# Patient Record
Sex: MALE | Race: WHITE | Employment: PART TIME | URBAN - NONMETROPOLITAN AREA
[De-identification: names, ages, dates, MRNs, and addresses within clinical notes are randomized per-mention and may not be internally consistent; named-entity substitution may affect disease eponyms.]

---

## 2017-05-26 ENCOUNTER — HOSPITAL ENCOUNTER (EMERGENCY)
Age: 52
Discharge: HOME | End: 2017-05-26
Attending: EMERGENCY MEDICINE
Payer: MEDICARE

## 2017-05-26 VITALS
RESPIRATION RATE: 16 BRPM | WEIGHT: 190 LBS | DIASTOLIC BLOOD PRESSURE: 87 MMHG | SYSTOLIC BLOOD PRESSURE: 137 MMHG | BODY MASS INDEX: 25.73 KG/M2 | HEART RATE: 78 BPM | OXYGEN SATURATION: 94 % | HEIGHT: 72 IN | TEMPERATURE: 98.7 F

## 2017-05-26 DIAGNOSIS — K04.7 DENTAL ABSCESS: Primary | ICD-10-CM

## 2017-05-26 DIAGNOSIS — Z79.4 TYPE 2 DIABETES MELLITUS WITH HYPERGLYCEMIA, WITH LONG-TERM CURRENT USE OF INSULIN (HCC): ICD-10-CM

## 2017-05-26 DIAGNOSIS — E11.65 TYPE 2 DIABETES MELLITUS WITH HYPERGLYCEMIA, WITH LONG-TERM CURRENT USE OF INSULIN (HCC): ICD-10-CM

## 2017-05-26 DIAGNOSIS — R73.9 HYPERGLYCEMIA: ICD-10-CM

## 2017-05-26 LAB
GLUCOSE BLD-MCNC: 354 MG/DL
GLUCOSE BLD-MCNC: 354 MG/DL (ref 74–100)

## 2017-05-26 PROCEDURE — 96372 THER/PROPH/DIAG INJ SC/IM: CPT

## 2017-05-26 PROCEDURE — 82947 ASSAY GLUCOSE BLOOD QUANT: CPT

## 2017-05-26 PROCEDURE — 99283 EMERGENCY DEPT VISIT LOW MDM: CPT

## 2017-05-26 PROCEDURE — 6370000000 HC RX 637 (ALT 250 FOR IP): Performed by: EMERGENCY MEDICINE

## 2017-05-26 RX ORDER — HYDROCODONE BITARTRATE AND ACETAMINOPHEN 5; 325 MG/1; MG/1
1 TABLET ORAL ONCE
Status: COMPLETED | OUTPATIENT
Start: 2017-05-26 | End: 2017-05-26

## 2017-05-26 RX ORDER — PENICILLIN V POTASSIUM 250 MG/1
500 TABLET ORAL ONCE
Status: COMPLETED | OUTPATIENT
Start: 2017-05-26 | End: 2017-05-26

## 2017-05-26 RX ORDER — PENICILLIN V POTASSIUM 500 MG/1
500 TABLET ORAL 4 TIMES DAILY
Qty: 40 TABLET | Refills: 0 | Status: SHIPPED | OUTPATIENT
Start: 2017-05-26 | End: 2017-06-02

## 2017-05-26 RX ADMIN — PENICILLIN V POTASIUM 500 MG: 250 TABLET ORAL at 20:35

## 2017-05-26 RX ADMIN — INSULIN LISPRO 10 UNITS: 100 INJECTION, SOLUTION INTRAVENOUS; SUBCUTANEOUS at 21:25

## 2017-05-26 RX ADMIN — HYDROCODONE BITARTRATE AND ACETAMINOPHEN 1 TABLET: 5; 325 TABLET ORAL at 20:35

## 2017-05-26 ASSESSMENT — PAIN SCALES - GENERAL
PAINLEVEL_OUTOF10: 10
PAINLEVEL_OUTOF10: 10

## 2017-05-26 ASSESSMENT — PAIN DESCRIPTION - DESCRIPTORS: DESCRIPTORS: ACHING

## 2017-05-26 ASSESSMENT — PAIN DESCRIPTION - PAIN TYPE: TYPE: ACUTE PAIN

## 2017-05-26 ASSESSMENT — PAIN DESCRIPTION - ORIENTATION: ORIENTATION: LEFT;LOWER

## 2017-05-26 ASSESSMENT — PAIN DESCRIPTION - FREQUENCY: FREQUENCY: CONTINUOUS

## 2017-05-26 ASSESSMENT — PAIN DESCRIPTION - LOCATION: LOCATION: TEETH

## 2017-06-02 ENCOUNTER — HOSPITAL ENCOUNTER (OUTPATIENT)
Age: 52
Discharge: HOME | End: 2017-06-02
Payer: MEDICARE

## 2017-06-02 DIAGNOSIS — Z79.4 TYPE 2 DIABETES MELLITUS WITHOUT COMPLICATION, WITH LONG-TERM CURRENT USE OF INSULIN (HCC): ICD-10-CM

## 2017-06-02 DIAGNOSIS — E11.9 TYPE 2 DIABETES MELLITUS WITHOUT COMPLICATION, WITH LONG-TERM CURRENT USE OF INSULIN (HCC): ICD-10-CM

## 2017-06-02 LAB
ESTIMATED AVERAGE GLUCOSE: 183 MG/DL
HBA1C MFR BLD: 8 % (ref 4.8–5.9)

## 2017-06-02 PROCEDURE — 36415 COLL VENOUS BLD VENIPUNCTURE: CPT

## 2017-06-02 PROCEDURE — 83036 HEMOGLOBIN GLYCOSYLATED A1C: CPT

## 2017-09-25 ENCOUNTER — HOSPITAL ENCOUNTER (OUTPATIENT)
Age: 52
Discharge: HOME | End: 2017-09-25
Payer: MEDICARE

## 2017-09-25 DIAGNOSIS — E11.9 TYPE 2 DIABETES MELLITUS WITHOUT COMPLICATION, WITH LONG-TERM CURRENT USE OF INSULIN (HCC): ICD-10-CM

## 2017-09-25 DIAGNOSIS — Z13.9 SCREENING FOR CONDITION: ICD-10-CM

## 2017-09-25 DIAGNOSIS — Z79.4 TYPE 2 DIABETES MELLITUS WITHOUT COMPLICATION, WITH LONG-TERM CURRENT USE OF INSULIN (HCC): ICD-10-CM

## 2017-09-25 LAB
ESTIMATED AVERAGE GLUCOSE: 174 MG/DL
HBA1C MFR BLD: 7.7 % (ref 4.8–5.9)

## 2017-09-25 PROCEDURE — 86803 HEPATITIS C AB TEST: CPT

## 2017-09-25 PROCEDURE — 83036 HEMOGLOBIN GLYCOSYLATED A1C: CPT

## 2017-09-25 PROCEDURE — 36415 COLL VENOUS BLD VENIPUNCTURE: CPT

## 2017-09-25 PROCEDURE — 87389 HIV-1 AG W/HIV-1&-2 AB AG IA: CPT

## 2017-09-26 ENCOUNTER — CARE COORDINATION (OUTPATIENT)
Dept: CARE COORDINATION | Age: 52
End: 2017-09-26

## 2017-09-26 LAB
HEPATITIS C ANTIBODY: NONREACTIVE
HIV AG/AB: NONREACTIVE

## 2017-10-05 ENCOUNTER — CARE COORDINATION (OUTPATIENT)
Dept: CARE COORDINATION | Age: 52
End: 2017-10-05

## 2017-10-05 NOTE — CARE COORDINATION
Attempted to contact patient today. Left voice message requesting patient call this CC Nurse at [phone].
I agree with care coordinator's plan of care.
per day. Discussed patient's smoking habits and encouraged patient to make a new routine. Advised patien of 800-quit now as resource. Patient verbalizes understanding. Patient reports good support from his parents. Discussed fall prevention/safety. Patient states he fell down the hill when he was climbing over so that he could go down to the bank of the river and fish. Patient reports his abrasions are healing without signs of infection. CC plan: Will follow up in 1-2 weeks for smoking cessation, referral to diabetic ed and blood sugars. Diabetes Assessment    Meal Planning:  None   How often do you test your blood sugar?:  Daily   Do you have barriers with adherence to non-pharmacologic self-management interventions? (Nutrition/Exercise/Self-Monitoring):  No   Have you ever had to go to the ED for symptoms of low blood sugar?:  No      No patient-reported symptoms   Do you have hyperglycemia symptoms?:  No   Do you have hypoglycemia symptoms?:  No   Last Blood Sugar Value:  138   Blood Sugar Monitoring Regimen:  Once a Day   Blood Sugar Trends:  No Change               Care Coordination Interventions    Program Enrollment:  Rising Risk   Referral from Primary Care Provider:  No   Suggested Interventions and Community Resources             Goals Addressed        Patient Stated     Smoking cessation (pt-stated)                  \"I want to quit smoking\"    Barriers: impairment:  cognitive and lack of support  Plan for overcoming my barriers: Patient will continue to wear patch, patient will participate in care coordination  Confidence: 5/10  Anticipated Goal Completion Date: 01/31/2018         Other     Self Monitoring                  Self-Monitored Blood Glucose - I will check my blood sugar Fasting blood sugar    Patient Reported Blood Glucose No flowsheet data found.     Barriers: impairment:  cognitive and lack of education  Plan for overcoming my barriers: Patient will attend

## 2017-10-23 ENCOUNTER — CARE COORDINATION (OUTPATIENT)
Dept: CARE COORDINATION | Age: 52
End: 2017-10-23

## 2017-10-23 NOTE — CARE COORDINATION
Ambulatory Care Coordination Note  11/3/2017  CM Risk Score: 5  Aaliyah Mortality Risk Score:      ACC: Dev Yang RN    Summary Note: Phone call to patient. Patient reports his blood sugars have been higher over the past 2 weeks, usually in the 200's. This nurse discussed reasons why the blood sugars might be elevated and patient states \"I guess I have been eating potato chips and stuff. \"  This nurse asked patient who helps to prepare his meals and he states \"Tyra\" and some other people(paid staff). This nurse asked patient if Duc Khan could accompany him to the diabetic educator so that he could learn more about diabetes and how to take care of himself. Patient states this nurse should call Duc Khan to see when she is available: [phone]. Discussed smoking cessation. Patient states he knows that he should quit smoking, but states \"I just can't do it right now. \"  I will try, but right now I really need them when I get stressed. This nurse provided encouragement and support to patient to quit and described health benefits of quitting. Patient verbalizes understanding. Patient states he went out for a walk today, stating he tries to walk each day because he does not have a car. Provided positve feedback to patient for exercising and discussed the benefits of daily exercise. Left message for Duc Khan at [phone], requesting she contact this nurse. Diabetes Assessment    Meal Planning:  None   How often do you test your blood sugar?:  Daily   Do you have barriers with adherence to non-pharmacologic self-management interventions?  (Nutrition/Exercise/Self-Monitoring):  No   Have you ever had to go to the ED for symptoms of low blood sugar?:  No       Increase or Decrease trend in Blood Sugars   Do you have hyperglycemia symptoms?:  No   Do you have hypoglycemia symptoms?:  No   Last Blood Sugar Value:  145   Blood Sugar Monitoring Regimen:  Once a Day   Blood Sugar Trends:  Steady Increase June Ramirez MD   LEVEMIR FLEXTOUCH 100 UNIT/ML injection pen INJECT 10 UNITS ONCE DAILY 5/31/17   June Ramirez MD   BD PEN NEEDLE ERVIN U/F 32G X 4 MM MISC USE 1 DAILY 4/4/17   June Ramirez MD   NICOTINE STEP 1 21 MG/24HR PLACE 1 PATCH ONTO THE SKIN EVERY 24 HOURS 3/22/17   June Ramirez MD   ONGLYZA 5 MG TABS tablet TAKE 1 TABLET BY MOUTH EVERY DAY WITH OR WITHOUT FOOD 3/13/17   June Ramirez MD   glucose blood VI test strips (ACCU-CHEK SMARTVIEW) strip TEST EVERY DAY dx:E11.9 1/16/17   June Ramirez MD   [address] Extension 5-2.5-18.5 injection  8/17/15   Historical Provider, MD   Multiple Vitamins-Minerals (ICAPS PO) Take 1 tablet by mouth daily. Historical Provider, MD   Lancets MISC 1 each by Does not apply route daily.     Historical Provider, MD       Future Appointments  Date Time Provider Herbert Geiger   2/2/2018 11:00 AM MD Kennedy Rose

## 2017-11-08 ENCOUNTER — CARE COORDINATION (OUTPATIENT)
Dept: CARE COORDINATION | Age: 52
End: 2017-11-08

## 2017-11-08 NOTE — CARE COORDINATION
Ambulatory Care Coordination Note  11/8/2017  CM Risk Score: 5  Aaliyah Mortality Risk Score:      ACC: Mayur Alves RN    Summary Note: Phone call to patient to inform that this nurse has not been able to reach [facility]. Informed patient that this nurse will make the referral for diabetic education and he can schedule it on a day that [facility] can take him. Patient verbalizes understanding. Patient reports his blood sugars are fluctuating. Patient reports he is taking his medications as ordered:  Glimepiride 2 mg bid(breakfast and supper), Levemir 15 u qd, Metforming 850 mg bid and Onglyza 5 mg po qd. Patient states \"It is probably what I am eating. I only have 5 teeth until I get my new false teeth 11/30/2017\"  Patient reports he is eating french fries mostly, because they are easy to chew and swallow. This nurse discussed the high carbohydrate content of french fries, candy, cookies, cake and regular pop and encouraged patient to make different choices such as soup, applesauce, sugar free jello or pudding. Patient verbalizes understanding and states \"I will try that. I know I'm not eating good right now. \"  Patient states his highest blood sugar was in the 200's, but mostly 140-160. Patient states he cannot recall the name of the dentist that he is seeing, but he is located behind Dr. Mariangel Frausto' office. Appointment 11/30/2017. Encouraged patient to decrease amount of cigarettes he is smoking. Patient states he is not ready to quit, but he will try to cut back. CC plan: Will follow up with patient in 1-2 weeks to confirm scheduling diabetic education. .  Diabetes Assessment    Meal Planning:  None   How often do you test your blood sugar?:  Daily   Do you have barriers with adherence to non-pharmacologic self-management interventions?  (Nutrition/Exercise/Self-Monitoring):  No   Have you ever had to go to the ED for symptoms of low blood sugar?:  No       No patient-reported symptoms   Do you Adrian Ortega MD   metFORMIN (GLUCOPHAGE) 850 MG tablet TAKE 1 TABLET BY MOUTH 2 TIMES DAILY 6/5/17   Zahira Helms MD   FLUoxetine (PROZAC) 10 MG capsule Take 1 capsule by mouth daily 6/5/17   Zahira Helms MD   LEVEMIR FLEXTOUCH 100 UNIT/ML injection pen INJECT 10 UNITS ONCE DAILY 5/31/17   Zahira Helms MD   BD PEN NEEDLE ERVIN U/F 32G X 4 MM MISC USE 1 DAILY 4/4/17   Zahira Helms MD   NICOTINE STEP 1 21 MG/24HR PLACE 1 PATCH ONTO THE SKIN EVERY 24 HOURS 3/22/17   Zahira Helms MD   ONGLYZA 5 MG TABS tablet TAKE 1 TABLET BY MOUTH EVERY DAY WITH OR WITHOUT FOOD 3/13/17   Zahira Helms MD   glucose blood VI test strips (ACCU-CHEK SMARTVIEW) strip TEST EVERY DAY dx:E11.9 1/16/17   Zahira Helms MD   [address] Extension 5-2.5-18.5 injection  8/17/15   Historical Provider, MD   Multiple Vitamins-Minerals (ICAPS PO) Take 1 tablet by mouth daily. Historical Provider, MD   Lancets MISC 1 each by Does not apply route daily.     Historical Provider, MD       Future Appointments  Date Time Provider Herbert Geiger   2/2/2018 11:00 AM MD Kennedy Evans

## 2017-11-22 ENCOUNTER — HOSPITAL ENCOUNTER (OUTPATIENT)
Dept: DIABETES SERVICES | Age: 52
Setting detail: THERAPIES SERIES
Discharge: HOME | End: 2017-11-22
Payer: MEDICARE

## 2017-11-22 PROCEDURE — G0108 DIAB MANAGE TRN  PER INDIV: HCPCS | Performed by: COUNSELOR

## 2017-11-22 ASSESSMENT — PATIENT HEALTH QUESTIONNAIRE - PHQ9
5. POOR APPETITE OR OVEREATING: 0
1. LITTLE INTEREST OR PLEASURE IN DOING THINGS: 1
3. TROUBLE FALLING OR STAYING ASLEEP: 0
9. THOUGHTS THAT YOU WOULD BE BETTER OFF DEAD, OR OF HURTING YOURSELF: 0
6. FEELING BAD ABOUT YOURSELF - OR THAT YOU ARE A FAILURE OR HAVE LET YOURSELF OR YOUR FAMILY DOWN: 1
SUM OF ALL RESPONSES TO PHQ9 QUESTIONS 1 & 2: 2
7. TROUBLE CONCENTRATING ON THINGS, SUCH AS READING THE NEWSPAPER OR WATCHING TELEVISION: 0
SUM OF ALL RESPONSES TO PHQ QUESTIONS 1-9: 4
10. IF YOU CHECKED OFF ANY PROBLEMS, HOW DIFFICULT HAVE THESE PROBLEMS MADE IT FOR YOU TO DO YOUR WORK, TAKE CARE OF THINGS AT HOME, OR GET ALONG WITH OTHER PEOPLE: 1
8. MOVING OR SPEAKING SO SLOWLY THAT OTHER PEOPLE COULD HAVE NOTICED. OR THE OPPOSITE, BEING SO FIGETY OR RESTLESS THAT YOU HAVE BEEN MOVING AROUND A LOT MORE THAN USUAL: 0
2. FEELING DOWN, DEPRESSED OR HOPELESS: 1
4. FEELING TIRED OR HAVING LITTLE ENERGY: 1

## 2017-11-22 NOTE — PROGRESS NOTES
Diabetes Self-Management Education Record    Progress Note:    Participant Name: Elias Craig  Referring Provider:  Tamiko Craig MD    Keys to learning:  Considerations: [x]Language []Emotional []Health Literacy  []Cognitive []Memory changes []Financial []Cultural   []Pentecostal []Vision []Hearing  []Speech []Lack of desire  []Literacy  []Psycho-social  []None  If considerations are noted, accommodations made:     Identified barriers to learning/self management:     The following information was discussed:    [x] Diabetes disease process and treatment options   [x] Healthy nutrition, carbohydrate counting, meal planning  [x] Monitoring blood glucose and other parameters; interpreting and using results  [] Acute complications--prevention, detection and treatment  [x] Medication management and safety Instructed by: [] Pharmacist [] nurse  [] Incorporating physical activity into lifestyle Instructed by:[] Exercise physiologist [] nurse  [] Exercise for Health, Reducing Risks for Heart Disease, Diabetes and Heart Health  [] Preventing, through risk reduction behaviors, detecting, and treating chronic complications  [] Sick Day management  [] Developing personalized strategies to address psychosocial issues and concerns  [] Developing personalized strategies to promote health and behavior change through goalsetting, behavior change strategies aimed at risk reduction  [] Special situations--disaster planning, travel, social activities    Session Assessment & Evaluation Ratings:  1=Needs Instruction  2=Needs Review  3=Comprehends Key Points  4=Demonstrates Understanding/Competency  NC=Not Covered   N/A=Not Applicable Initial  Assess    Date:   2nd  Visit     Date:   3rd  Visit    Date: 4th  Visit    Date: Comments  S.O.C=Stage of Change/Readiness to change:  · Pre=Pre-contemplation stage--not thinking about changing  · C=Contemplation stageambivalent about changing  · P=Preparation stage--prepared to made a specific

## 2017-11-29 ENCOUNTER — CARE COORDINATION (OUTPATIENT)
Dept: CARE COORDINATION | Age: 52
End: 2017-11-29

## 2017-12-14 ENCOUNTER — CARE COORDINATION (OUTPATIENT)
Dept: CARE COORDINATION | Age: 52
End: 2017-12-14

## 2017-12-14 NOTE — CARE COORDINATION
Ambulatory Care Coordination Note  12/14/2017  CM Risk Score: 1  Aaliyah Mortality Risk Score:      ACC: Cristopher Enriquez RN    Summary Note: Phone call to patient. Patient states he did not learn anything new at diabetic education, but he was able to review his goals. Discussed low carbohydrate versus high carbohydrate foods with patient. Patient verbalizes understanding and states \"I try to eat smaller portions. \"  Reminded patient to avoid sweets(candy, cookies, fruit juice and cakes). Patient verbalizes understanding. Reminded patient of appointment with Dietician 12/22/2017 1:00 pm.  Patient states he is caregiver from the [employer] will transport him and stay for the dietician appointment. Patient states his caregiver takes him shopping, helps him pay bills and prepare meals. No homecare new home care needs identified at this time. Patient states he is able to afford all of his medications and he states he is taking them as ordered. Patient continues to smoke cigarettes and reports he is not ready to quit right now. CC plan: Will follow up with patient next week to remind him of appt with dietician. Diabetes Assessment    Meal Planning:  None   How often do you test your blood sugar?:  Daily   Do you have barriers with adherence to non-pharmacologic self-management interventions? (Nutrition/Exercise/Self-Monitoring):  No   Have you ever had to go to the ED for symptoms of low blood sugar?:  No       No patient-reported symptoms   Do you have hyperglycemia symptoms?:  No   Do you have hypoglycemia symptoms?:  No   Last Blood Sugar Value:  145   Blood Sugar Monitoring Regimen:  Morning Fasting   Blood Sugar Trends:  No Change          Care Coordination Interventions    Program Enrollment:  Rising Risk  Referral from Primary Care Provider:  No  Suggested Interventions and Community Resources  Diabetes Education:   In Process  Fall Risk Prevention:  Completed  Life Skills Coaching: Completed (Comment: Has caregiver through [employer] to help shop and pay bills 2 times a week)  Registered Dietician:  Completed  Smoking Cessation:  Completed  Zone Management Tools:  Completed         Goals Addressed             Most Recent       Patient Stated     Smoking cessation (pt-stated)   No change (12/14/2017)             \"I want to quit smoking\"    Barriers: impairment:  cognitive and lack of support  Plan for overcoming my barriers: Patient will continue to wear patch, patient will participate in care coordination  Confidence: 5/10  Anticipated Goal Completion Date: 01/31/2018         Other     Self Monitoring   On track (12/14/2017)             Self-Monitored Blood Glucose - I will check my blood sugar Fasting blood sugar    Patient Reported Blood Glucose No flowsheet data found. Barriers: impairment:  cognitive and lack of education  Plan for overcoming my barriers: Patient will attend regular appt with PCP, patient will participate in care coordination. Confidence: 7/10  Anticipated Goal Completion Date: 01/31/2018              Prior to Admission medications    Medication Sig Start Date End Date Taking?  Authorizing Provider   FLUoxetine (PROZAC) 10 MG capsule TAKE 1 CAPSULE BY MOUTH DAILY 11/27/17   Zahira Helms MD   metFORMIN (GLUCOPHAGE) 850 MG tablet TAKE 1 TABLET TWICE A DAY 11/10/17   Zahira Helms MD   glimepiride (AMARYL) 2 MG tablet TAKE 2 TABLETS BY MOUTH TWICE DAILY BEFORE MEALS 7/26/17   Zahira Helms MD   omeprazole (PRILOSEC) 20 MG delayed release capsule TAKE 1 CAPSULE BY MOUTH DAILY 6/5/17   Zahira Helms MD   atorvastatin (LIPITOR) 80 MG tablet TAKE 1 TABLET BY MOUTH EVERY DAY 6/5/17   Zahira Helms MD   LEVEMIR FLEXTOUCH 100 UNIT/ML injection pen INJECT 10 UNITS ONCE DAILY 5/31/17   Zahira Helms MD   BD PEN NEEDLE ERVIN U/F 32G X 4 MM MISC USE 1 DAILY 4/4/17   Zahira Helms MD   NICOTINE STEP 1 21 MG/24HR PLACE 1 PATCH ONTO THE SKIN EVERY 24 HOURS 3/22/17   Declan Linares MD   ONGLYZA 5 MG TABS tablet TAKE 1 TABLET BY MOUTH EVERY DAY WITH OR WITHOUT FOOD 3/13/17   Declan Linares MD   glucose blood VI test strips (ACCU-CHEK SMARTVIEW) strip TEST EVERY DAY dx:E11.9 1/16/17   Declan Linares MD   [address] 5-2.5-18.5 injection  8/17/15   Historical Provider, MD   Multiple Vitamins-Minerals (ICAPS PO) Take 1 tablet by mouth daily. Historical Provider, MD   Lancets MISC 1 each by Does not apply route daily.     Historical Provider, MD       Future Appointments  Date Time Provider Herbert Geiger   12/22/2017 1:00 PM Luiz Richmond, RD, LD [facility] AT THE [facility] DIET None   1/12/2018 1:00 PM [facility] DIABETES EDUCATION ROOM [facility] Diab Ed [place]   2/2/2018 11:00 AM Declan Linares MD [facility] Hermann Rubio

## 2017-12-21 ENCOUNTER — CARE COORDINATION (OUTPATIENT)
Dept: CARE COORDINATION | Age: 52
End: 2017-12-21

## 2017-12-21 NOTE — CARE COORDINATION
Ambulatory Care Coordination Note  12/21/2017  CM Risk Score: 1  Aaliyah Mortality Risk Score:      ACC: Martinez Sanchez RN    Summary Note: Phone call to patient to remind of appt with Dietician 12/22/2017 1:00 pm.  Patient confirmed that he is going and states his caregiver is going to go along and listen. Patient reports his blood sugars are usually 120-140 in the morning, but yesterday it was 200. This nurse asked patient about his meals the day prior and he noted he had french fries and a fish sandwich for a late supper. This nurse discussed the carb content for that meal and how that effects blood sugars. Encouraged patient to eat low carb foods and avoid sweets. Patient states he is still smoking and states \"I'm just not ready to quit. \"    Patient is in good spirits and states he will spend the holidays with his family in [place], [place]. CC plan: Will follow up with patient after the holidays to review dietician plan. Diabetes Assessment    Meal Planning:  None   How often do you test your blood sugar?:  Daily   Do you have barriers with adherence to non-pharmacologic self-management interventions?  (Nutrition/Exercise/Self-Monitoring):  No   Have you ever had to go to the ED for symptoms of low blood sugar?:  No       No patient-reported symptoms   Do you have hyperglycemia symptoms?:  No   Do you have hypoglycemia symptoms?:  No   Last Blood Sugar Value:  124   Blood Sugar Monitoring Regimen:  Once a Day   Blood Sugar Trends:  No Change                Care Coordination Interventions    Program Enrollment:  Rising Risk  Referral from Primary Care Provider:  No  Suggested Interventions and Community Resources  Diabetes Education:  Completed  Fall Risk Prevention:  Completed  Life Skills Coaching:  Completed (Comment: Has caregiver through [employer] to help shop and pay bills 2 times a week)  Registered Dietician:  Completed  Smoking Cessation:  Completed  Zone Management Tools: Completed         Goals Addressed             Most Recent       Patient Stated     Smoking cessation (pt-stated)   No change (12/21/2017)             \"I want to quit smoking\"    Barriers: impairment:  cognitive and lack of support  Plan for overcoming my barriers: Patient will continue to wear patch, patient will participate in care coordination  Confidence: 5/10  Anticipated Goal Completion Date: 01/31/2018         Other     Self Monitoring   On track (12/21/2017)             Self-Monitored Blood Glucose - I will check my blood sugar Fasting blood sugar    Patient Reported Blood Glucose No flowsheet data found. Barriers: impairment:  cognitive and lack of education  Plan for overcoming my barriers: Patient will attend regular appt with PCP, patient will participate in care coordination. Confidence: 7/10  Anticipated Goal Completion Date: 01/31/2018              Prior to Admission medications    Medication Sig Start Date End Date Taking?  Authorizing Provider   FLUoxetine (PROZAC) 10 MG capsule TAKE 1 CAPSULE BY MOUTH DAILY 11/27/17   Marisela Wellington MD   metFORMIN (GLUCOPHAGE) 850 MG tablet TAKE 1 TABLET TWICE A DAY 11/10/17   Marisela Wellington MD   glimepiride (AMARYL) 2 MG tablet TAKE 2 TABLETS BY MOUTH TWICE DAILY BEFORE MEALS 7/26/17   Marisela Wellington MD   omeprazole (PRILOSEC) 20 MG delayed release capsule TAKE 1 CAPSULE BY MOUTH DAILY 6/5/17   Marisela Wellington MD   atorvastatin (LIPITOR) 80 MG tablet TAKE 1 TABLET BY MOUTH EVERY DAY 6/5/17   Marisela Wellington MD   LEVEMIR FLEXTOUCH 100 UNIT/ML injection pen INJECT 10 UNITS ONCE DAILY 5/31/17   Marisela Wellington MD   BD PEN NEEDLE ERVIN U/F 32G X 4 MM MISC USE 1 DAILY 4/4/17   Marisela Wellington MD   NICOTINE STEP 1 21 MG/24HR PLACE 1 PATCH ONTO THE SKIN EVERY 24 HOURS 3/22/17   Marisela Wellington MD   ONGLYZA 5 MG TABS tablet TAKE 1 TABLET BY MOUTH EVERY DAY WITH OR WITHOUT FOOD 3/13/17   Marisela Wellington MD   glucose blood VI test strips (ACCU-CHEK SMARTVIEW) strip TEST EVERY DAY dx:E11.9 1/16/17   Tyree Sousa MD   [address] 5-2.5-18.5 injection  8/17/15   Historical Provider, MD   Multiple Vitamins-Minerals (ICAPS PO) Take 1 tablet by mouth daily. Historical Provider, MD   Lancets MISC 1 each by Does not apply route daily.     Historical Provider, MD       Future Appointments  Date Time Provider Herbert Fely   12/22/2017 1:00 PM Brian Thomas RD, LD [facility] DIET None   1/12/2018 1:00 PM [facility] DIABETES EDUCATION ROOM [facility] Diab Ed [place]   2/2/2018 11:00 AM Tyree Sousa MD [address] [place] [address] Elsy

## 2017-12-22 ENCOUNTER — HOSPITAL ENCOUNTER (OUTPATIENT)
Dept: NUTRITION | Age: 52
Discharge: HOME | End: 2017-12-22
Payer: MEDICARE

## 2017-12-22 VITALS — HEIGHT: 72 IN | WEIGHT: 202.8 LBS | BODY MASS INDEX: 27.47 KG/M2

## 2017-12-22 PROCEDURE — 97802 MEDICAL NUTRITION INDIV IN: CPT

## 2018-01-12 ENCOUNTER — HOSPITAL ENCOUNTER (OUTPATIENT)
Dept: DIABETES SERVICES | Age: 53
Setting detail: THERAPIES SERIES
Discharge: HOME | End: 2018-01-12
Payer: MEDICARE

## 2018-01-12 PROCEDURE — G0108 DIAB MANAGE TRN  PER INDIV: HCPCS | Performed by: COUNSELOR

## 2018-01-12 NOTE — PROGRESS NOTES
stage--prepared to made a specific change  · A=Action stage--committed to modify behaviors  · M=Maintenance and relapse prevention--incorporating new behavior    Participant Stated  Goal    To eat three meals per day using plate method                                   Participant Stated Goal   To walk everyday for 30 minutes.       S. O.C  [] PRE  [] C  [x] P      [] A  [] M                             S. O.C  [] PRE  [] C  [x] P      [] A  [] M       S. O.C  [] PRE  [] C  [] P      [x] A  [] M                              S. O.C  [] PRE  [] C  [] P      [x] A  [] M        S. O.C  [] PRE  [] C  [] P      [] A  [] M                             S. O.C  [] PRE  [] C  [] P      [] A  [] M       S. O.C  [] PRE  [] C  [] P      [] A  [] M                             S. O.C  [] PRE  [] C  [] P      [] A  [] M    Current main goal attainment frequency:   [] Never  [] Some  [] Half  [] Most  [] All     Participant confidence to master goal this visit:   [] Excellent  [] Good  [] Fair  [] Poor                 Current main goal attainment frequency:   [] Never  [] Some  [] Half  [] Most  [] All     Participant confidence to master goal this visit:   [] Excellent  [] Good [] Fair  [] Poor                      Session  Topics & Learning Objectives:          Comments:    Diabetes disease process & Treatment process: Define diabetes & identify own type of diabetes; Identify options for treating diabetes                       Rating  [] 1  [] 2  [x] 3  [] 4      [] NC  [] N/A Rating  [] 1  [] 2  [x] 3  [] 4  [] NC  [] N/A    Rating  [] 1  [] 2  [] 3  [] 4  [] NC  [] N/A    Rating  [] 1  [] 2  [] 3  [] 4  [] NC  [] N/A         Incorporating nutritional management into lifestyle: Describe effect of type, amount & timing of food on blood glucose; Describe basic meal planning techniques & current nutrition guidelines; Correctly read food labels & demonstrate CHO counting & portion control with personalized meal plan.   Rating  [] 1  [] 2  [x] 3  [] 4  [] NC  [] N/A    Rating  [] 1  [] 2  [x] 3  [] 4  [] NC  [] N/A    Rating  [] 1  [] 2  [] 3  [] 4  [] NC  [] N/A    Rating  [] 1  [] 2  [] 3  [] 4  [] NC  [] N/A                  Incorporating physical activity into lifestyle:   State effect of exercise on blood glucose levels. Identifies personal exercise plan. Discussed safety tips while exercising.              Rating  [] 1  [] 2  [] 3  [x] 4  [] NC  [] N/A    Rating  [] 1  [] 2  [] 3  [x] 4  [] NC  [] N/A       Rating  [] 1  [] 2  [] 3  [] 4  [] NC  [] N/A    Rating  [] 1  [] 2  [] 3  [] 4  [] NC  [] N/A            Using medications safely: State effect of diabetes medicines on diabetes;   Name diabetes medication taking, action, timing & side effects    Rating  [] 1  [] 2  [x] 3  [] 4  [] NC  [] N/A       Rating  [] 1  [] 2  [x] 3  [] 4  [] NC  [] N/A       Rating  [] 1  [] 2  [] 3  [] 4  [] NC  [] N/A       Rating  [] 1  [] 2  [] 3  [] 4  [] NC  [] N/A            Monitoring blood glucose, interpreting and using results: Identify recommended blood glucose targets, personal targets, appropriate techniques and problem solving. Rating  [] 1  [] 2  [] 3  [] 4  [x] NC  [] N/A    Rating  [] 1  [] 2  [] 3  [x] 4  [] NC  [] N/A       Rating  [] 1  [] 2  [] 3  [] 4  [] NC  [] N/A    Rating  [] 1  [] 2  [] 3  [] 4  [] NC  [] N/A         Prevention, detection & treatment of acute complications: List symptoms of hyper- and hypoglycemia;    Describe how to treat low blood sugar & actions for lowering high blood glucose levels  Rating  [] 1  [] 2  [] 3  [] 4  [x] NC  [] N/A    Rating  [] 1  [] 2  [] 3  [x] 4  [] NC  [] N/A    Rating  [] 1  [] 2  [] 3  [] 4  [] NC  [] N/A    Rating  [] 1  [] 2  [] 3  [] 4  [] NC  [] N/A         Prevention, detection & treatment of chronic complications: Define the natural course of diabetes & describe the relationship of blood glucose levels to long term complications of diabetes                   Rating  [] 1  [] 2  [] 3  [] 4  [x] NC  [] N/A    Rating  [] 1  [] 2  [] 3  [] 4  [] NC  [] N/A    Rating  [] 1  [] 2  [] 3  [] 4  [] NC  [] N/A    Rating  [] 1  [] 2  [] 3  [] 4  [] NC  [] N/A      Developing strategies to address psychosocial issues: Describe feelings about living with diabetes; Identify support needed & support network. Rating  [] 1  [] 2  [] 3  [] 4  [x] NC  [] N/A       Rating  [] 1  [] 2  [] 3  [x] 4  [] NC  [] N/A    Rating  [] 1  [] 2  [] 3  [] 4  [] NC  [] N/A    Rating  [] 1  [] 2  [] 3  [] 4  [] NC  [] N/A          Developing strategies to promote health/change behavior:  Define the ABCs of diabetes;  Identify appropriate screenings, schedule & personal plan for screenings.       Rating  [] 1  [] 2  [] 3  [] 4  [x] N  [] N/A    Rating  [] 1  [] 2  [] 3  [x] 4  [] NC  [] N/A    Rating  [] 1  [] 2  [] 3  [] 4  [] NC  [] N/A    Rating  [] 1  [] 2  [] 3  [] 4  [] NC  [] N/A               Handouts/Booklets given:      [x] Living Well with Diabetes    [x] Daily Diabetic Meal Planning Guide   [] Nutrition in the [place] [address]    [] Resources for People With Diabetes   [] Other                                       DIABETIC EDUCATION OP CONSULT on 11/22/2017            Routing History            Detailed Report        Progress Notes Info     Author Note Status Last Update User   Dom Eid RN Signed Dom Eid RN   Last Update Date/Time: 11/22/2017  1:59 PM   Chart Review Routing History     Recipient Method Report Sent By   Anders Triana MD   Fax: [phone]   Phone: [phone]    Fax Notes Report Dom Eid RN [[id number]]   Sent: 11/22/2017  2:09 PM   Filed: 11/22/2017     Diabetes Self-Management Education Record     Progress Note:     Participant Name: Augusta Mancera  Referring Provider:  Anders Triana MD     Keys to learning:  Considerations: [x]Language []Emotional []Health Literacy  []Cognitive []Memory changes []Financial []Cultural   []Druze []Vision []Hearing  []Speech []Lack of

## 2018-01-29 ENCOUNTER — HOSPITAL ENCOUNTER (OUTPATIENT)
Age: 53
Discharge: HOME | End: 2018-01-29
Payer: MEDICARE

## 2018-01-29 DIAGNOSIS — Z79.4 TYPE 2 DIABETES MELLITUS WITHOUT COMPLICATION, WITH LONG-TERM CURRENT USE OF INSULIN (HCC): ICD-10-CM

## 2018-01-29 DIAGNOSIS — E11.9 TYPE 2 DIABETES MELLITUS WITHOUT COMPLICATION, WITH LONG-TERM CURRENT USE OF INSULIN (HCC): ICD-10-CM

## 2018-01-29 LAB
CHOLESTEROL, FASTING: 98 MG/DL
CHOLESTEROL/HDL RATIO: 3.3
CREATININE URINE: 121.6 MG/DL (ref 39–259)
ESTIMATED AVERAGE GLUCOSE: 189 MG/DL
HBA1C MFR BLD: 8.2 % (ref 4.8–5.9)
HDLC SERPL-MCNC: 30 MG/DL
LDL CHOLESTEROL: 51 MG/DL (ref 0–130)
MICROALBUMIN/CREAT 24H UR: 12 MG/L
MICROALBUMIN/CREAT UR-RTO: 10 MCG/MG CREAT
TRIGLYCERIDE, FASTING: 84 MG/DL
VLDLC SERPL CALC-MCNC: ABNORMAL MG/DL (ref 1–30)

## 2018-01-29 PROCEDURE — 36415 COLL VENOUS BLD VENIPUNCTURE: CPT

## 2018-01-29 PROCEDURE — 82043 UR ALBUMIN QUANTITATIVE: CPT

## 2018-01-29 PROCEDURE — 82570 ASSAY OF URINE CREATININE: CPT

## 2018-01-29 PROCEDURE — 83036 HEMOGLOBIN GLYCOSYLATED A1C: CPT

## 2018-01-29 PROCEDURE — 80061 LIPID PANEL: CPT

## 2018-01-30 ENCOUNTER — CARE COORDINATION (OUTPATIENT)
Dept: CARE COORDINATION | Age: 53
End: 2018-01-30

## 2018-01-31 NOTE — CARE COORDINATION
Isi Melendrez MD   LEVEMIR FLEXTOUCH 100 UNIT/ML injection pen INJECT 10 UNITS ONCE DAILY 5/31/17  Yes Mitali Houser MD   NICOTINE STEP 1 21 MG/24HR PLACE 1 PATCH ONTO THE SKIN EVERY 24 HOURS 3/22/17  Yes Mitali Houser MD   ONGLYZA 5 MG TABS tablet TAKE 1 TABLET BY MOUTH EVERY DAY WITH OR WITHOUT FOOD 3/13/17  Yes Mitali Houser MD   Multiple Vitamins-Minerals (ICAPS PO) Take 1 tablet by mouth daily. Yes Historical Provider, MD   BD PEN NEEDLE ERVIN U/F 32G X 4 MM MISC USE 1 DAILY 4/4/17   Mitali Houser MD   glucose blood VI test strips (ACCU-CHEK SMARTVIEW) strip TEST EVERY DAY dx:E11.9 1/16/17   Mitali Houser MD   [address] 5-2.5-18.5 injection  8/17/15   Historical Provider, MD   Lancets MISC 1 each by Does not apply route daily.     Historical Provider, MD       Future Appointments  Date Time Provider Herbert Geiger   2/5/2018 1:10 PM Mitali Houser MD [address] [place] [address]

## 2018-03-14 ENCOUNTER — CARE COORDINATION (OUTPATIENT)
Dept: CARE COORDINATION | Age: 53
End: 2018-03-14

## 2018-03-23 ENCOUNTER — CARE COORDINATION (OUTPATIENT)
Dept: CARE COORDINATION | Age: 53
End: 2018-03-23

## 2018-04-04 ENCOUNTER — CARE COORDINATION (OUTPATIENT)
Dept: CARE COORDINATION | Age: 53
End: 2018-04-04

## 2018-05-07 ENCOUNTER — CARE COORDINATION (OUTPATIENT)
Dept: CARE COORDINATION | Age: 53
End: 2018-05-07

## 2018-05-31 ENCOUNTER — HOSPITAL ENCOUNTER (OUTPATIENT)
Age: 53
Discharge: HOME | End: 2018-05-31
Payer: MEDICARE

## 2018-05-31 DIAGNOSIS — Z79.4 TYPE 2 DIABETES MELLITUS WITHOUT COMPLICATION, WITH LONG-TERM CURRENT USE OF INSULIN (HCC): ICD-10-CM

## 2018-05-31 DIAGNOSIS — E11.9 TYPE 2 DIABETES MELLITUS WITHOUT COMPLICATION, WITH LONG-TERM CURRENT USE OF INSULIN (HCC): ICD-10-CM

## 2018-05-31 LAB
ESTIMATED AVERAGE GLUCOSE: 212 MG/DL
HBA1C MFR BLD: 9 % (ref 4.8–5.9)

## 2018-05-31 PROCEDURE — 83036 HEMOGLOBIN GLYCOSYLATED A1C: CPT

## 2018-05-31 PROCEDURE — 36415 COLL VENOUS BLD VENIPUNCTURE: CPT

## 2018-06-13 ENCOUNTER — CARE COORDINATION (OUTPATIENT)
Dept: CARE COORDINATION | Age: 53
End: 2018-06-13

## 2018-07-27 ENCOUNTER — CARE COORDINATION (OUTPATIENT)
Dept: CARE COORDINATION | Age: 53
End: 2018-07-27

## 2018-09-17 ENCOUNTER — CARE COORDINATION (OUTPATIENT)
Dept: CARE COORDINATION | Age: 53
End: 2018-09-17

## 2018-09-17 NOTE — CARE COORDINATION
Attempt to contact patient today regarding care coordination, unable to reach. Recent encounters and notes reviewed. Future Appointments  Date Time Provider Herbert Geiger   11/6/2018 11:00 AM MD Shannan Newsome     How is diarrhea?     Wil López RN Care Coordinator  [phone]

## 2018-10-08 ENCOUNTER — HOSPITAL ENCOUNTER (OUTPATIENT)
Age: 53
Discharge: HOME | End: 2018-10-08
Payer: MEDICARE

## 2018-10-08 DIAGNOSIS — Z12.5 SCREENING FOR PROSTATE CANCER: ICD-10-CM

## 2018-10-08 LAB — PROSTATE SPECIFIC ANTIGEN: 1.5 UG/L

## 2018-10-08 PROCEDURE — G0103 PSA SCREENING: HCPCS

## 2018-10-08 PROCEDURE — 36415 COLL VENOUS BLD VENIPUNCTURE: CPT

## 2018-10-10 ENCOUNTER — HOSPITAL ENCOUNTER (OUTPATIENT)
Dept: CT IMAGING | Age: 53
Discharge: HOME | End: 2018-10-12
Payer: MEDICARE

## 2018-10-10 DIAGNOSIS — N20.0 LEFT NEPHROLITHIASIS: ICD-10-CM

## 2018-10-10 PROCEDURE — 74176 CT ABD & PELVIS W/O CONTRAST: CPT

## 2018-10-29 ENCOUNTER — CARE COORDINATION (OUTPATIENT)
Dept: CARE COORDINATION | Age: 53
End: 2018-10-29

## 2018-10-29 NOTE — CARE COORDINATION
Attempt to contact patient today regarding care coordination, unable to reach. Pt in office recently, CT completed, no renal calculi, enlarged prostate. Also attempted to reach out to [employer], no answer. Noted reviewed from [facility], he did go to diabetic education, appears he has been previously engaged with CC. Will continue to reach out. Lab Results   Component Value Date    LABA1C 9.0 (H) 05/31/2018    LABA1C 8.2 (H) 01/29/2018    LABA1C 7.7 (H) 09/25/2017     Lab Results   Component Value Date    LABMICR 10 01/29/2018    CREATININE 0.62 (L) 01/03/2017         Recent encounters and notes reviewed.      Future Appointments  Date Time Provider Herbert Geiger   11/6/2018 11:00 AM Simón Landa MD [facility] [address] RN Care Coordinator  [phone]

## 2018-11-01 ENCOUNTER — HOSPITAL ENCOUNTER (OUTPATIENT)
Age: 53
Discharge: HOME | End: 2018-11-01
Payer: MEDICARE

## 2018-11-01 DIAGNOSIS — Z79.4 TYPE 2 DIABETES MELLITUS WITHOUT COMPLICATION, WITH LONG-TERM CURRENT USE OF INSULIN (HCC): ICD-10-CM

## 2018-11-01 DIAGNOSIS — E11.9 TYPE 2 DIABETES MELLITUS WITHOUT COMPLICATION, WITH LONG-TERM CURRENT USE OF INSULIN (HCC): ICD-10-CM

## 2018-11-01 LAB
ESTIMATED AVERAGE GLUCOSE: 212 MG/DL
HBA1C MFR BLD: 9 % (ref 4.8–5.9)

## 2018-11-01 PROCEDURE — 36415 COLL VENOUS BLD VENIPUNCTURE: CPT

## 2018-11-01 PROCEDURE — 83036 HEMOGLOBIN GLYCOSYLATED A1C: CPT

## 2018-12-05 ENCOUNTER — CARE COORDINATION (OUTPATIENT)
Dept: CARE COORDINATION | Age: 53
End: 2018-12-05

## 2018-12-05 NOTE — CARE COORDINATION
Attempt to contact patient today regarding care coordination, unable to reach. LVM    Pt in office recently, ADDED Bydureon to medication regimen. Blood sugars were called in and reported. Pt is a risking risk 1, will discharge at this time due to unable to reach. Pt has been to DM education and in contact with her. Also attempted to reach out to [employer] Care[employer]iver, no answer. Noted reviewed from [facility], he did go to diabetic education, appears he has been previously engaged with CC. Will continue to reach out. Lab Results   Component Value Date    LABA1C 9.0 (H) 11/01/2018    LABA1C 9.0 (H) 05/31/2018    LABA1C 8.2 (H) 01/29/2018     Lab Results   Component Value Date    LABMICR 10 01/29/2018    CREATININE 0.62 (L) 01/03/2017         Recent encounters and notes reviewed.      Future Appointments  Date Time Provider Herbert Geiger   3/4/2019 10:30 AM MD Shannan Santos [address] RN Care Coordinator  [phone]

## 2019-05-30 ENCOUNTER — HOSPITAL ENCOUNTER (EMERGENCY)
Age: 54
Discharge: HOME | End: 2019-05-30
Attending: EMERGENCY MEDICINE
Payer: MEDICARE

## 2019-05-30 ENCOUNTER — APPOINTMENT (OUTPATIENT)
Dept: GENERAL RADIOLOGY | Age: 54
End: 2019-05-30
Payer: MEDICARE

## 2019-05-30 VITALS
BODY MASS INDEX: 24.38 KG/M2 | SYSTOLIC BLOOD PRESSURE: 145 MMHG | HEART RATE: 70 BPM | DIASTOLIC BLOOD PRESSURE: 94 MMHG | TEMPERATURE: 97.4 F | RESPIRATION RATE: 16 BRPM | WEIGHT: 190 LBS | OXYGEN SATURATION: 99 % | HEIGHT: 74 IN

## 2019-05-30 DIAGNOSIS — R73.9 HYPERGLYCEMIA: ICD-10-CM

## 2019-05-30 DIAGNOSIS — R41.0 CONFUSION: Primary | ICD-10-CM

## 2019-05-30 LAB
-: NORMAL
ABSOLUTE EOS #: 0.1 K/UL (ref 0–0.44)
ABSOLUTE IMMATURE GRANULOCYTE: 0.05 K/UL (ref 0–0.3)
ABSOLUTE LYMPH #: 1.38 K/UL (ref 1.1–3.7)
ABSOLUTE MONO #: 0.65 K/UL (ref 0.1–1.2)
ALBUMIN SERPL-MCNC: 4 G/DL (ref 3.5–5.2)
ALBUMIN/GLOBULIN RATIO: 1.3 (ref 1–2.5)
ALP BLD-CCNC: 82 U/L (ref 40–129)
ALT SERPL-CCNC: 16 U/L (ref 5–41)
AMORPHOUS: NORMAL
ANION GAP SERPL CALCULATED.3IONS-SCNC: 10 MMOL/L (ref 9–17)
AST SERPL-CCNC: 13 U/L
BACTERIA: NORMAL
BASOPHILS # BLD: 1 % (ref 0–2)
BASOPHILS ABSOLUTE: 0.08 K/UL (ref 0–0.2)
BETA-HYDROXYBUTYRATE: 0.08 MMOL/L (ref 0.02–0.27)
BILIRUB SERPL-MCNC: 0.71 MG/DL (ref 0.3–1.2)
BILIRUBIN URINE: NEGATIVE
BUN BLDV-MCNC: 17 MG/DL (ref 6–20)
BUN/CREAT BLD: 23 (ref 9–20)
CALCIUM SERPL-MCNC: 9.3 MG/DL (ref 8.6–10.4)
CASTS UA: NORMAL /LPF
CHLORIDE BLD-SCNC: 100 MMOL/L (ref 98–107)
CO2: 26 MMOL/L (ref 20–31)
COLOR: YELLOW
COMMENT UA: ABNORMAL
CREAT SERPL-MCNC: 0.74 MG/DL (ref 0.7–1.2)
CRYSTALS, UA: NORMAL /HPF
DIFFERENTIAL TYPE: ABNORMAL
EOSINOPHILS RELATIVE PERCENT: 1 % (ref 1–4)
EPITHELIAL CELLS UA: NORMAL /HPF (ref 0–5)
GFR AFRICAN AMERICAN: >60 ML/MIN
GFR NON-AFRICAN AMERICAN: >60 ML/MIN
GFR SERPL CREATININE-BSD FRML MDRD: ABNORMAL ML/MIN/{1.73_M2}
GFR SERPL CREATININE-BSD FRML MDRD: ABNORMAL ML/MIN/{1.73_M2}
GLUCOSE BLD-MCNC: 201 MG/DL (ref 70–99)
GLUCOSE URINE: NEGATIVE
HCT VFR BLD CALC: 47.2 % (ref 40.7–50.3)
HEMOGLOBIN: 15.8 G/DL (ref 13–17)
IMMATURE GRANULOCYTES: 1 %
KETONES, URINE: NEGATIVE
LACTIC ACID, WHOLE BLOOD: NORMAL MMOL/L (ref 0.7–2.1)
LACTIC ACID: 1.4 MMOL/L (ref 0.5–2.2)
LEUKOCYTE ESTERASE, URINE: NEGATIVE
LYMPHOCYTES # BLD: 14 % (ref 24–43)
MCH RBC QN AUTO: 33 PG (ref 25.2–33.5)
MCHC RBC AUTO-ENTMCNC: 33.5 G/DL (ref 28.4–34.8)
MCV RBC AUTO: 98.5 FL (ref 82.6–102.9)
MONOCYTES # BLD: 7 % (ref 3–12)
MUCUS: NORMAL
NITRITE, URINE: NEGATIVE
NRBC AUTOMATED: 0 PER 100 WBC
OTHER OBSERVATIONS UA: NORMAL
PDW BLD-RTO: 13.2 % (ref 11.8–14.4)
PH UA: 6.5 (ref 5–9)
PLATELET # BLD: 179 K/UL (ref 138–453)
PLATELET ESTIMATE: ABNORMAL
PMV BLD AUTO: 11.3 FL (ref 8.1–13.5)
POTASSIUM SERPL-SCNC: 4.3 MMOL/L (ref 3.7–5.3)
PROTEIN UA: NEGATIVE
RBC # BLD: 4.79 M/UL (ref 4.21–5.77)
RBC # BLD: ABNORMAL 10*6/UL
RBC UA: NORMAL /HPF (ref 0–2)
RENAL EPITHELIAL, UA: NORMAL /HPF
SEG NEUTROPHILS: 76 % (ref 36–65)
SEGMENTED NEUTROPHILS ABSOLUTE COUNT: 7.8 K/UL (ref 1.5–8.1)
SODIUM BLD-SCNC: 136 MMOL/L (ref 135–144)
SPECIFIC GRAVITY UA: <1.005 (ref 1.01–1.02)
TOTAL PROTEIN: 7 G/DL (ref 6.4–8.3)
TRICHOMONAS: NORMAL
TROPONIN INTERP: NORMAL
TROPONIN T: <0.03 NG/ML
TROPONIN, HIGH SENSITIVITY: NORMAL NG/L (ref 0–22)
TURBIDITY: CLEAR
URINE HGB: NEGATIVE
UROBILINOGEN, URINE: NORMAL
WBC # BLD: 10.1 K/UL (ref 3.5–11.3)
WBC # BLD: ABNORMAL 10*3/UL
WBC UA: NORMAL /HPF (ref 0–5)
YEAST: NORMAL

## 2019-05-30 PROCEDURE — 83605 ASSAY OF LACTIC ACID: CPT

## 2019-05-30 PROCEDURE — 81001 URINALYSIS AUTO W/SCOPE: CPT

## 2019-05-30 PROCEDURE — 84484 ASSAY OF TROPONIN QUANT: CPT

## 2019-05-30 PROCEDURE — 99285 EMERGENCY DEPT VISIT HI MDM: CPT

## 2019-05-30 PROCEDURE — 2580000003 HC RX 258: Performed by: EMERGENCY MEDICINE

## 2019-05-30 PROCEDURE — 82010 KETONE BODYS QUAN: CPT

## 2019-05-30 PROCEDURE — 71046 X-RAY EXAM CHEST 2 VIEWS: CPT

## 2019-05-30 PROCEDURE — 80053 COMPREHEN METABOLIC PANEL: CPT

## 2019-05-30 PROCEDURE — 85025 COMPLETE CBC W/AUTO DIFF WBC: CPT

## 2019-05-30 PROCEDURE — 36415 COLL VENOUS BLD VENIPUNCTURE: CPT

## 2019-05-30 PROCEDURE — 93005 ELECTROCARDIOGRAM TRACING: CPT | Performed by: EMERGENCY MEDICINE

## 2019-05-30 RX ORDER — 0.9 % SODIUM CHLORIDE 0.9 %
1000 INTRAVENOUS SOLUTION INTRAVENOUS ONCE
Status: COMPLETED | OUTPATIENT
Start: 2019-05-30 | End: 2019-05-30

## 2019-05-30 RX ADMIN — SODIUM CHLORIDE 1000 ML: 9 INJECTION, SOLUTION INTRAVENOUS at 14:45

## 2019-05-30 ASSESSMENT — ENCOUNTER SYMPTOMS
SORE THROAT: 0
NAUSEA: 0
BACK PAIN: 0
ABDOMINAL PAIN: 1
DIARRHEA: 0
COUGH: 0
SHORTNESS OF BREATH: 0
VOMITING: 0

## 2019-05-30 NOTE — ED NOTES
Per mother who accompanied pt, his has a history of mild MRDD. Mom states pt lives alone and has home health come in to help treat his diabetes. Pt denies pain and is alert, cooperative. Per mom, he has not been acting right and his blood sugars have been fluctuating with recent adjustments in his insulin.      Arely Masters RN  05/30/19 2299

## 2019-05-30 NOTE — ED PROVIDER NOTES
[facility] ED  eMERGENCY dEPARTMENT eNCOUnter      Pt Name: Meg Montgomery  MRN: [id number]  [place] 1965  Date of evaluation: 5/30/2019  Provider: Katie Foreman [facility]       Chief Complaint   Patient presents with    Altered Mental Status     Onset 2 weeks ago per mother. Per mother, pt has had adjustments in his insulin recently         HISTORY OF PRESENT ILLNESS   (Location/Symptom, Timing/Onset, Context/Setting, Quality, Duration, Modifying Factors, Severity) Note limiting factors. HPI    Meg Montgomery is a 53 y.o. male who presents to the emergency department with complaint of altered mental status. Patient has MRDD and is a diabetic. Mother reports over the past 2 weeks she's noticed that he said fluctuations in his mental status and that his glucose has been running higher than normal.  The patient states that he's had pain with urination. Mother states that with the persistent nature symptoms she had concern for underlying infectious process and therefore brings him in for evaluation      Nursing Notes were reviewed. REVIEW OF SYSTEMS    (2+ for level 4; 10+ for level 5)   Review of Systems   Constitutional: Negative for chills, fatigue and fever. HENT: Negative for congestion and sore throat. Respiratory: Negative for cough and shortness of breath. Cardiovascular: Negative for chest pain. Gastrointestinal: Positive for abdominal pain. Negative for diarrhea, nausea and vomiting. Genitourinary: Positive for dysuria. Musculoskeletal: Negative for back pain and myalgias. Skin: Negative for rash. Neurological: Negative for dizziness and headaches. All other systems reviewed and are negative.       PAST MEDICAL HISTORY     Past Medical History:   Diagnosis Date    Hyperlipidemia     Nephrolithiasis 2007    Tobacco abuse     Type 2 diabetes mellitus ([facility] [address]        SURGICAL HISTORY       Past Surgical History:   Procedure Laterality Date    APPENDECTOMY 1978    COLONOSCOPY  3/3/15    -polyps    TONSILLECTOMY  1974       CURRENT MEDICATIONS       Previous Medications    ATORVASTATIN (LIPITOR) 80 MG TABLET    TAKE 1 TABLET BY MOUTH EVERY DAY    BLOOD GLUCOSE TEST STRIPS (ACCU-CHEK SMARTVIEW) STRIP    TEST ONCE DAILY. E11.9    BOOSTRIX 5-2.5-18.5 INJECTION        FLUOXETINE (PROZAC) 10 MG CAPSULE    Take 1 capsule by mouth daily    GLIMEPIRIDE (AMARYL) 2 MG TABLET    Take 2 tablets by mouth 2 times daily    INSULIN DETEMIR (LEVEMIR FLEXTOUCH) 100 UNIT/ML INJECTION PEN    Inject 30 Units into the skin daily    INSULIN PEN NEEDLE (BD PEN NEEDLE ERVIN U/F) 32G X 4 MM MISC    1 each by Does not apply route daily    LANCETS MISC    1 each by Does not apply route 2 times daily    LINAGLIPTIN (TRADJENTA) 5 MG TABLET    Take 1 tablet by mouth daily    METFORMIN (GLUCOPHAGE) 850 MG TABLET    Take 1 tablet by mouth 2 times daily (with meals) TAKE 1 TABLET BY MOUTH 2 TIMES DAILY    MULTIPLE VITAMINS-MINERALS (ICAPS PO)    Take 1 tablet by mouth daily. OMEPRAZOLE (PRILOSEC) 20 MG DELAYED RELEASE CAPSULE    TAKE 1 CAPSULE BY MOUTH DAILY    TAMSULOSIN (FLOMAX) 0.4 MG CAPSULE    Take 1 capsule by mouth daily for 5 days    TERBINAFINE (LAMISIL) 250 MG TABLET    Take 1 tablet by mouth daily       ALLERGIES     Patient has no known allergies.     FAMILY HISTORY       Family History   Problem Relation Age of Onset    High Blood Pressure Father         SOCIAL HISTORY       Social History     Socioeconomic History    Marital status:      Spouse name: None    Number of children: None    Years of education: None    Highest education level: None   Occupational History    None   Social Needs    Financial resource strain: None    Food insecurity:     Worry: None     Inability: None    Transportation needs:     Medical: None     Non-medical: None   Tobacco Use    Smoking status: Current Every Day Smoker     Packs/day: 0.50     Years: 16.00     Pack years: 8.00 Types: Cigarettes    Smokeless tobacco: Never Used   Substance and Sexual Activity    Alcohol use: Yes     Alcohol/week: 0.0 oz    Drug use: No    Sexual activity: None   Lifestyle    Physical activity:     Days per week: None     Minutes per session: None    Stress: None   Relationships    Social connections:     Talks on phone: None     Gets together: None     Attends Mandaeism service: None     Active member of club or organization: None     Attends meetings of clubs or organizations: None     Relationship status: None    Intimate partner violence:     Fear of current or ex partner: None     Emotionally abused: None     Physically abused: None     Forced sexual activity: None   Other Topics Concern    None   Social History Narrative    None       SCREENINGS           PHYSICAL EXAM    (up to 7 for level 4, 8 or more for level 5)   @EDTRIAGEVSS    Physical Exam   Constitutional: He is oriented to person, place, and time. He appears well-developed and well-nourished. No distress. HENT:   Head: Normocephalic and atraumatic. Right Ear: External ear normal.   Left Ear: External ear normal.   Nose: Nose normal.   Mouth/Throat: Oropharynx is clear and moist. No oropharyngeal exudate. Eyes: Pupils are equal, round, and reactive to light. Conjunctivae and EOM are normal. Right eye exhibits no discharge. Left eye exhibits no discharge. No scleral icterus. Neck: Normal range of motion. Neck supple. Cardiovascular: Normal rate, regular rhythm, normal heart sounds and intact distal pulses. Exam reveals no gallop and no friction rub. No murmur heard. Radial pulses are +2 out of 4 bilaterally there is equal and symmetric   Pulmonary/Chest: Effort normal and breath sounds normal. No stridor. No respiratory distress. He has no wheezes. He exhibits no tenderness. Abdominal: Soft. Bowel sounds are normal. He exhibits no distension. There is tenderness. There is no guarding.    Abdomen is soft and nondistended Notable for the following components:    Glucose 201 (*)     Bun/Cre Ratio 23 (*)     All other components within normal limits   URINE RT REFLEX TO CULTURE - Abnormal; Notable for the following components:    Specific Gravity, UA <1.005 (*)     All other components within normal limits   TROPONIN   BETA-HYDROXYBUTYRATE   LACTIC ACID, PLASMA   MICROSCOPIC URINALYSIS        All other labs were within normal range or not returned as of this dictation. EMERGENCY DEPARTMENT COURSE and DIFFERENTIAL DIAGNOSIS/MDM:   Vitals:    Vitals:    05/30/19 1403   BP: (!) 157/89   Pulse: 76   Resp: 16   Temp: 97.4 °F (36.3 °C)   TempSrc: Tympanic   SpO2: 98%   Weight: 190 lb (86.2 kg)   Height: 6' 2\" (1.88 m)       Medications   0.9 % sodium chloride IV bolus 1,000 mL (has no administration in time range)       MDM. Patient arrived afebrile with stable vitals. He was awake alert and oriented with normal neurologic exam.  With report of fluctuating blood sugar there was concern for possible infection as the cause. Workup showed no elevation to his white count no lactic acidosis normal chest x-ray and clear urine. He also was not in DKA. On reevaluation he was awake and alert and at his baseline mental status. Therefore this time with overall negative workup and persistently normal neuro exam he'll be discharged home    REVAL:         [address] time was minutes, excluding separately reportable procedures. There was a high probability of clinically significant/life threatening deterioration in the patient's condition which required my urgent intervention. CONSULTS:  None    PROCEDURES:  Unless otherwise noted below, none     Procedures    FINAL IMPRESSION      1. Confusion    2.  Hyperglycemia          DISPOSITION/PLAN   DISPOSITION        PATIENT REFERRED TO:  Aram Crook MD  [place] [address], [address]  [address]  [phone]    Schedule an appointment as soon as possible for a visit in 5 days  For repeat evaluation      DISCHARGE MEDICATIONS:  New Prescriptions    No medications on file          (Please note:  Portions of this note were completed with a voice recognition program.  Efforts were made to edit the dictations but occasionally words and phrases are mis-transcribed.)  Form v2016. [id number]    Dwayne Triana DO (electronically signed)  Emergency Medicine Provider        DO Loren  05/30/19 1524

## 2019-05-31 LAB
EKG ATRIAL RATE: 73 BPM
EKG P AXIS: 58 DEGREES
EKG P-R INTERVAL: 186 MS
EKG Q-T INTERVAL: 374 MS
EKG QRS DURATION: 92 MS
EKG QTC CALCULATION (BAZETT): 412 MS
EKG R AXIS: 47 DEGREES
EKG T AXIS: 60 DEGREES
EKG VENTRICULAR RATE: 73 BPM

## 2019-05-31 PROCEDURE — 93010 ELECTROCARDIOGRAM REPORT: CPT | Performed by: INTERNAL MEDICINE

## 2019-07-08 ENCOUNTER — HOSPITAL ENCOUNTER (OUTPATIENT)
Dept: DIABETES SERVICES | Age: 54
Setting detail: THERAPIES SERIES
Discharge: HOME | End: 2019-07-08
Payer: MEDICARE

## 2019-07-08 PROCEDURE — G0108 DIAB MANAGE TRN  PER INDIV: HCPCS

## 2019-07-09 SDOH — ECONOMIC STABILITY: FOOD INSECURITY: ADDITIONAL INFORMATION: NO

## 2019-07-12 ENCOUNTER — HOSPITAL ENCOUNTER (OUTPATIENT)
Dept: CT IMAGING | Age: 54
Discharge: HOME | End: 2019-07-14
Payer: MEDICARE

## 2019-07-12 ENCOUNTER — TELEPHONE (OUTPATIENT)
Dept: UROLOGY | Age: 54
End: 2019-07-12

## 2019-07-12 ENCOUNTER — OFFICE VISIT (OUTPATIENT)
Dept: UROLOGY | Age: 54
End: 2019-07-12
Payer: MEDICARE

## 2019-07-12 ENCOUNTER — HOSPITAL ENCOUNTER (OUTPATIENT)
Age: 54
Setting detail: SPECIMEN
Discharge: HOME | End: 2019-07-12
Payer: MEDICARE

## 2019-07-12 VITALS
SYSTOLIC BLOOD PRESSURE: 118 MMHG | DIASTOLIC BLOOD PRESSURE: 82 MMHG | TEMPERATURE: 98.2 F | BODY MASS INDEX: 25.84 KG/M2 | WEIGHT: 195 LBS | HEIGHT: 73 IN

## 2019-07-12 DIAGNOSIS — R35.0 FREQUENCY OF URINATION: ICD-10-CM

## 2019-07-12 DIAGNOSIS — N40.1 BPH WITH OBSTRUCTION/LOWER URINARY TRACT SYMPTOMS: ICD-10-CM

## 2019-07-12 DIAGNOSIS — K59.00 CONSTIPATION, UNSPECIFIED CONSTIPATION TYPE: ICD-10-CM

## 2019-07-12 DIAGNOSIS — Z80.42 FAMILY HISTORY OF PROSTATE CANCER: ICD-10-CM

## 2019-07-12 DIAGNOSIS — N23 RENAL COLIC: ICD-10-CM

## 2019-07-12 DIAGNOSIS — R35.0 FREQUENCY OF URINATION: Primary | ICD-10-CM

## 2019-07-12 DIAGNOSIS — Z12.5 SCREENING PSA (PROSTATE SPECIFIC ANTIGEN): ICD-10-CM

## 2019-07-12 DIAGNOSIS — N13.8 BPH WITH OBSTRUCTION/LOWER URINARY TRACT SYMPTOMS: ICD-10-CM

## 2019-07-12 LAB
-: ABNORMAL
AMORPHOUS: ABNORMAL
BACTERIA: ABNORMAL
BILIRUBIN URINE: NEGATIVE
CASTS UA: ABNORMAL /LPF
COLOR: YELLOW
COMMENT UA: ABNORMAL
CRYSTALS, UA: ABNORMAL /HPF
EPITHELIAL CELLS UA: ABNORMAL /HPF (ref 0–5)
GLUCOSE URINE: NEGATIVE
KETONES, URINE: NEGATIVE
LEUKOCYTE ESTERASE, URINE: NEGATIVE
MUCUS: ABNORMAL
NITRITE, URINE: NEGATIVE
OTHER OBSERVATIONS UA: ABNORMAL
PH UA: 5.5 (ref 5–9)
PROTEIN UA: NEGATIVE
RBC UA: ABNORMAL /HPF (ref 0–2)
RENAL EPITHELIAL, UA: ABNORMAL /HPF
SPECIFIC GRAVITY UA: >1.03 (ref 1.01–1.02)
TRICHOMONAS: ABNORMAL
TURBIDITY: ABNORMAL
URINE HGB: NEGATIVE
UROBILINOGEN, URINE: NORMAL
WBC UA: ABNORMAL /HPF (ref 0–5)
YEAST: ABNORMAL

## 2019-07-12 PROCEDURE — 4004F PT TOBACCO SCREEN RCVD TLK: CPT | Performed by: NURSE PRACTITIONER

## 2019-07-12 PROCEDURE — 81001 URINALYSIS AUTO W/SCOPE: CPT

## 2019-07-12 PROCEDURE — 3017F COLORECTAL CA SCREEN DOC REV: CPT | Performed by: NURSE PRACTITIONER

## 2019-07-12 PROCEDURE — 74176 CT ABD & PELVIS W/O CONTRAST: CPT

## 2019-07-12 PROCEDURE — 87086 URINE CULTURE/COLONY COUNT: CPT

## 2019-07-12 PROCEDURE — G8417 CALC BMI ABV UP PARAM F/U: HCPCS | Performed by: NURSE PRACTITIONER

## 2019-07-12 PROCEDURE — G8427 DOCREV CUR MEDS BY ELIG CLIN: HCPCS | Performed by: NURSE PRACTITIONER

## 2019-07-12 PROCEDURE — 99205 OFFICE O/P NEW HI 60 MIN: CPT | Performed by: NURSE PRACTITIONER

## 2019-07-12 RX ORDER — TAMSULOSIN HYDROCHLORIDE 0.4 MG/1
0.4 CAPSULE ORAL EVERY EVENING
Qty: 30 CAPSULE | Refills: 11 | Status: SHIPPED | OUTPATIENT
Start: 2019-07-12 | End: 2019-08-21 | Stop reason: SDUPTHER

## 2019-07-12 RX ORDER — POLYETHYLENE GLYCOL 3350 17 G/17G
17 POWDER, FOR SOLUTION ORAL DAILY
Qty: 1530 G | Refills: 3 | Status: SHIPPED | OUTPATIENT
Start: 2019-07-12 | End: 2019-08-11

## 2019-07-12 NOTE — PATIENT INSTRUCTIONS
There are several changes you can make to your diet to help improve your urinary symptoms. The following have been shown to irritate the bladder and should be AVOIDED:  · Coffee  · Tea  · Dark colored sodas, avoid Mt. Dew also  · Alcohol  · Spicy foods  · Acidic foods      It is very important to have regular, daily, bowel movements because this will improve urinary symptoms. Take Miralax (or generic equivalent) 17 g once daily (one capful). Do not skip days. If 1 dose daily causes loose stools/diarrhea, decrease to 1/2 dose or 1/4 dose but be sure to continue taking it daily (it is not the type of medication that you can just use \"as needed,\" must be taken daily to be effective).      Be sure to increase fluids, aim for 80 oz of water per day

## 2019-07-13 LAB
CULTURE: NO GROWTH
Lab: NORMAL
SPECIMEN DESCRIPTION: NORMAL

## 2019-07-15 ENCOUNTER — TELEPHONE (OUTPATIENT)
Dept: UROLOGY | Age: 54
End: 2019-07-15

## 2019-07-17 ASSESSMENT — ENCOUNTER SYMPTOMS
SHORTNESS OF BREATH: 0
COLOR CHANGE: 0
EYE REDNESS: 0
NAUSEA: 0
CONSTIPATION: 1
VOMITING: 0
BACK PAIN: 0
EYE PAIN: 0
ABDOMINAL PAIN: 0
COUGH: 0
WHEEZING: 0

## 2019-07-22 ENCOUNTER — HOSPITAL ENCOUNTER (OUTPATIENT)
Dept: NUTRITION | Age: 54
Discharge: HOME | End: 2019-07-22
Payer: MEDICARE

## 2019-07-22 VITALS — WEIGHT: 191.38 LBS | BODY MASS INDEX: 25.36 KG/M2 | HEIGHT: 73 IN

## 2019-07-22 PROCEDURE — 97802 MEDICAL NUTRITION INDIV IN: CPT

## 2019-07-22 NOTE — PROGRESS NOTES
MNT provided for Diabetes    Altered nutrition-related lab values: A1c related to Organ/system dysfunction: diabetes as evidenced by Lab values:   Lab Results   Component Value Date    LABA1C 8.0 07/06/2019       Client data    Ht: 6'1\" Wt: 191# 6 oz BMI: 25.3 (overweight)   Weight changes: 3.5# weight loss CBW: 86.8 kg   BMR: 1744 calories Est. total calorie needs: ~6818-8335       Client overall goal for weight is for weight loss trend towards a healthier BMI. Current eating pattern is consistent in meal timing, but inconsistent in carbohydrate content. Use of whole grains, whole fruits and vegetables appear less than recommended quantities. 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 Client presents for MNT today with his parents and . Rainer with MRDD and he and his  prepare meals on Mondays and he eats the leftovers for dinner. Parents report of times when Rainer's blood sugar is 111 and he is symptomatic of hypoglycemia and the Sx are the same if he is 275. Rainer's father is newly Dx DM and family is very interested in what to eat for meals for diabetes management. Vegetables chosen are typically corn, peas, and mixed vegetables. He typically has cheerio's with toast for breakfast with black coffee. Lunch is either a bowl of cereal and a granola bar or bologna sandwich with yogurt or cottage cheese and water. [place] is typically pork chop, chicken, or hamburger with potatoes, and vegetable. He sometimes has blueberries or yogurt with the meal. Often does not have an evening snack as he c/o not feeling like eating. Client was educated on carbohydrate counting with the following goals at meals and snacks:  Breakfast: 60 grams  Lunch: 60 grams  Supper: 60 grams  Bedtime Snack: 30 grams    Client received information on limiting fat in diet to lessen heart disease risk, with goals of:   Total Fat: 62 grams  Saturated Fat: 14 grams  Trans Fat: 0 grams daily     Discussed and provided literature on:    Reading food labels, carbohydrate counting, importance of consistency of meal timing (eating meals every 4.5-5 hours), importance of carbohydrate consistency (carbohydrate recommendations as noted above), importance of physical activity with a minimum goal of 30 minutes 5 days per week, healthy snack options (fruit, vegetables, lite popcorn, wheat thins, etc.), plate method (half of 9\" plate with non starchy vegetables such as broccoli or cauliflower, with protein item such as chicken and ,starch option such as a potato sharing a quarter of the plate each) importance of consuming protein and carbohydrate foods together (for meal and snack satiety), and cooking methods (baking broiling, grilling) were discussed with Ericka Richardson, his parents, and . Rainer's mother stated she likes to bake pies and switched to diet jello in pies and is not baking as often. At work Ericka Richardson sometimes has a donut, encouraged cake donut, not iced. C/o pain that he saw urologist for, has not been drinking water as recommended, reinforced water consumption. Family and  acknowledged understanding, but noted it was a lot to absorb. Rainer received the following diet instruction materials:  Choose Your Foods, Diabetes Food Label, Carbohydrate Counting for People With Diabetes, Between [employer], Choose MyPlate, 2354 calorie meal plan for 7 days, Diabetes placemat, and a refrigerator paper. (Rainer's parent's were supplied resources as well to have at home). Client goals:    1. Eat meal or snack every 4.5-5 hours. 2. Add a 15 g carbohydrate snack at 10 am after work before he takes a nap (yogurt, 1 c milk, 1/2 sandwich, etc.)  3. Add a 15 gram carbohydrate snack at 3 pm to eat on his walk home.    4. Keep record of increased physical activity, carbohydrate intake, etc. When reporting symptomatic hypoglycemia, but his

## 2019-08-05 ENCOUNTER — HOSPITAL ENCOUNTER (OUTPATIENT)
Dept: DIABETES SERVICES | Age: 54
Setting detail: THERAPIES SERIES
Discharge: HOME | End: 2019-08-05
Payer: MEDICARE

## 2019-08-05 PROCEDURE — G0108 DIAB MANAGE TRN  PER INDIV: HCPCS

## 2019-08-05 NOTE — PROGRESS NOTES
Session  Topics & Learning Objectives:      Comments:   Diabetes disease process & Treatment process: Define diabetes & prediabetes; identify own type of diabetes; role of the pancreas; signs/symptoms; diagnostic criteria; prevention and treatment options; contributing factors. Rating  [x] 1  [] 2  [] 3  [] 4      [] NC  [] N/A   Rating  [] 1  [] 2  [x] 3  [] 4  [] NC  [] N/A     Rating  [] 1  [] 2  [] 3  [] 4  [] NC  [] N/A     Rating  [] 1  [] 2  [] 3  [] 4  [] NC  [] N/A      07/08/19-Identifies family members that have diabetes. Knows he needs insulin to control glucose. 08/05/19-Verbalizes understanding of insulin needed to control glucose level. Identifies liver as a source of glucose. Incorporating nutritional management into lifestyle: Describe effect of type, amount & timing of food on blood glucose; Describe basic meal planning techniques & current nutrition guidelines; Correctly read food labels & demonstrate CHO counting & portion control with personalized meal plan. Rating  [x] 1  [] 2  [] 3  [] 4  [] NC  [] N/A     Rating  [] 1  [] 2  [x] 3  [] 4  [] NC  [] N/A     Rating  [] 1  [] 2  [] 3  [] 4  [] NC  [] N/A     Rating  [] 1  [] 2  [] 3  [] 4  [] NC  [] N/A        07/08/19-Eats consistent meals and has a snack between breakfast and lunch. No bedtime snack and does not read label. 08/05/19-Has decreased portions and used plate method to gain better glucose control. Incorporating physical activity into lifestyle:   State effect of exercise on blood glucose levels. Identifies personal exercise plan and contraindications. Discussed safety tips while exercising. 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

## 2019-08-21 ENCOUNTER — OFFICE VISIT (OUTPATIENT)
Dept: UROLOGY | Age: 54
End: 2019-08-21
Payer: MEDICARE

## 2019-08-21 ENCOUNTER — HOSPITAL ENCOUNTER (OUTPATIENT)
Age: 54
Setting detail: SPECIMEN
Discharge: HOME | End: 2019-08-21
Payer: MEDICARE

## 2019-08-21 ENCOUNTER — HOSPITAL ENCOUNTER (OUTPATIENT)
Age: 54
Discharge: HOME | End: 2019-08-21
Payer: MEDICARE

## 2019-08-21 ENCOUNTER — TELEPHONE (OUTPATIENT)
Dept: UROLOGY | Age: 54
End: 2019-08-21

## 2019-08-21 VITALS
BODY MASS INDEX: 24.51 KG/M2 | SYSTOLIC BLOOD PRESSURE: 130 MMHG | TEMPERATURE: 97.9 F | DIASTOLIC BLOOD PRESSURE: 80 MMHG | WEIGHT: 191 LBS | HEIGHT: 74 IN

## 2019-08-21 DIAGNOSIS — N13.8 BPH WITH OBSTRUCTION/LOWER URINARY TRACT SYMPTOMS: ICD-10-CM

## 2019-08-21 DIAGNOSIS — Z80.42 FAMILY HISTORY OF PROSTATE CANCER: ICD-10-CM

## 2019-08-21 DIAGNOSIS — Z12.5 SCREENING PSA (PROSTATE SPECIFIC ANTIGEN): ICD-10-CM

## 2019-08-21 DIAGNOSIS — N40.1 BPH WITH OBSTRUCTION/LOWER URINARY TRACT SYMPTOMS: ICD-10-CM

## 2019-08-21 DIAGNOSIS — K59.00 CONSTIPATION, UNSPECIFIED CONSTIPATION TYPE: ICD-10-CM

## 2019-08-21 DIAGNOSIS — R33.9 INCOMPLETE BLADDER EMPTYING: Primary | ICD-10-CM

## 2019-08-21 DIAGNOSIS — R35.0 FREQUENCY OF URINATION: ICD-10-CM

## 2019-08-21 LAB
-: ABNORMAL
AMORPHOUS: ABNORMAL
BACTERIA: ABNORMAL
BILIRUBIN URINE: NEGATIVE
CASTS UA: ABNORMAL /LPF
COLOR: YELLOW
COMMENT UA: ABNORMAL
CRYSTALS, UA: ABNORMAL /HPF
EPITHELIAL CELLS UA: ABNORMAL /HPF (ref 0–5)
GLUCOSE URINE: ABNORMAL
KETONES, URINE: NEGATIVE
LEUKOCYTE ESTERASE, URINE: NEGATIVE
MUCUS: ABNORMAL
NITRITE, URINE: NEGATIVE
OTHER OBSERVATIONS UA: ABNORMAL
PH UA: 5.5 (ref 5–9)
PROSTATE SPECIFIC ANTIGEN: 1.44 UG/L
PROTEIN UA: NEGATIVE
RBC UA: ABNORMAL /HPF (ref 0–2)
RENAL EPITHELIAL, UA: ABNORMAL /HPF
SPECIFIC GRAVITY UA: 1.02 (ref 1.01–1.02)
TRICHOMONAS: ABNORMAL
TURBIDITY: CLEAR
URINE HGB: NEGATIVE
UROBILINOGEN, URINE: NORMAL
WBC UA: ABNORMAL /HPF (ref 0–5)
YEAST: ABNORMAL

## 2019-08-21 PROCEDURE — 51798 US URINE CAPACITY MEASURE: CPT | Performed by: NURSE PRACTITIONER

## 2019-08-21 PROCEDURE — 81001 URINALYSIS AUTO W/SCOPE: CPT

## 2019-08-21 PROCEDURE — G0103 PSA SCREENING: HCPCS

## 2019-08-21 PROCEDURE — 36415 COLL VENOUS BLD VENIPUNCTURE: CPT

## 2019-08-21 PROCEDURE — G8420 CALC BMI NORM PARAMETERS: HCPCS | Performed by: NURSE PRACTITIONER

## 2019-08-21 PROCEDURE — 87086 URINE CULTURE/COLONY COUNT: CPT

## 2019-08-21 PROCEDURE — 99214 OFFICE O/P EST MOD 30 MIN: CPT | Performed by: NURSE PRACTITIONER

## 2019-08-21 PROCEDURE — 4004F PT TOBACCO SCREEN RCVD TLK: CPT | Performed by: NURSE PRACTITIONER

## 2019-08-21 PROCEDURE — 3017F COLORECTAL CA SCREEN DOC REV: CPT | Performed by: NURSE PRACTITIONER

## 2019-08-21 PROCEDURE — G8428 CUR MEDS NOT DOCUMENT: HCPCS | Performed by: NURSE PRACTITIONER

## 2019-08-21 RX ORDER — TAMSULOSIN HYDROCHLORIDE 0.4 MG/1
0.4 CAPSULE ORAL EVERY EVENING
Qty: 90 CAPSULE | Refills: 3 | Status: SHIPPED | OUTPATIENT
Start: 2019-08-21 | End: 2020-09-30

## 2019-08-21 RX ORDER — POLYETHYLENE GLYCOL 3350 17 G/17G
17 POWDER, FOR SOLUTION ORAL DAILY
COMMUNITY

## 2019-08-21 ASSESSMENT — ENCOUNTER SYMPTOMS
SHORTNESS OF BREATH: 0
ABDOMINAL PAIN: 0
WHEEZING: 0
CONSTIPATION: 0
NAUSEA: 0
COLOR CHANGE: 0
EYE PAIN: 0
EYE REDNESS: 0
COUGH: 0
BACK PAIN: 0
VOMITING: 0

## 2019-08-21 NOTE — PROGRESS NOTES
capsule 0    BOOSTRIX 5-2.5-18.5 injection   0     No facility-administered encounter medications on file as of 8/21/2019. Current Outpatient Medications on File Prior to Visit   Medication Sig Dispense Refill    polyethylene glycol (GLYCOLAX) powder Take 17 g by mouth daily      atorvastatin (LIPITOR) 80 MG tablet Take 1 tablet by mouth daily TAKE 1 TABLET BY MOUTH EVERY DAY 30 tablet 3    FLUoxetine (PROZAC) 10 MG capsule Take 1 capsule by mouth daily 90 capsule 3    linagliptin (TRADJENTA) 5 MG tablet Take 1 tablet by mouth daily 90 tablet 3    metFORMIN (GLUCOPHAGE) 850 MG tablet Take 1 tablet by mouth 2 times daily (with meals) TAKE 1 TABLET BY MOUTH 2 TIMES DAILY 180 tablet 3    omeprazole (PRILOSEC) 20 MG delayed release capsule Take 1 capsule by mouth Daily TAKE 1 CAPSULE BY MOUTH DAILY 30 capsule 3    terbinafine (LAMISIL) 250 MG tablet Take 1 tablet by mouth daily 90 tablet 0    Lancets MISC 1 each by Does not apply route 2 times daily 100 each 3    blood glucose test strips (ACCU-CHEK SMARTVIEW) strip TEST ONCE DAILY. E11.9 100 strip 3    insulin detemir (LEVEMIR FLEXTOUCH) 100 UNIT/ML injection pen Inject 30 Units into the skin daily (Patient taking differently: Inject 25 Units into the skin daily ) 15 pen 0    Insulin Pen Needle (BD PEN NEEDLE ERVIN U/F) 32G X 4 MM MISC 1 each by Does not apply route daily 100 each 3    Multiple Vitamins-Minerals (ICAPS PO) Take 1 tablet by mouth daily.  BOOSTRIX 5-2.5-18.5 injection   0     No current facility-administered medications on file prior to visit. Patient has no known allergies.   Family History   Problem Relation Age of Onset    High Blood Pressure Father      Social History     Tobacco Use   Smoking Status Current Every Day Smoker    Packs/day: 0.50    Years: 16.00    Pack years: 8.00    Types: Cigarettes   Smokeless Tobacco Never Used       Social History     Substance and Sexual Activity   Alcohol Use Yes    Alcohol/week: Culture    IL MEASUREMENT,POST-VOID RESIDUAL VOLUME BY US,NON-IMAGING   3. BPH with obstruction/lower urinary tract symptoms  Urinalysis with Microscopic    Urine Culture    IL MEASUREMENT,POST-VOID RESIDUAL VOLUME BY US,NON-IMAGING   4. Constipation, unspecified constipation type     5. Screening PSA (prostate specific antigen)  Psa screening   6. Family history of prostate cancer  Psa screening           PLAN:  He will continue MiraLAX daily. He will continue Flomax daily. He will continue to avoid consumption of bladder irritants. We will check a UA C&S due to elevated PVR. We will check a PSA now and call him with these results. He will follow-up in 3 to 4 months to recheck a PVR or sooner if needed for new or worsening symptoms.

## 2019-08-23 ENCOUNTER — TELEPHONE (OUTPATIENT)
Dept: UROLOGY | Age: 54
End: 2019-08-23

## 2019-08-23 LAB
CULTURE: NO GROWTH
Lab: NORMAL
SPECIMEN DESCRIPTION: NORMAL

## 2019-08-26 ENCOUNTER — HOSPITAL ENCOUNTER (OUTPATIENT)
Dept: DIABETES SERVICES | Age: 54
Setting detail: THERAPIES SERIES
Discharge: HOME | End: 2019-08-26
Payer: MEDICARE

## 2019-08-26 PROCEDURE — G0108 DIAB MANAGE TRN  PER INDIV: HCPCS

## 2019-08-26 NOTE — PROGRESS NOTES
solving as key interventions to lower glucose. Time spent with patient: [id number]    Next Appointment: As needed      Instruction Method: [x]Lecture/Discussion  []Power Point Presentation  [x]Handouts  [x]Return Demonstration     Education Materials/Equipment Provided:      []Self-Management - Initial assessment - ADA  Where do I Begin, Living with Type 2 diabetes\" book;  Diet meal planning basics, handout on diabetes education classes, hyper/hypoglycemia signs/symptoms and treatment handout; Diabetes zones; Support plan resource list.      []Self-Management  Class 1 - Living Well with Diabetes\" Booklet. Healthy Interactions [facility] \"On The Road To Better Managing Your Diabetes\". [] Self-Management  Class 2 -  \"Traveling with Diabetes\", Dining Out With Diabetes, \"Coping with Diabetes\", \"Diabetes Disaster Planning\", \"Know Your Numbers/Diabetes Care Checklist\", [address] [place] [address] Blood Sugar, Low Blood Sugar. Healthy Interactions Map \"Monitoring Your Blood Glucose. \"     [] Self-Management  Class 3 - \"Risk Factors for CVD\", foot care tips sheet and \"How to pick the right shoe\", \"Medications Used To Treat Diabetes\", How To Care For Your Teeth and Gums\", \"Vaccinations For Adults with Diabetes\", \"Type 2 diabetes and the role of GLP-1\". Healthy Interactions Map \"Continuing Your Journey\". []Self-Management - 3 month follow-up      []Glucose Meter      []Insulin Kit      []Other        Handouts/Booklets given:     [] Diabetes Your Take Control Guide   [] Daily Diabetic Meal Planning Guide   [] Nutrition in the [facility] [address]    [x] Resources for People With Diabetes   [x] Other Medications for Type 2 diabetes, Handouts on Caring for Feet, Picking the Right Shoe, Sick Day and Travel with diabetes given      Diabetes Self Management Support Plan: To assist and support your continued progress in managing your diabetes following    education    (  )  Gym or exercise program of your choice.

## 2019-09-16 ENCOUNTER — HOSPITAL ENCOUNTER (OUTPATIENT)
Age: 54
Setting detail: SPECIMEN
Discharge: HOME | End: 2019-09-16
Payer: MEDICARE

## 2019-09-16 DIAGNOSIS — L03.116 CELLULITIS OF LEFT LOWER EXTREMITY: ICD-10-CM

## 2019-09-16 PROCEDURE — 87070 CULTURE OTHR SPECIMN AEROBIC: CPT

## 2019-09-16 PROCEDURE — 87077 CULTURE AEROBIC IDENTIFY: CPT

## 2019-09-16 PROCEDURE — 87205 SMEAR GRAM STAIN: CPT

## 2019-09-16 PROCEDURE — 87186 SC STD MICRODIL/AGAR DIL: CPT

## 2019-09-19 LAB
CULTURE: ABNORMAL
CULTURE: ABNORMAL
DIRECT EXAM: ABNORMAL
Lab: ABNORMAL
SPECIMEN DESCRIPTION: ABNORMAL

## 2019-09-20 PROBLEM — Z12.5 SCREENING PSA (PROSTATE SPECIFIC ANTIGEN): Status: RESOLVED | Noted: 2019-08-21 | Resolved: 2019-09-20

## 2019-10-02 ENCOUNTER — HOSPITAL ENCOUNTER (OUTPATIENT)
Dept: MRI IMAGING | Age: 54
Discharge: HOME | End: 2019-10-04
Payer: MEDICARE

## 2019-10-02 DIAGNOSIS — I63.81: ICD-10-CM

## 2019-10-02 PROCEDURE — 70551 MRI BRAIN STEM W/O DYE: CPT

## 2019-11-25 ENCOUNTER — OFFICE VISIT (OUTPATIENT)
Dept: UROLOGY | Age: 54
End: 2019-11-25
Payer: MEDICARE

## 2019-11-25 VITALS
BODY MASS INDEX: 25.31 KG/M2 | SYSTOLIC BLOOD PRESSURE: 142 MMHG | DIASTOLIC BLOOD PRESSURE: 78 MMHG | WEIGHT: 191 LBS | HEIGHT: 73 IN

## 2019-11-25 DIAGNOSIS — N13.8 BPH WITH OBSTRUCTION/LOWER URINARY TRACT SYMPTOMS: Primary | ICD-10-CM

## 2019-11-25 DIAGNOSIS — R33.9 INCOMPLETE BLADDER EMPTYING: ICD-10-CM

## 2019-11-25 DIAGNOSIS — N20.0 RENAL CALCULUS: ICD-10-CM

## 2019-11-25 DIAGNOSIS — R35.0 FREQUENCY OF URINATION: ICD-10-CM

## 2019-11-25 DIAGNOSIS — N40.1 BPH WITH OBSTRUCTION/LOWER URINARY TRACT SYMPTOMS: Primary | ICD-10-CM

## 2019-11-25 PROCEDURE — 99213 OFFICE O/P EST LOW 20 MIN: CPT | Performed by: UROLOGY

## 2019-11-25 PROCEDURE — G8598 ASA/ANTIPLAT THER USED: HCPCS | Performed by: UROLOGY

## 2019-11-25 PROCEDURE — 3017F COLORECTAL CA SCREEN DOC REV: CPT | Performed by: UROLOGY

## 2019-11-25 PROCEDURE — G8427 DOCREV CUR MEDS BY ELIG CLIN: HCPCS | Performed by: UROLOGY

## 2019-11-25 PROCEDURE — 4004F PT TOBACCO SCREEN RCVD TLK: CPT | Performed by: UROLOGY

## 2019-11-25 PROCEDURE — G8482 FLU IMMUNIZE ORDER/ADMIN: HCPCS | Performed by: UROLOGY

## 2019-11-25 PROCEDURE — 51798 US URINE CAPACITY MEASURE: CPT | Performed by: UROLOGY

## 2019-11-25 PROCEDURE — G8417 CALC BMI ABV UP PARAM F/U: HCPCS | Performed by: UROLOGY

## 2019-11-25 ASSESSMENT — ENCOUNTER SYMPTOMS
BACK PAIN: 0
ABDOMINAL PAIN: 0
VOMITING: 0
WHEEZING: 0
COLOR CHANGE: 0
NAUSEA: 0
EYE REDNESS: 0
COUGH: 0
SHORTNESS OF BREATH: 0
EYE PAIN: 0

## 2020-06-01 ENCOUNTER — HOSPITAL ENCOUNTER (OUTPATIENT)
Age: 55
Discharge: HOME | End: 2020-06-01
Payer: MEDICARE

## 2020-06-01 LAB
ABSOLUTE EOS #: 0.18 K/UL (ref 0–0.44)
ABSOLUTE IMMATURE GRANULOCYTE: 0.04 K/UL (ref 0–0.3)
ABSOLUTE LYMPH #: 1.56 K/UL (ref 1.1–3.7)
ABSOLUTE MONO #: 0.65 K/UL (ref 0.1–1.2)
ALBUMIN SERPL-MCNC: 4 G/DL (ref 3.5–5.2)
ALBUMIN/GLOBULIN RATIO: 1.5 (ref 1–2.5)
ALP BLD-CCNC: 82 U/L (ref 40–129)
ALT SERPL-CCNC: 17 U/L (ref 5–41)
ANION GAP SERPL CALCULATED.3IONS-SCNC: 12 MMOL/L (ref 9–17)
AST SERPL-CCNC: 13 U/L
BASOPHILS # BLD: 1 % (ref 0–2)
BASOPHILS ABSOLUTE: 0.08 K/UL (ref 0–0.2)
BILIRUB SERPL-MCNC: 0.58 MG/DL (ref 0.3–1.2)
BILIRUBIN DIRECT: <0.08 MG/DL
BILIRUBIN, INDIRECT: ABNORMAL MG/DL (ref 0–1)
BUN BLDV-MCNC: 18 MG/DL (ref 6–20)
CALCIUM SERPL-MCNC: 9.2 MG/DL (ref 8.6–10.4)
CHLORIDE BLD-SCNC: 103 MMOL/L (ref 98–107)
CHOLESTEROL/HDL RATIO: 2.6
CHOLESTEROL: 85 MG/DL
CO2: 23 MMOL/L (ref 20–31)
CREAT SERPL-MCNC: 0.68 MG/DL (ref 0.7–1.2)
CREATININE URINE: 168 MG/DL (ref 39–259)
CREATININE URINE: 173.7 MG/DL (ref 39–259)
DIFFERENTIAL TYPE: ABNORMAL
EOSINOPHILS RELATIVE PERCENT: 2 % (ref 1–4)
ESTIMATED AVERAGE GLUCOSE: 174 MG/DL
GFR AFRICAN AMERICAN: >60 ML/MIN
GFR NON-AFRICAN AMERICAN: >60 ML/MIN
GFR SERPL CREATININE-BSD FRML MDRD: ABNORMAL ML/MIN/{1.73_M2}
GFR SERPL CREATININE-BSD FRML MDRD: ABNORMAL ML/MIN/{1.73_M2}
GLUCOSE BLD-MCNC: 107 MG/DL (ref 70–99)
HBA1C MFR BLD: 7.7 % (ref 4.8–5.9)
HCT VFR BLD CALC: 47.1 % (ref 40.7–50.3)
HDLC SERPL-MCNC: 33 MG/DL
HEMOGLOBIN: 15.6 G/DL (ref 13–17)
IMMATURE GRANULOCYTES: 0 %
LDL CHOLESTEROL: 40 MG/DL (ref 0–130)
LYMPHOCYTES # BLD: 16 % (ref 24–43)
MCH RBC QN AUTO: 33.4 PG (ref 25.2–33.5)
MCHC RBC AUTO-ENTMCNC: 33.1 G/DL (ref 28.4–34.8)
MCV RBC AUTO: 100.9 FL (ref 82.6–102.9)
MICROALBUMIN/CREAT 24H UR: 25 MG/L
MICROALBUMIN/CREAT UR-RTO: 14 MCG/MG CREAT
MONOCYTES # BLD: 7 % (ref 3–12)
NRBC AUTOMATED: 0 PER 100 WBC
PDW BLD-RTO: 13.6 % (ref 11.8–14.4)
PLATELET # BLD: 168 K/UL (ref 138–453)
PLATELET ESTIMATE: ABNORMAL
PMV BLD AUTO: 10.7 FL (ref 8.1–13.5)
POTASSIUM SERPL-SCNC: 4.3 MMOL/L (ref 3.7–5.3)
RBC # BLD: 4.67 M/UL (ref 4.21–5.77)
RBC # BLD: ABNORMAL 10*6/UL
SEG NEUTROPHILS: 74 % (ref 36–65)
SEGMENTED NEUTROPHILS ABSOLUTE COUNT: 7.53 K/UL (ref 1.5–8.1)
SODIUM BLD-SCNC: 138 MMOL/L (ref 135–144)
TOTAL PROTEIN: 6.6 G/DL (ref 6.4–8.3)
TRIGL SERPL-MCNC: 59 MG/DL
TSH SERPL DL<=0.05 MIU/L-ACNC: 1.09 MIU/L (ref 0.3–5)
VLDLC SERPL CALC-MCNC: ABNORMAL MG/DL (ref 1–30)
WBC # BLD: 10 K/UL (ref 3.5–11.3)
WBC # BLD: ABNORMAL 10*3/UL

## 2020-06-01 PROCEDURE — 80053 COMPREHEN METABOLIC PANEL: CPT

## 2020-06-01 PROCEDURE — 36415 COLL VENOUS BLD VENIPUNCTURE: CPT

## 2020-06-01 PROCEDURE — 82248 BILIRUBIN DIRECT: CPT

## 2020-06-01 PROCEDURE — 84443 ASSAY THYROID STIM HORMONE: CPT

## 2020-06-01 PROCEDURE — 82043 UR ALBUMIN QUANTITATIVE: CPT

## 2020-06-01 PROCEDURE — 82570 ASSAY OF URINE CREATININE: CPT

## 2020-06-01 PROCEDURE — 80061 LIPID PANEL: CPT

## 2020-06-01 PROCEDURE — 83036 HEMOGLOBIN GLYCOSYLATED A1C: CPT

## 2020-06-01 PROCEDURE — 85025 COMPLETE CBC W/AUTO DIFF WBC: CPT

## 2020-06-08 ENCOUNTER — HOSPITAL ENCOUNTER (OUTPATIENT)
Dept: GENERAL RADIOLOGY | Age: 55
Discharge: HOME | End: 2020-06-10
Payer: MEDICARE

## 2020-06-08 ENCOUNTER — HOSPITAL ENCOUNTER (OUTPATIENT)
Age: 55
Discharge: HOME | End: 2020-06-10
Payer: MEDICARE

## 2020-06-08 PROCEDURE — 74018 RADEX ABDOMEN 1 VIEW: CPT

## 2020-06-15 ENCOUNTER — OFFICE VISIT (OUTPATIENT)
Dept: UROLOGY | Age: 55
End: 2020-06-15
Payer: MEDICARE

## 2020-06-15 VITALS
SYSTOLIC BLOOD PRESSURE: 118 MMHG | BODY MASS INDEX: 25.71 KG/M2 | DIASTOLIC BLOOD PRESSURE: 68 MMHG | HEIGHT: 73 IN | WEIGHT: 194 LBS

## 2020-06-15 PROCEDURE — 99213 OFFICE O/P EST LOW 20 MIN: CPT | Performed by: UROLOGY

## 2020-06-15 RX ORDER — SEMAGLUTIDE 1.34 MG/ML
INJECTION, SOLUTION SUBCUTANEOUS
COMMUNITY
Start: 2020-06-02

## 2020-06-15 ASSESSMENT — ENCOUNTER SYMPTOMS
COUGH: 0
COLOR CHANGE: 0
WHEEZING: 0
EYE REDNESS: 0
BACK PAIN: 0
ABDOMINAL PAIN: 0
NAUSEA: 0
SHORTNESS OF BREATH: 0
VOMITING: 0
EYE PAIN: 0

## 2020-06-15 NOTE — PROGRESS NOTES
HPI:    Patient is a 63 y.o. male in no acute distress. He is alert and oriented to person, place, and time. History  7/2019 self referral for left flank pain and lower urinary tract symptoms. Halley Bonner is here with his mother today. Kyle Torrez does have MRDD and lives at a group home. He does claim that his right flank has been present for several weeks. He does state his current pain is 10/10. He has not taken anything for pain. He states he passed a stone yesterday, but did not save this for analysis. He also complains of frequency, urgency, and dysuria. He denies gross hematuria. He does admit to constipation, only have a BM 1-2 times per week. He consumes a large amount of bladder irritants. He also has uncontrolled DM. Most recent Hgb A1c was 8.0. He has never seen urology in the past.               Discussed bladder irritants thoroughly. Patient instructed to avoid/minimize intake of food/ drinks such as: coffee, tea, caffeine, alcohol, carbonated beverages, soda pop, spicy/acidic foods.                  I also explained the importance of having soft, daily bowel movements to improve his urinary symptoms.  He will take MiraLAX daily. Kyle Torrez was sent home with written instructions on how to take this medication.                 I did explain the importance of improved glycemic control to improve his urinary symptoms as well.  He was encouraged to work toward an A1c of 7 or less. Currently  Patient is here today for 6-month follow-up. At the last visit we had maintained MiraLAX and Flomax. Patient did get a recent KUB. This film was independently reviewed. Patient's KUB did not show any significant  calcifications. There was a copious amount of stool in the bowels. This may obstruct the view. Patient is on MiraLAX but he rarely consumes it. He is taking Flomax. He is happy with the Flomax. He does state that he has some bilateral lower quadrant pain occasionally.   I did tell him this is most likely related to constipation. No pain identified today. IPSS Questionnaire (AUA-7): Over the past month    1)  How often have you had a sensation of not emptying your bladder completely after you finish urinating? 0 - Not at all   2)  How often have you had to urinate again less than two hours after you finished urinating? 2 - Less than half the time   3)  How often have you found you stopped and started again several times when you urinated? 4 - More than half the time   4) How difficult have you found it to postpone urination? 1 - Less than 1 time in 5   5) How often have you had a weak urinary stream?  0 - Not at all   6) How often have you had to push or strain to begin urination? 0 - Not at all   7) How many times did you most typically get up to urinate from the time you went to bed until the time you got up in the morning? 1 - 1 time   Total  7    QOL 0     Past Medical History:   Diagnosis Date    Hyperlipidemia     Nephrolithiasis 2007    Tobacco abuse     Type 2 diabetes mellitus ([facility] [address]      Past Surgical History:   Procedure Laterality Date    APPENDECTOMY  1978    COLONOSCOPY  3/3/15    -polyps    TONSILLECTOMY  1974     Outpatient Encounter Medications as of 6/15/2020   Medication Sig Dispense Refill    blood glucose test strips (ACCU-CHEK SMARTVIEW) strip 1 each by Does not apply route daily TEST ONCE DAILY.  E11.9 100 strip 3    omeprazole (PRILOSEC) 20 MG delayed release capsule Take 1 capsule by mouth Daily 90 capsule 1    metFORMIN (GLUCOPHAGE) 850 MG tablet Take 1 tablet by mouth 2 times daily (with meals) 180 tablet 1    insulin detemir (LEVEMIR FLEXTOUCH) 100 UNIT/ML injection pen Inject 40 Units into the skin daily 15 pen 4    atorvastatin (LIPITOR) 80 MG tablet Take 1 tablet by mouth daily 90 tablet 1    Insulin Pen Needle (BD PEN NEEDLE ERVIN U/F) 32G X 4 MM MISC 1 each by Does not apply route daily 100 each 3    Multiple Vitamins-Minerals (VISION FORMULA PO) Take by Relation Age of Onset    High Blood Pressure Father      Social History     Tobacco Use   Smoking Status Current Every Day Smoker    Packs/day: 0.50    Years: 16.00    Pack years: 8.00    Types: Cigarettes   Smokeless Tobacco Never Used   Tobacco Comment    Pt states trying to quit       Social History     Substance and Sexual Activity   Alcohol Use Yes    Alcohol/week: 0.0 standard drinks       Review of Systems   Constitutional: Negative for appetite change, chills and fever. Eyes: Negative for pain, redness and visual disturbance. Respiratory: Negative for cough, shortness of breath and wheezing. Cardiovascular: Negative for chest pain and leg swelling. Gastrointestinal: Negative for abdominal pain, nausea and vomiting. Genitourinary: Negative for difficulty urinating, discharge, dysuria, flank pain, frequency, hematuria, scrotal swelling and testicular pain. Musculoskeletal: Negative for back pain, joint swelling and myalgias. Skin: Negative for color change, rash and wound. Neurological: Negative for dizziness, tremors and numbness. Hematological: Negative for adenopathy. Does not bruise/bleed easily. There were no vitals taken for this visit. PHYSICAL EXAM:  Constitutional: Patient in no acute distress; Neuro: alert and oriented to person place and time. Psych: Mood and affect normal.  Skin: Normal  Lungs: Respiratory effort normal  Cardiovascular:  Normal peripheral pulses  Abdomen: Soft, non-tender, non-distended with no CVA, flank pain, hepatosplenomegaly or hernia. Kidneys normal.  Bladder non-tender and not distended.   Lymphatics: no palpable lymphadenopathy  Penis normal  Urethral meatus normal  Scrotal exam normal  Testicles normal bilaterally  Epididymis normal bilaterally  No evidence of inguinal hernia      Lab Results   Component Value Date    BUN 18 06/01/2020     Lab Results   Component Value Date    CREATININE 0.68 (L) 06/01/2020     Lab Results

## 2020-06-15 NOTE — PATIENT INSTRUCTIONS
SURVEY:    You may be receiving a survey from [employer] regarding your visit today. Please complete the survey to enable us to provide the highest quality of care to you and your family. If you cannot score us a very good on any question, please call the office to discuss how we could of made your experience a very good one. Thank you.

## 2020-09-30 RX ORDER — TAMSULOSIN HYDROCHLORIDE 0.4 MG/1
CAPSULE ORAL
Qty: 90 CAPSULE | Refills: 3 | Status: SHIPPED | OUTPATIENT
Start: 2020-09-30 | End: 2021-01-04 | Stop reason: SDUPTHER

## 2021-02-01 ENCOUNTER — HOSPITAL ENCOUNTER (OUTPATIENT)
Age: 56
Discharge: HOME | End: 2021-02-01
Payer: MEDICARE

## 2021-02-01 ENCOUNTER — HOSPITAL ENCOUNTER (OUTPATIENT)
Dept: GENERAL RADIOLOGY | Age: 56
Discharge: HOME | End: 2021-02-03
Payer: MEDICARE

## 2021-02-01 ENCOUNTER — HOSPITAL ENCOUNTER (OUTPATIENT)
Age: 56
Discharge: HOME | End: 2021-02-03
Payer: MEDICARE

## 2021-02-01 DIAGNOSIS — R07.9 CHEST PAIN, UNSPECIFIED TYPE: ICD-10-CM

## 2021-02-01 LAB
CK MB: 2.4 NG/ML
TROPONIN INTERP: NORMAL
TROPONIN T: NORMAL NG/ML
TROPONIN, HIGH SENSITIVITY: <6 NG/L (ref 0–22)

## 2021-02-01 PROCEDURE — 82553 CREATINE MB FRACTION: CPT

## 2021-02-01 PROCEDURE — 71046 X-RAY EXAM CHEST 2 VIEWS: CPT

## 2021-02-01 PROCEDURE — 36415 COLL VENOUS BLD VENIPUNCTURE: CPT

## 2021-02-01 PROCEDURE — 84484 ASSAY OF TROPONIN QUANT: CPT

## 2021-02-01 NOTE — RESULT ENCOUNTER NOTE
Call pt caregiver, aj, at [phone] and let her know labs and xray were good, no acute findings. I am going to start him on protonix daily to hopefully help with GERD, I will send it into pharmacy right now, If he has worsening symptoms within next week or two, call office.

## 2021-03-15 ENCOUNTER — OFFICE VISIT (OUTPATIENT)
Dept: UROLOGY | Age: 56
End: 2021-03-15
Payer: MEDICARE

## 2021-03-15 ENCOUNTER — HOSPITAL ENCOUNTER (OUTPATIENT)
Age: 56
Setting detail: SPECIMEN
Discharge: HOME | End: 2021-03-15
Payer: MEDICARE

## 2021-03-15 VITALS
HEART RATE: 83 BPM | BODY MASS INDEX: 25.33 KG/M2 | WEIGHT: 192 LBS | DIASTOLIC BLOOD PRESSURE: 76 MMHG | SYSTOLIC BLOOD PRESSURE: 113 MMHG

## 2021-03-15 DIAGNOSIS — N20.0 RENAL CALCULUS: ICD-10-CM

## 2021-03-15 DIAGNOSIS — N23 RENAL COLIC ON LEFT SIDE: Primary | ICD-10-CM

## 2021-03-15 DIAGNOSIS — N52.9 ERECTILE DYSFUNCTION, UNSPECIFIED ERECTILE DYSFUNCTION TYPE: ICD-10-CM

## 2021-03-15 PROCEDURE — 82365 CALCULUS SPECTROSCOPY: CPT

## 2021-03-15 PROCEDURE — 99214 OFFICE O/P EST MOD 30 MIN: CPT | Performed by: UROLOGY

## 2021-03-15 RX ORDER — SILDENAFIL CITRATE 20 MG/1
60 TABLET ORAL PRN
Qty: 30 TABLET | Refills: 3 | Status: SHIPPED | OUTPATIENT
Start: 2021-03-15 | End: 2022-09-22 | Stop reason: SDUPTHER

## 2021-03-15 ASSESSMENT — ENCOUNTER SYMPTOMS
EYE REDNESS: 0
NAUSEA: 0
WHEEZING: 0
SHORTNESS OF BREATH: 0
VOMITING: 0
COUGH: 0
COLOR CHANGE: 0
ABDOMINAL PAIN: 0
EYE PAIN: 0
BACK PAIN: 0

## 2021-03-15 NOTE — PROGRESS NOTES
HPI:          Patient is a 61 y.o. male in no acute distress. He is alert and oriented to person, place, and time. History  7/2019 self referral for left flank pain and lower urinary tract symptoms. Daniel Nicely is here with his mother today. Maria E Perez does have MRDD and lives at a group home. He does claim that his right flank has been present for several weeks. He does state his current pain is 10/10. He has not taken anything for pain. He states he passed a stone yesterday, but did not save this for analysis. He also complains of frequency, urgency, and dysuria. He denies gross hematuria. He does admit to constipation, only have a BM 1-2 times per week. He consumes a large amount of bladder irritants. He also has uncontrolled DM. Most recent Hgb A1c was 8.0. He has never seen urology in the past.               Discussed bladder irritants thoroughly. Patient instructed to avoid/minimize intake of food/ drinks such as: coffee, tea, caffeine, alcohol, carbonated beverages, soda pop, spicy/acidic foods.                  I also explained the importance of having soft, daily bowel movements to improve his urinary symptoms.  He will take MiraLAX daily. Maria E Perez was sent home with written instructions on how to take this medication.                 I did explain the importance of improved glycemic control to improve his urinary symptoms as well.  He was encouraged to work toward an A1c of 7 or less.        Currently  Patient is here approximately 3 months early for his annual follow-up. He does state that he recently passed a stone. He says that he passed 2 stones last week. Patient is interested in therapy for erectile dysfunction. It has been sometime since he has had an adequate erection. Patient did try over-the-counter supplements. These did not work. Patient does have dull pain on the left side of his abdomen today. He has no current gross hematuria dysuria.   He does state that the pain is less since he passes Current Outpatient Medications on File Prior to Visit   Medication Sig Dispense Refill    BD PEN NEEDLE ERVIN U/F 32G X 4 MM MISC USE AS DIRECTED EVERY  each 3    pantoprazole (PROTONIX) 40 MG tablet Take 1 tablet by mouth every morning (before breakfast) 30 tablet 5    gabapentin (NEURONTIN) 300 MG capsule TAKE 1 TABLET BY MOUTH EVERY DAY.  aspirin EC 81 MG EC tablet Take 1 tablet by mouth daily 90 tablet 5    tamsulosin (FLOMAX) 0.4 MG capsule Take 1 capsule by mouth daily 90 capsule 3    omeprazole (PRILOSEC) 20 MG delayed release capsule Take 1 capsule by mouth Daily 90 capsule 3    FLUoxetine (PROZAC) 10 MG capsule Take 1 capsule by mouth daily 90 capsule 3    atorvastatin (LIPITOR) 80 MG tablet Take 1 tablet by mouth daily 90 tablet 3    insulin detemir (LEVEMIR FLEXTOUCH) 100 UNIT/ML injection pen Inject 10 Units into the skin daily 5 pen 4    metFORMIN (GLUCOPHAGE) 850 MG tablet Take 1 tablet by mouth 2 times daily (with meals) 180 tablet 1    OZEMPIC, 0.25 OR 0.5 MG/DOSE, 2 MG/1.5ML SOPN INJECT 0.25 MG SUBCUTANEOUS WEEKLY FOR 4 WEEKS, THEN INCREASE TO 0.5MG      blood glucose test strips (ACCU-CHEK SMARTVIEW) strip 1 each by Does not apply route daily TEST ONCE DAILY. E11.9 100 strip 3    Multiple Vitamins-Minerals (VISION FORMULA PO) Take by mouth      polyethylene glycol (GLYCOLAX) powder Take 17 g by mouth daily      Lancets MISC 1 each by Does not apply route 2 times daily 100 each 3     No current facility-administered medications on file prior to visit. Patient has no known allergies.   Family History   Problem Relation Age of Onset    High Blood Pressure Father      Social History     Tobacco Use   Smoking Status Current Every Day Smoker    Packs/day: 0.50    Years: 16.00    Pack years: 8.00    Types: Cigarettes   Smokeless Tobacco Never Used   Tobacco Comment    Pt states trying to quit       Social History     Substance and Sexual Activity   Alcohol Use Yes    Frequency: Monthly or less    Drinks per session: 1 or 2    Binge frequency: Never       Review of Systems   Constitutional: Negative for appetite change, chills and fever. Eyes: Negative for pain, redness and visual disturbance. Respiratory: Negative for cough, shortness of breath and wheezing. Cardiovascular: Negative for chest pain and leg swelling. Gastrointestinal: Negative for abdominal pain, nausea and vomiting. Genitourinary: Negative for difficulty urinating, discharge, dysuria, flank pain, frequency, hematuria, scrotal swelling and testicular pain. Musculoskeletal: Negative for back pain, joint swelling and myalgias. Skin: Negative for color change, rash and wound. Neurological: Negative for dizziness, tremors and numbness. Hematological: Negative for adenopathy. Does not bruise/bleed easily. /76 (Site: Right Upper Arm, Position: Sitting, Cuff Size: Large Adult)   Pulse 83   Wt 192 lb (87.1 kg)   BMI 25.33 kg/m²       PHYSICAL EXAM:  Constitutional: Patient in no acute distress; Neuro: alert and oriented to person place and time. Psych: Mood and affect normal.  Skin: Normal  Lungs: Respiratory effort normal  Cardiovascular:  Normal peripheral pulses  Abdomen: Soft, non-tender, non-distended with no CVA, flank pain, hepatosplenomegaly or hernia. Kidneys normal.  Bladder non-tender and not distended. Lymphatics: no palpable lymphadenopathy  Penis normal  Urethral meatus normal  Scrotal exam normal  Testicles normal bilaterally  Epididymis normal bilaterally  No evidence of inguinal hernia      Lab Results   Component Value Date    BUN 18 06/01/2020     Lab Results   Component Value Date    CREATININE 0.68 (L) 06/01/2020     Lab Results   Component Value Date    PSA 1.44 08/21/2019    PSA 1.50 10/08/2018       ASSESSMENT:  This is a 61 y.o. male with the following diagnoses:   Diagnosis Orders   1. Renal colic on left side  CT ABDOMEN PELVIS WO CONTRAST   2.  Erectile dysfunction, unspecified erectile dysfunction type     3. Renal calculus  Stone Analysis       PLAN:  We will send a stone for analysis today. We will go over the CT scan to rule out any further calculus. We will also start him on Revatio. We did start him on 60 mg as needed for erections. He does instructed to titrate up or down. He was given instructions on a max dose of 100 mg.

## 2021-03-18 LAB
STONE COMPOSITION: NORMAL
STONE DESCRIPTION: NORMAL
STONE MASS: 223 MG
STONE NUMBER: 2
STONE SIZE: NORMAL MM

## 2021-04-02 ENCOUNTER — HOSPITAL ENCOUNTER (OUTPATIENT)
Dept: CT IMAGING | Age: 56
Discharge: HOME | End: 2021-04-04
Payer: MEDICARE

## 2021-04-02 ENCOUNTER — HOSPITAL ENCOUNTER (OUTPATIENT)
Age: 56
Discharge: HOME | End: 2021-04-04
Payer: MEDICARE

## 2021-04-02 DIAGNOSIS — N23 RENAL COLIC ON LEFT SIDE: ICD-10-CM

## 2021-04-02 PROCEDURE — 74176 CT ABD & PELVIS W/O CONTRAST: CPT

## 2021-04-12 ENCOUNTER — OFFICE VISIT (OUTPATIENT)
Dept: UROLOGY | Age: 56
End: 2021-04-12
Payer: MEDICARE

## 2021-04-12 VITALS
WEIGHT: 188 LBS | DIASTOLIC BLOOD PRESSURE: 75 MMHG | SYSTOLIC BLOOD PRESSURE: 116 MMHG | BODY MASS INDEX: 24.8 KG/M2 | HEART RATE: 81 BPM

## 2021-04-12 DIAGNOSIS — N13.8 BPH WITH OBSTRUCTION/LOWER URINARY TRACT SYMPTOMS: ICD-10-CM

## 2021-04-12 DIAGNOSIS — N52.9 ERECTILE DYSFUNCTION, UNSPECIFIED ERECTILE DYSFUNCTION TYPE: ICD-10-CM

## 2021-04-12 DIAGNOSIS — N20.0 RENAL CALCULUS: Primary | ICD-10-CM

## 2021-04-12 DIAGNOSIS — N40.1 BPH WITH OBSTRUCTION/LOWER URINARY TRACT SYMPTOMS: ICD-10-CM

## 2021-04-12 PROCEDURE — 99213 OFFICE O/P EST LOW 20 MIN: CPT | Performed by: UROLOGY

## 2021-04-12 NOTE — PROGRESS NOTES
PO) Take by mouth      polyethylene glycol (GLYCOLAX) powder Take 17 g by mouth daily      Lancets MISC 1 each by Does not apply route 2 times daily 100 each 3     No facility-administered encounter medications on file as of 4/12/2021. Current Outpatient Medications on File Prior to Visit   Medication Sig Dispense Refill    Continuous Blood Gluc  (FREESTYLE HELEN 2 READER) LEROY USE AS DIRECTED TO CHECK BLOOD SUGAR 6 TIMES DAILY      BD PEN NEEDLE ERVIN U/F 32G X 4 MM MISC USE AS DIRECTED EVERY  each 3    sildenafil (REVATIO) 20 MG tablet Take 3 tablets by mouth as needed (erectile dysfunction) 30 tablet 3    pantoprazole (PROTONIX) 40 MG tablet Take 1 tablet by mouth every morning (before breakfast) 30 tablet 5    gabapentin (NEURONTIN) 300 MG capsule TAKE 1 TABLET BY MOUTH EVERY DAY.  aspirin EC 81 MG EC tablet Take 1 tablet by mouth daily 90 tablet 5    tamsulosin (FLOMAX) 0.4 MG capsule Take 1 capsule by mouth daily 90 capsule 3    omeprazole (PRILOSEC) 20 MG delayed release capsule Take 1 capsule by mouth Daily 90 capsule 3    FLUoxetine (PROZAC) 10 MG capsule Take 1 capsule by mouth daily 90 capsule 3    atorvastatin (LIPITOR) 80 MG tablet Take 1 tablet by mouth daily 90 tablet 3    insulin detemir (LEVEMIR FLEXTOUCH) 100 UNIT/ML injection pen Inject 10 Units into the skin daily 5 pen 4    metFORMIN (GLUCOPHAGE) 850 MG tablet Take 1 tablet by mouth 2 times daily (with meals) 180 tablet 1    OZEMPIC, 0.25 OR 0.5 MG/DOSE, 2 MG/1.5ML SOPN INJECT 0.25 MG SUBCUTANEOUS WEEKLY FOR 4 WEEKS, THEN INCREASE TO 0.5MG      blood glucose test strips (ACCU-CHEK SMARTVIEW) strip 1 each by Does not apply route daily TEST ONCE DAILY.  E11.9 100 strip 3    Multiple Vitamins-Minerals (VISION FORMULA PO) Take by mouth      polyethylene glycol (GLYCOLAX) powder Take 17 g by mouth daily      Lancets MISC 1 each by Does not apply route 2 times daily 100 each 3     No current facility-administered medications on file prior to visit. Patient has no known allergies. Family History   Problem Relation Age of Onset    High Blood Pressure Father      Social History     Tobacco Use   Smoking Status Current Every Day Smoker    Packs/day: 0.50    Years: 16.00    Pack years: 8.00    Types: Cigarettes   Smokeless Tobacco Never Used   Tobacco Comment    Pt states trying to quit       Social History     Substance and Sexual Activity   Alcohol Use Yes    Frequency: Monthly or less    Drinks per session: 1 or 2    Binge frequency: Never       Review of Systems    /75 (Site: Right Upper Arm, Position: Sitting, Cuff Size: Medium Adult)   Pulse 81   Wt 188 lb (85.3 kg)   BMI 24.80 kg/m²       PHYSICAL EXAM:  Constitutional: Patient in no acute distress; Neuro: alert and oriented to person place and time. Psych: Mood and affect normal.  Skin: Normal  Lungs: Respiratory effort normal  Cardiovascular:  Normal peripheral pulses  Abdomen: Soft, non-tender, non-distended with no CVA, flank pain, hepatosplenomegaly or hernia. Kidneys normal.  Bladder non-tender and not distended. Lymphatics: no palpable lymphadenopathy  Penis normal  Urethral meatus normal  Scrotal exam normal  Testicles normal bilaterally  Epididymis normal bilaterally  No evidence of inguinal hernia      Lab Results   Component Value Date    BUN 18 06/01/2020     Lab Results   Component Value Date    CREATININE 0.68 (L) 06/01/2020     Lab Results   Component Value Date    PSA 1.44 08/21/2019    PSA 1.50 10/08/2018       ASSESSMENT:  This is a 61 y.o. male with the following diagnoses:   Diagnosis Orders   1. Renal calculus  XR ABDOMEN (KUB) (SINGLE AP VIEW)   2. Erectile dysfunction, unspecified erectile dysfunction type     3. BPH with obstruction/lower urinary tract symptoms         PLAN:  Patient will follow up with us in 1 year.   He will follow-up with us sooner if he does have any further needs from erectile dysfunction standpoint.

## 2021-06-08 ENCOUNTER — HOSPITAL ENCOUNTER (OUTPATIENT)
Age: 56
Discharge: HOME | End: 2021-06-08
Payer: MEDICARE

## 2021-06-08 LAB
ABSOLUTE EOS #: 0.19 K/UL (ref 0–0.44)
ABSOLUTE IMMATURE GRANULOCYTE: 0.03 K/UL (ref 0–0.3)
ABSOLUTE LYMPH #: 1.64 K/UL (ref 1.1–3.7)
ABSOLUTE MONO #: 0.64 K/UL (ref 0.1–1.2)
ALBUMIN SERPL-MCNC: 3.6 G/DL (ref 3.5–5.2)
ALBUMIN/GLOBULIN RATIO: 1.2 (ref 1–2.5)
ALP BLD-CCNC: 94 U/L (ref 40–129)
ALT SERPL-CCNC: 18 U/L (ref 5–41)
ANION GAP SERPL CALCULATED.3IONS-SCNC: 7 MMOL/L (ref 9–17)
AST SERPL-CCNC: 17 U/L
BASOPHILS # BLD: 1 % (ref 0–2)
BASOPHILS ABSOLUTE: 0.07 K/UL (ref 0–0.2)
BILIRUB SERPL-MCNC: 0.34 MG/DL (ref 0.3–1.2)
BILIRUBIN DIRECT: <0.08 MG/DL
BILIRUBIN, INDIRECT: ABNORMAL MG/DL (ref 0–1)
BUN BLDV-MCNC: 13 MG/DL (ref 6–20)
CALCIUM SERPL-MCNC: 9 MG/DL (ref 8.6–10.4)
CHLORIDE BLD-SCNC: 103 MMOL/L (ref 98–107)
CHOLESTEROL/HDL RATIO: 3.2
CHOLESTEROL: 87 MG/DL
CO2: 28 MMOL/L (ref 20–31)
CREAT SERPL-MCNC: 0.74 MG/DL (ref 0.7–1.2)
CREATININE URINE: 136 MG/DL (ref 39–259)
DIFFERENTIAL TYPE: ABNORMAL
EOSINOPHILS RELATIVE PERCENT: 2 % (ref 1–4)
ESTIMATED AVERAGE GLUCOSE: 192 MG/DL
GFR AFRICAN AMERICAN: >60 ML/MIN
GFR NON-AFRICAN AMERICAN: >60 ML/MIN
GFR SERPL CREATININE-BSD FRML MDRD: ABNORMAL ML/MIN/{1.73_M2}
GFR SERPL CREATININE-BSD FRML MDRD: ABNORMAL ML/MIN/{1.73_M2}
GLUCOSE BLD-MCNC: 228 MG/DL (ref 70–99)
HBA1C MFR BLD: 8.3 % (ref 4–6)
HCT VFR BLD CALC: 45.6 % (ref 40.7–50.3)
HDLC SERPL-MCNC: 27 MG/DL
HEMOGLOBIN: 15 G/DL (ref 13–17)
IMMATURE GRANULOCYTES: 0 %
LDL CHOLESTEROL: 37 MG/DL (ref 0–130)
LYMPHOCYTES # BLD: 20 % (ref 24–43)
MCH RBC QN AUTO: 32.8 PG (ref 25.2–33.5)
MCHC RBC AUTO-ENTMCNC: 32.9 G/DL (ref 28.4–34.8)
MCV RBC AUTO: 99.6 FL (ref 82.6–102.9)
MICROALBUMIN/CREAT 24H UR: 19 MG/L
MICROALBUMIN/CREAT UR-RTO: 14 MCG/MG CREAT
MONOCYTES # BLD: 8 % (ref 3–12)
NRBC AUTOMATED: 0 PER 100 WBC
PDW BLD-RTO: 13.5 % (ref 11.8–14.4)
PLATELET # BLD: 163 K/UL (ref 138–453)
PLATELET ESTIMATE: ABNORMAL
PMV BLD AUTO: 10.6 FL (ref 8.1–13.5)
POTASSIUM SERPL-SCNC: 4.2 MMOL/L (ref 3.7–5.3)
RBC # BLD: 4.58 M/UL (ref 4.21–5.77)
RBC # BLD: ABNORMAL 10*6/UL
SEG NEUTROPHILS: 69 % (ref 36–65)
SEGMENTED NEUTROPHILS ABSOLUTE COUNT: 5.8 K/UL (ref 1.5–8.1)
SODIUM BLD-SCNC: 138 MMOL/L (ref 135–144)
TOTAL PROTEIN: 6.5 G/DL (ref 6.4–8.3)
TRIGL SERPL-MCNC: 114 MG/DL
TSH SERPL DL<=0.05 MIU/L-ACNC: 1.09 MIU/L (ref 0.3–5)
VLDLC SERPL CALC-MCNC: ABNORMAL MG/DL (ref 1–30)
WBC # BLD: 8.4 K/UL (ref 3.5–11.3)
WBC # BLD: ABNORMAL 10*3/UL

## 2021-06-08 PROCEDURE — 84443 ASSAY THYROID STIM HORMONE: CPT

## 2021-06-08 PROCEDURE — 80061 LIPID PANEL: CPT

## 2021-06-08 PROCEDURE — 85025 COMPLETE CBC W/AUTO DIFF WBC: CPT

## 2021-06-08 PROCEDURE — 80053 COMPREHEN METABOLIC PANEL: CPT

## 2021-06-08 PROCEDURE — 83036 HEMOGLOBIN GLYCOSYLATED A1C: CPT

## 2021-06-08 PROCEDURE — 82043 UR ALBUMIN QUANTITATIVE: CPT

## 2021-06-08 PROCEDURE — 82570 ASSAY OF URINE CREATININE: CPT

## 2021-06-08 PROCEDURE — 82248 BILIRUBIN DIRECT: CPT

## 2021-06-08 PROCEDURE — 36415 COLL VENOUS BLD VENIPUNCTURE: CPT

## 2021-10-05 ENCOUNTER — OFFICE VISIT (OUTPATIENT)
Dept: SURGERY | Age: 56
End: 2021-10-05
Payer: MEDICARE

## 2021-10-05 VITALS
RESPIRATION RATE: 18 BRPM | SYSTOLIC BLOOD PRESSURE: 121 MMHG | HEART RATE: 80 BPM | WEIGHT: 186.6 LBS | BODY MASS INDEX: 24.73 KG/M2 | TEMPERATURE: 98.6 F | DIASTOLIC BLOOD PRESSURE: 81 MMHG | HEIGHT: 73 IN

## 2021-10-05 DIAGNOSIS — Z72.0 TOBACCO ABUSE: ICD-10-CM

## 2021-10-05 DIAGNOSIS — Z01.818 PRE-OP TESTING: ICD-10-CM

## 2021-10-05 DIAGNOSIS — E11.42 TYPE 2 DIABETES MELLITUS WITH DIABETIC POLYNEUROPATHY, WITHOUT LONG-TERM CURRENT USE OF INSULIN (HCC): ICD-10-CM

## 2021-10-05 DIAGNOSIS — K59.00 CONSTIPATION, UNSPECIFIED CONSTIPATION TYPE: ICD-10-CM

## 2021-10-05 DIAGNOSIS — Z86.010 HISTORY OF COLON POLYPS: Primary | ICD-10-CM

## 2021-10-05 PROCEDURE — 99202 OFFICE O/P NEW SF 15 MIN: CPT | Performed by: SURGERY

## 2021-10-05 PROCEDURE — 3052F HG A1C>EQUAL 8.0%<EQUAL 9.0%: CPT | Performed by: SURGERY

## 2021-10-05 RX ORDER — SODIUM, POTASSIUM,MAG SULFATES 17.5-3.13G
1 SOLUTION, RECONSTITUTED, ORAL ORAL ONCE
Qty: 1 EACH | Refills: 0 | Status: SHIPPED | OUTPATIENT
Start: 2021-10-05 | End: 2021-10-05

## 2021-10-18 ENCOUNTER — HOSPITAL ENCOUNTER (OUTPATIENT)
Age: 56
Discharge: HOME | End: 2021-10-18
Payer: MEDICARE

## 2021-10-18 LAB
EKG ATRIAL RATE: 71 BPM
EKG P AXIS: 43 DEGREES
EKG P-R INTERVAL: 198 MS
EKG Q-T INTERVAL: 382 MS
EKG QRS DURATION: 90 MS
EKG QTC CALCULATION (BAZETT): 415 MS
EKG R AXIS: 51 DEGREES
EKG T AXIS: 57 DEGREES
EKG VENTRICULAR RATE: 71 BPM

## 2021-10-18 PROCEDURE — 93010 ELECTROCARDIOGRAM REPORT: CPT | Performed by: FAMILY MEDICINE

## 2021-10-18 PROCEDURE — 93005 ELECTROCARDIOGRAM TRACING: CPT | Performed by: SURGERY

## 2021-11-05 ENCOUNTER — TELEPHONE (OUTPATIENT)
Dept: SURGERY | Age: 56
End: 2021-11-05

## 2021-11-30 ASSESSMENT — ENCOUNTER SYMPTOMS
SHORTNESS OF BREATH: 0
NAUSEA: 0
ABDOMINAL PAIN: 0
CHOKING: 0
BLOOD IN STOOL: 0
SORE THROAT: 0
TROUBLE SWALLOWING: 0
COUGH: 0
VOMITING: 0
BACK PAIN: 0

## 2021-12-01 NOTE — PROGRESS NOTES
Jacob Garcia MD  General Surgery, Endoscopy  Chief Medical Officer    [facility]  [address] [address]  Dept: [phone]  Fax: [address]  Chief Complaint   Patient presents with    New Patient     Consult for screening colonoscopy. No previous colonoscopies. Patient without complaints. HPI    Mr Silva Ortiz is a pleasant 44-year-old white male kindly referred to me by Dr. Samantha Wilkerson for screening colonoscopy. Colonoscopy 2015 with adenomatous and hyperplastic sigmoid polypectomies. No abdominal pain. Occasional constipation. No rectal bleeding. Normal appetite. No recent weight changes, 186 pounds, BMI 25. No cough, fever nor other respiratory symptoms. No history of COVID-19, vaccination is complete. No family history of colon cancer to his knowledge. Patient smokes less than 1 pack/day. Review of Systems   Constitutional: Negative for activity change, appetite change, chills, fever and unexpected weight change. HENT: Negative for nosebleeds, sneezing, sore throat and trouble swallowing. Eyes: Negative for visual disturbance. Respiratory: Negative for cough, choking and shortness of breath. Cardiovascular: Negative for chest pain, palpitations and leg swelling. Gastrointestinal: Negative for abdominal pain, blood in stool, nausea and vomiting. Genitourinary: Positive for decreased urine volume. Negative for dysuria, flank pain and hematuria. Musculoskeletal: Positive for arthralgias. Negative for back pain, gait problem and myalgias. Allergic/Immunologic: Negative for immunocompromised state. Neurological: Negative for dizziness, seizures, syncope, weakness and headaches. Hematological: Does not bruise/bleed easily. Psychiatric/Behavioral: Negative for confusion and sleep disturbance.        Past Medical History:   Diagnosis Date    Hyperlipidemia     Nephrolithiasis 2007    Tobacco abuse     Type 2 capsule 5    aspirin EC 81 MG EC tablet Take 1 tablet by mouth daily 90 tablet 5     No current facility-administered medications for this visit. Social History     Socioeconomic History    Marital status: Single     Spouse name: None    Number of children: None    Years of education: None    Highest education level: None   Occupational History    None   Tobacco Use    Smoking status: Current Every Day Smoker     Packs/day: 0.50     Years: 16.00     Pack years: 8.00     Types: Cigarettes    Smokeless tobacco: Never Used    Tobacco comment: Pt states trying to quit   Vaping Use    Vaping Use: Never used   Substance and Sexual Activity    Alcohol use: Yes    Drug use: No    Sexual activity: None   Other Topics Concern    None   Social History Narrative    None     Social Determinants of Health     Financial Resource Strain: Low Risk     Difficulty of Paying Living Expenses: Not hard at all   Food Insecurity: No Food Insecurity    Worried About Running Out of Food in the Last Year: Never true    Annika of Food in the Last Year: Never true   Transportation Needs: No Transportation Needs    Lack of Transportation (Medical): No    Lack of Transportation (Non-Medical): No   Physical Activity: Insufficiently Active    Days of Exercise per Week: 3 days    Minutes of Exercise per Session: 20 min   Stress: No Stress Concern Present    Feeling of Stress : Not at all   Social Connections: Moderately Integrated    Frequency of Communication with Friends and Family: More than three times a week    Frequency of Social Gatherings with Friends and Family: Twice a week    Attends Spiritism Services: More than 4 times per year    Active Member of [address] [place] [address] or Organizations:  Yes    Attends Club or Organization Meetings: 1 to 4 times per year    Marital Status:    Intimate Partner Violence: Not At Risk    Fear of Current or Ex-Partner: No    Emotionally Abused: No    Physically Abused: No    Encounter    Component 3/3/15 1130   Surgical Pathology Report (NOTE)   [address]   [address], [place], [place] [address]   [phone]   Fax: [phone]   SURGICAL PATHOLOGY REPORT     Patient Name: Amy Michele   MR#: [id number]   Specimen #[id number]       Final Diagnosis   SIGMOID POLYPS:             - TUBULAR ADENOMAS (2):         -HYPERPLASTIC POLYP (1).     -PORTION OF COLONIC MUCOSA (1). Radha Gillespie M.D.   **Electronically Sig     Clinical Information   SCREENING COLONOSCOPY     Source:   A: SIGMOID POLYPS     Gross Description   Specimen is labeled as polyp sigmoid.  Specimen consists of four   irregular light gray-tan portions of soft tissue.  Each one measures   0.2 cm in greatest dimension.  Submitted entirely.   PK/km     Microscopic Description   Sections from above reveal portions of colonic mucosa.  Two of them   reveal crowded irregularly arranged glands lined by columnar   epithelium with hyperchromatic nuclei.  One of them reveals mucosa and   glands lined by serrated columnar epithelium and 1 portion reveals   normal appearance.  PK/edita    Resulting Agency [facility]            ASSESSMENT     Diagnosis Orders   1. History of colon polyps     2. Constipation, unspecified constipation type     3. BMI 24.0-24.9, adult     4. Type 2 diabetes mellitus with diabetic polyneuropathy, without long-term current use of insulin ([address]     5. Tobacco abuse     6. Pre-op testing         PLAN    Discussed with Mr. Sandra Quintero his history of hyperplastic and adenomatous sigmoid polyps in 2016, occasional constipation. We will proceed with colonoscopy.   Risks, benefits, alternatives thoroughly reviewed and accepted by  including GI bleeding, perforation, missed lesions, COVID-19 exposure/infection, etc.  Discussed importance of a healthy, balanced high-fiber low-fat diet with fiber supplementation, increased water intake and physical activity, decreased calories and sugar, etc.  Strongly encouraged complete tobacco cessation.      Jaron Donald MD

## 2021-12-01 NOTE — PATIENT INSTRUCTIONS
Patient Education        Learning About Colonoscopy  What is a colonoscopy? A colonoscopy is a test (also called a procedure) that lets a doctor look inside your large intestine. The doctor uses a thin, lighted tube called a colonoscope. The doctor uses it to look for small growths called polyps, colon or rectal cancer (colorectal cancer), or other problems like bleeding. During the procedure, the doctor can take samples of tissue. The samples can then be checked for cancer or other conditions. The doctor can also take out polyps. How is a colonoscopy done? This procedure is done in a doctor's office or a clinic or hospital. You will get medicine to help you relax and not feel pain. Some people find that they don't remember having the test because of the medicine. The doctor gently moves the colonoscope, or scope, through the colon. The scope is also a small video camera. It lets the doctor see the colon and take pictures. How do you prepare for the procedure? You need to clean out your colon before the procedure so the doctor can see your colon. This depends on which \"colon prep\" your doctor recommends. To clean out your colon, you'll do a \"colon prep\" before the test. This means you stop eating solid foods and drink only clear liquids. You can have water, tea, coffee, clear juices, clear broths, flavored ice pops, and gelatin (such as Jell-O). Do not drink anything red or purple. The day or night before the procedure, you drink a large amount of a special liquid. This causes loose, frequent stools. You will go to the bathroom a lot. Your doctor may have you drink part of the liquid the evening before and the rest on the day of the test. It's very important to drink all of the liquid. If you have problems drinking it, call your doctor. Arrange to have someone take you home after the test.  What can you expect after a colonoscopy?   Your doctor will tell you when you can eat and do your usual activities. Drink a lot of fluid after the test to replace the fluids you may have lost during the colon prep. But don't drink alcohol. Your doctor will talk to you about when you'll need your next colonoscopy. The results of your test and your risk for colorectal cancer will help your doctor decide how often you need to be checked. After the test, you may be bloated or have gas pains. You may need to pass gas. If a biopsy was done or a polyp was removed, you may have streaks of blood in your stool (feces) for a few days. Check with your doctor to see when it is safe to take aspirin and nonsteroidal anti-inflammatory drugs (NSAIDs) again. Problems such as heavy rectal bleeding may not occur until several weeks after the test. This isn't common. But it can happen after polyps are removed. Follow-up care is a key part of your treatment and safety. Be sure to make and go to all appointments, and call your doctor if you are having problems. It's also a good idea to know your test results and keep a list of the medicines you take. Where can you learn more? Go to [email or web address]. org and sign in to your [employer] account. Enter E572 in the [facility] box to learn more about \"Learning About Colonoscopy. \"     If you do not have an account, please click on the \"Sign Up Now\" link. Current as of: December 17, 2020               Content Version: 13.0  © 2905-7489 Healthwise, Incorporated. Care instructions adapted under license by [facility] ([facility]). If you have questions about a medical condition or this instruction, always ask your healthcare professional. [place] [address] any warranty or liability for your use of this information.

## 2022-04-05 ENCOUNTER — HOSPITAL ENCOUNTER (OUTPATIENT)
Dept: GENERAL RADIOLOGY | Age: 57
Discharge: HOME | End: 2022-04-07
Payer: MEDICARE

## 2022-04-05 ENCOUNTER — HOSPITAL ENCOUNTER (OUTPATIENT)
Age: 57
Discharge: HOME | End: 2022-04-07
Payer: MEDICARE

## 2022-04-05 DIAGNOSIS — N20.0 RENAL CALCULUS: ICD-10-CM

## 2022-04-05 PROCEDURE — 74018 RADEX ABDOMEN 1 VIEW: CPT

## 2022-04-11 ENCOUNTER — HOSPITAL ENCOUNTER (OUTPATIENT)
Age: 57
Discharge: HOME | End: 2022-04-11
Payer: MEDICARE

## 2022-04-11 ENCOUNTER — HOSPITAL ENCOUNTER (OUTPATIENT)
Age: 57
Setting detail: SPECIMEN
Discharge: HOME | End: 2022-04-11
Payer: MEDICARE

## 2022-04-11 ENCOUNTER — HOSPITAL ENCOUNTER (OUTPATIENT)
Dept: CT IMAGING | Age: 57
Discharge: HOME | End: 2022-04-13
Payer: MEDICARE

## 2022-04-11 ENCOUNTER — OFFICE VISIT (OUTPATIENT)
Dept: UROLOGY | Age: 57
End: 2022-04-11
Payer: MEDICARE

## 2022-04-11 VITALS
BODY MASS INDEX: 25.46 KG/M2 | WEIGHT: 193 LBS | HEART RATE: 82 BPM | SYSTOLIC BLOOD PRESSURE: 114 MMHG | DIASTOLIC BLOOD PRESSURE: 74 MMHG

## 2022-04-11 DIAGNOSIS — N40.1 BPH WITH OBSTRUCTION/LOWER URINARY TRACT SYMPTOMS: ICD-10-CM

## 2022-04-11 DIAGNOSIS — N20.0 RENAL CALCULUS: ICD-10-CM

## 2022-04-11 DIAGNOSIS — N13.8 BPH WITH OBSTRUCTION/LOWER URINARY TRACT SYMPTOMS: ICD-10-CM

## 2022-04-11 DIAGNOSIS — N23 RENAL COLIC: ICD-10-CM

## 2022-04-11 DIAGNOSIS — Z12.5 SCREENING PSA (PROSTATE SPECIFIC ANTIGEN): ICD-10-CM

## 2022-04-11 DIAGNOSIS — N23 RENAL COLIC: Primary | ICD-10-CM

## 2022-04-11 DIAGNOSIS — E11.42 TYPE 2 DIABETES MELLITUS WITH DIABETIC POLYNEUROPATHY, WITHOUT LONG-TERM CURRENT USE OF INSULIN (HCC): ICD-10-CM

## 2022-04-11 LAB
-: ABNORMAL
ABSOLUTE EOS #: 0.19 K/UL (ref 0–0.44)
ABSOLUTE IMMATURE GRANULOCYTE: 0.04 K/UL (ref 0–0.3)
ABSOLUTE LYMPH #: 1.69 K/UL (ref 1.1–3.7)
ABSOLUTE MONO #: 0.51 K/UL (ref 0.1–1.2)
ANION GAP SERPL CALCULATED.3IONS-SCNC: 9 MMOL/L (ref 9–17)
BACTERIA: ABNORMAL
BASOPHILS # BLD: 1 % (ref 0–2)
BASOPHILS ABSOLUTE: 0.05 K/UL (ref 0–0.2)
BILIRUBIN URINE: NEGATIVE
BUN BLDV-MCNC: 21 MG/DL (ref 6–20)
BUN/CREAT BLD: 31 (ref 9–20)
CALCIUM SERPL-MCNC: 9.1 MG/DL (ref 8.6–10.4)
CHLORIDE BLD-SCNC: 104 MMOL/L (ref 98–107)
CO2: 26 MMOL/L (ref 20–31)
COLOR: YELLOW
CREAT SERPL-MCNC: 0.67 MG/DL (ref 0.7–1.2)
EOSINOPHILS RELATIVE PERCENT: 2 % (ref 1–4)
EPITHELIAL CELLS UA: ABNORMAL /HPF (ref 0–5)
GFR AFRICAN AMERICAN: >60 ML/MIN
GFR NON-AFRICAN AMERICAN: >60 ML/MIN
GFR SERPL CREATININE-BSD FRML MDRD: ABNORMAL ML/MIN/{1.73_M2}
GFR SERPL CREATININE-BSD FRML MDRD: ABNORMAL ML/MIN/{1.73_M2}
GLUCOSE BLD-MCNC: 93 MG/DL (ref 70–99)
GLUCOSE URINE: ABNORMAL
HCT VFR BLD CALC: 49.2 % (ref 40.7–50.3)
HEMOGLOBIN: 16.3 G/DL (ref 13–17)
IMMATURE GRANULOCYTES: 0 %
KETONES, URINE: NEGATIVE
LEUKOCYTE ESTERASE, URINE: NEGATIVE
LYMPHOCYTES # BLD: 16 % (ref 24–43)
MCH RBC QN AUTO: 33.3 PG (ref 25.2–33.5)
MCHC RBC AUTO-ENTMCNC: 33.1 G/DL (ref 28.4–34.8)
MCV RBC AUTO: 100.4 FL (ref 82.6–102.9)
MONOCYTES # BLD: 5 % (ref 3–12)
MUCUS: ABNORMAL
NITRITE, URINE: NEGATIVE
NRBC AUTOMATED: 0 PER 100 WBC
PDW BLD-RTO: 13.6 % (ref 11.8–14.4)
PH UA: 5.5 (ref 5–9)
PLATELET # BLD: 169 K/UL (ref 138–453)
PMV BLD AUTO: 10.3 FL (ref 8.1–13.5)
POTASSIUM SERPL-SCNC: 4.2 MMOL/L (ref 3.7–5.3)
PROSTATE SPECIFIC ANTIGEN: 1.89 UG/L
PROTEIN UA: NEGATIVE
RBC # BLD: 4.9 M/UL (ref 4.21–5.77)
RBC UA: ABNORMAL /HPF (ref 0–2)
SEG NEUTROPHILS: 76 % (ref 36–65)
SEGMENTED NEUTROPHILS ABSOLUTE COUNT: 7.98 K/UL (ref 1.5–8.1)
SODIUM BLD-SCNC: 139 MMOL/L (ref 135–144)
SPECIFIC GRAVITY UA: 1.02 (ref 1.01–1.02)
TURBIDITY: CLEAR
URINE HGB: NEGATIVE
UROBILINOGEN, URINE: NORMAL
WBC # BLD: 10.5 K/UL (ref 3.5–11.3)
WBC UA: ABNORMAL /HPF (ref 0–5)

## 2022-04-11 PROCEDURE — 83036 HEMOGLOBIN GLYCOSYLATED A1C: CPT

## 2022-04-11 PROCEDURE — 81001 URINALYSIS AUTO W/SCOPE: CPT

## 2022-04-11 PROCEDURE — 99214 OFFICE O/P EST MOD 30 MIN: CPT | Performed by: NURSE PRACTITIONER

## 2022-04-11 PROCEDURE — 74176 CT ABD & PELVIS W/O CONTRAST: CPT

## 2022-04-11 PROCEDURE — G0103 PSA SCREENING: HCPCS

## 2022-04-11 PROCEDURE — 80048 BASIC METABOLIC PNL TOTAL CA: CPT

## 2022-04-11 PROCEDURE — 36415 COLL VENOUS BLD VENIPUNCTURE: CPT

## 2022-04-11 PROCEDURE — 85025 COMPLETE CBC W/AUTO DIFF WBC: CPT

## 2022-04-11 PROCEDURE — 87086 URINE CULTURE/COLONY COUNT: CPT

## 2022-04-11 RX ORDER — EMPAGLIFLOZIN 10 MG/1
TABLET, FILM COATED ORAL
COMMUNITY
Start: 2022-03-14

## 2022-04-11 ASSESSMENT — ENCOUNTER SYMPTOMS
NAUSEA: 0
COUGH: 0
COLOR CHANGE: 0
SHORTNESS OF BREATH: 0
ABDOMINAL PAIN: 0
WHEEZING: 0
EYE REDNESS: 0
VOMITING: 0
CONSTIPATION: 0
BACK PAIN: 0

## 2022-04-11 NOTE — PROGRESS NOTES
HPI:          Patient is a 61 y.o. male in no acute distress. He is alert and oriented to person, place, and time. History  7/2019 self referral for left flank pain and lower urinary tract symptoms.  He does have MRDD and lives at a group home. Complains of frequency, urgency, and dysuria. He denies gross hematuria. He does admit to constipation, only have a BM 1-2 times per week. He consumes a large amount of bladder irritants. He also has uncontrolled DM. Most recent Hgb A1c was 8.0. He has never seen urology in the past.    Flomax daily                 Discussed bladder irritants thoroughly. Patient instructed to avoid/minimize intake of food/ drinks such as: coffee, tea, caffeine, alcohol, carbonated beverages, soda pop, spicy/acidic foods.                  I also explained the importance of having soft, daily bowel movements to improve his urinary symptoms.  He will take MiraLAX daily. Valdo iDaz was sent home with written instructions on how to take this medication.                 I did explain the importance of improved glycemic control to improve his urinary symptoms as well.  He was encouraged to work toward an A1c of 7 or less. CT showed no stones or hydronephrosis    3/2021 ED   Started revatio    Today  Here today with mom to follow-up for BPH, ED, and stones. He denies any dysuria or gross hematuria. He is having daily bowel movements. He is taking Flomax daily. He denies nocturia or incontinence. He is urinating every hour during the day. He is complaining of left flank pain that has become worse over the last several weeks. He denies fevers, nausea or vomiting. He denies any erections lasting more than 4 hours. He is taking Revatio as needed. He denies any chest pain or shortness of breath    We did send him for labs and a CT scan due to pain.   There is no leukocytosis and renal function is normal.  CT was independently reviewed and does show some very minimal left hydronephrosis. Angela Augusta noted in the left kidney last year is no longer present. There is no evidence of ureteral stones. It is likely that he recently passed a stone. There is significant prostatomegaly with extension into the base of the bladder. Past Medical History:   Diagnosis Date    Hyperlipidemia     Nephrolithiasis 2007    Tobacco abuse     Type 2 diabetes mellitus ([facility] [address]      Past Surgical History:   Procedure Laterality Date    APPENDECTOMY  1978    COLONOSCOPY  3/3/15    -polyps    TONSILLECTOMY  1974     Outpatient Encounter Medications as of 4/11/2022   Medication Sig Dispense Refill    JARDIANCE 10 MG tablet TAKE 1 TABLET BY MOUTH EVERY MORNING      aspirin 81 MG EC tablet Take 1 tablet by mouth daily 90 tablet 3    Insulin Pen Needle (BD PEN NEEDLE ERVIN 2ND GEN) 32G X 4 MM MISC 1 each by Does not apply route daily 100 each 3    atorvastatin (LIPITOR) 80 MG tablet Take 1 tablet by mouth daily 90 tablet 3    pantoprazole (PROTONIX) 40 MG tablet Take 1 tablet by mouth daily 90 tablet 1    tamsulosin (FLOMAX) 0.4 MG capsule Take 1 capsule by mouth daily 90 capsule 3    omeprazole (PRILOSEC) 20 MG delayed release capsule Take 1 capsule by mouth Daily 90 capsule 3    FLUoxetine (PROZAC) 10 MG capsule Take 1 capsule by mouth daily 90 capsule 3    glipiZIDE (GLUCOTROL) 5 MG tablet TAKE 1/2 TABLET BY MOUTH 30 MINUTES BEFORE BREAKFAST      insulin glargine (LANTUS) 100 UNIT/ML injection vial Inject 38 Units into the skin nightly      ACCU-CHEK SMARTVIEW strip USE TO TEST ONCE DAILY.  100 strip 3    Continuous Blood Gluc  (FREESTYLE HELEN 2 READER) LEROY USE AS DIRECTED TO CHECK BLOOD SUGAR 6 TIMES DAILY      sildenafil (REVATIO) 20 MG tablet Take 3 tablets by mouth as needed (erectile dysfunction) 30 tablet 3    metFORMIN (GLUCOPHAGE) 850 MG tablet Take 1 tablet by mouth 2 times daily (with meals) 180 tablet 1    OZEMPIC, 0.25 OR 0.5 MG/DOSE, 2 MG/1.5ML SOPN INJECT 0.25 MG SUBCUTANEOUS WEEKLY FOR 4 WEEKS, THEN INCREASE TO 0.5MG      Multiple Vitamins-Minerals (VISION FORMULA PO) Take by mouth      polyethylene glycol (GLYCOLAX) powder Take 17 g by mouth daily      Lancets MISC 1 each by Does not apply route 2 times daily 100 each 3    linagliptin (TRADJENTA) 5 MG tablet Take 1 tablet by mouth daily (Patient not taking: Reported on 4/11/2022) 90 tablet 1    gabapentin (NEURONTIN) 300 MG capsule Take 1 capsule by mouth daily for 30 days. 60 capsule 5     No facility-administered encounter medications on file as of 4/11/2022. Current Outpatient Medications on File Prior to Visit   Medication Sig Dispense Refill    JARDIANCE 10 MG tablet TAKE 1 TABLET BY MOUTH EVERY MORNING      aspirin 81 MG EC tablet Take 1 tablet by mouth daily 90 tablet 3    Insulin Pen Needle (BD PEN NEEDLE ERVIN 2ND GEN) 32G X 4 MM MISC 1 each by Does not apply route daily 100 each 3    atorvastatin (LIPITOR) 80 MG tablet Take 1 tablet by mouth daily 90 tablet 3    pantoprazole (PROTONIX) 40 MG tablet Take 1 tablet by mouth daily 90 tablet 1    tamsulosin (FLOMAX) 0.4 MG capsule Take 1 capsule by mouth daily 90 capsule 3    omeprazole (PRILOSEC) 20 MG delayed release capsule Take 1 capsule by mouth Daily 90 capsule 3    FLUoxetine (PROZAC) 10 MG capsule Take 1 capsule by mouth daily 90 capsule 3    glipiZIDE (GLUCOTROL) 5 MG tablet TAKE 1/2 TABLET BY MOUTH 30 MINUTES BEFORE BREAKFAST      insulin glargine (LANTUS) 100 UNIT/ML injection vial Inject 38 Units into the skin nightly      ACCU-CHEK SMARTVIEW strip USE TO TEST ONCE DAILY.  100 strip 3    Continuous Blood Gluc  (FREESTYLE HELEN 2 READER) LEROY USE AS DIRECTED TO CHECK BLOOD SUGAR 6 TIMES DAILY      sildenafil (REVATIO) 20 MG tablet Take 3 tablets by mouth as needed (erectile dysfunction) 30 tablet 3    metFORMIN (GLUCOPHAGE) 850 MG tablet Take 1 tablet by mouth 2 times daily (with meals) 180 tablet 1    OZEMPIC, 0.25 OR 0.5 MG/DOSE, 2 MG/1.5ML SOPN INJECT 0.25 MG SUBCUTANEOUS WEEKLY FOR 4 WEEKS, THEN INCREASE TO 0.5MG      Multiple Vitamins-Minerals (VISION FORMULA PO) Take by mouth      polyethylene glycol (GLYCOLAX) powder Take 17 g by mouth daily      Lancets MISC 1 each by Does not apply route 2 times daily 100 each 3    linagliptin (TRADJENTA) 5 MG tablet Take 1 tablet by mouth daily (Patient not taking: Reported on 4/11/2022) 90 tablet 1    gabapentin (NEURONTIN) 300 MG capsule Take 1 capsule by mouth daily for 30 days. 60 capsule 5     No current facility-administered medications on file prior to visit. Patient has no known allergies. Family History   Problem Relation Age of Onset    High Blood Pressure Father      Social History     Tobacco Use   Smoking Status Current Every Day Smoker    Packs/day: 0.50    Years: 16.00    Pack years: 8.00    Types: Cigarettes   Smokeless Tobacco Never Used   Tobacco Comment    Pt states trying to quit       Social History     Substance and Sexual Activity   Alcohol Use Yes       Review of Systems   Constitutional: Negative for appetite change, chills and fever. Eyes: Negative for redness and visual disturbance. Respiratory: Negative for cough, shortness of breath and wheezing. Cardiovascular: Negative for chest pain and leg swelling. Gastrointestinal: Negative for abdominal pain, constipation, nausea and vomiting. Genitourinary: Positive for flank pain and frequency. Negative for decreased urine volume, difficulty urinating, dysuria, enuresis, hematuria, penile discharge, penile pain, scrotal swelling, testicular pain and urgency. Musculoskeletal: Negative for back pain, joint swelling and myalgias. Skin: Negative for color change, rash and wound. Neurological: Negative for dizziness, tremors and numbness. Hematological: Negative for adenopathy. Does not bruise/bleed easily.        /74 (Site: Right Upper Arm, Position: Sitting, Cuff Size: Large Adult) Pulse 82   Wt 193 lb (87.5 kg)   BMI 25.46 kg/m²       PHYSICAL EXAM:  Constitutional: Patient in no acute distress; Neuro: alert and oriented to person place and time. Psych: Mood and affect normal.  Skin: Normal  Lungs: Respiratory effort normal  Cardiovascular:  Normal peripheral pulses  Abdomen: Soft, non-tender, non-distended with no CVA, flank pain  Bladder non-tender and not distended. Lab Results   Component Value Date    BUN 13 06/08/2021     Lab Results   Component Value Date    CREATININE 0.74 06/08/2021     Lab Results   Component Value Date    PSA 1.44 08/21/2019    PSA 1.50 10/08/2018       ASSESSMENT:   Diagnosis Orders   1. Renal colic  CT ABDOMEN PELVIS WO CONTRAST Additional Contrast? None    Culture, Urine    Urinalysis with Microscopic    CBC with Auto Differential    Basic Metabolic Panel   2. Screening PSA (prostate specific antigen)  PSA Screening   3. BPH with obstruction/lower urinary tract symptoms  Culture, Urine    Urinalysis with Microscopic   4.  Renal calculus           PLAN:  Urine culture    We will call with PSA results    Due to frequency despite Flomax and prostatomegaly with protrusion into the bladder wall we will proceed with cystoscopy

## 2022-04-12 LAB
CULTURE: NO GROWTH
ESTIMATED AVERAGE GLUCOSE: 157 MG/DL
HBA1C MFR BLD: 7.1 % (ref 4–6)
SPECIMEN DESCRIPTION: NORMAL

## 2022-04-13 ENCOUNTER — TELEPHONE (OUTPATIENT)
Dept: UROLOGY | Age: 57
End: 2022-04-13

## 2022-04-13 NOTE — TELEPHONE ENCOUNTER
----- Message from AMERICA Thurman - CNP sent at 4/13/2022  8:41 AM EDT -----  Call pt - urine cx reviewed and negative for UTI & for significant microhematuria

## 2022-04-27 ENCOUNTER — HOSPITAL ENCOUNTER (OUTPATIENT)
Age: 57
Setting detail: SPECIMEN
Discharge: HOME | End: 2022-04-27
Payer: MEDICARE

## 2022-04-27 ENCOUNTER — PROCEDURE VISIT (OUTPATIENT)
Dept: UROLOGY | Age: 57
End: 2022-04-27
Payer: MEDICARE

## 2022-04-27 VITALS
WEIGHT: 193 LBS | DIASTOLIC BLOOD PRESSURE: 72 MMHG | HEART RATE: 90 BPM | SYSTOLIC BLOOD PRESSURE: 142 MMHG | HEIGHT: 72 IN | BODY MASS INDEX: 26.14 KG/M2

## 2022-04-27 DIAGNOSIS — N13.8 BPH WITH OBSTRUCTION/LOWER URINARY TRACT SYMPTOMS: Primary | ICD-10-CM

## 2022-04-27 DIAGNOSIS — N13.8 BPH WITH OBSTRUCTION/LOWER URINARY TRACT SYMPTOMS: ICD-10-CM

## 2022-04-27 DIAGNOSIS — N40.1 BPH WITH OBSTRUCTION/LOWER URINARY TRACT SYMPTOMS: Primary | ICD-10-CM

## 2022-04-27 DIAGNOSIS — N52.9 ERECTILE DYSFUNCTION, UNSPECIFIED ERECTILE DYSFUNCTION TYPE: ICD-10-CM

## 2022-04-27 DIAGNOSIS — N40.1 BPH WITH OBSTRUCTION/LOWER URINARY TRACT SYMPTOMS: ICD-10-CM

## 2022-04-27 LAB
-: NORMAL
BILIRUBIN URINE: NEGATIVE
COLOR: YELLOW
EPITHELIAL CELLS UA: NORMAL /HPF (ref 0–5)
GLUCOSE URINE: ABNORMAL
KETONES, URINE: NEGATIVE
LEUKOCYTE ESTERASE, URINE: NEGATIVE
NITRITE, URINE: NEGATIVE
PH UA: 6 (ref 5–9)
PROTEIN UA: NEGATIVE
RBC UA: NORMAL /HPF (ref 0–2)
SPECIFIC GRAVITY UA: 1.02 (ref 1.01–1.02)
TURBIDITY: CLEAR
URINE HGB: NEGATIVE
UROBILINOGEN, URINE: NORMAL
WBC UA: NORMAL /HPF (ref 0–5)

## 2022-04-27 PROCEDURE — 87086 URINE CULTURE/COLONY COUNT: CPT

## 2022-04-27 PROCEDURE — 99214 OFFICE O/P EST MOD 30 MIN: CPT | Performed by: UROLOGY

## 2022-04-27 PROCEDURE — 52000 CYSTOURETHROSCOPY: CPT | Performed by: UROLOGY

## 2022-04-27 PROCEDURE — 81001 URINALYSIS AUTO W/SCOPE: CPT

## 2022-04-27 NOTE — PROGRESS NOTES
HPI:          Patient is a 61 y.o. male in no acute distress. He is alert and oriented to person, place, and time. History  7/2019 self referral for left flank pain and lower urinary tract symptoms.  He does have MRDD and lives at a group home. Complains of frequency, urgency, and dysuria. He denies gross hematuria. He does admit to constipation, only have a BM 1-2 times per week. He consumes a large amount of bladder irritants. He also has uncontrolled DM. Most recent Hgb A1c was 8.0. He has never seen urology in the past.               Flomax daily                            Discussed bladder irritants thoroughly. Patient instructed to avoid/minimize intake of food/ drinks such as: coffee, tea, caffeine, alcohol, carbonated beverages, soda pop, spicy/acidic foods.                  I also explained the importance of having soft, daily bowel movements to improve his urinary symptoms.  He will take MiraLAX daily. [facility] was sent home with written instructions on how to take this medication.                 L did explain the importance of improved glycemic control to improve his urinary symptoms as well.  He was encouraged to work toward an A1c of 7 or less.                 CT showed no stones or hydronephrosis     3/2021 ED              Started revatio    Currently  Patient is here today for lower tract visualization. We are doing this for persistent lower urinary tract symptoms. We also did see a protrusion of the prostate into the bladder. Patient does feel that he is doing better with Flomax. He reports no recent gross hematuria or dysuria. He is still having issues with urinary weak stream.  No pain today.     Cystoscopy Procedure Note    Pre-operative Diagnosis: BPH with lower urinary tract symptoms    Post-operative Diagnosis: Same     Surgeon: Marisol Sanchez    Assistants: None    Anesthesia : Local    Procedure Details   The risks, benefits, complications, treatment options, and expected outcomes were discussed with the patient. The patient concurred with the proposed plan, giving informed consent. Cystoscopy was performed today under local anesthesia, using sterile technique. The patient was placed in the dorsal lithotomy position, prepped with CHG, and draped in the usual sterile fashion. A 14 [place] flexible cystoscope was used to systematically inspect both the urethra and bladder in their entirety. Findings:  Anterior urethra: normal without strictures  Hyperplasia: Trilobar  Bladder: Normal mucosa, without lesions. Ureteral orifice(s) was/were seen in the normal position and effluxing clear urine  Trabeculations No  Diverticulum No  Description: Trilobar hyperplasia, median lobe looks as if it is split. Specimens: Cytology/urine culture No                 Complications:  None; patient tolerated the procedure well.            Disposition: home           Condition: stable      Past Medical History:   Diagnosis Date    Hyperlipidemia     Nephrolithiasis 2007    Tobacco abuse     Type 2 diabetes mellitus ([facility] [address]      Past Surgical History:   Procedure Laterality Date    APPENDECTOMY  1978    COLONOSCOPY  3/3/15    -polyps    TONSILLECTOMY  1974     Outpatient Encounter Medications as of 4/27/2022   Medication Sig Dispense Refill    ACCU-CHEK SMARTVIEW strip USE TO TEST ONCE DAILY 100 strip 3    JARDIANCE 10 MG tablet TAKE 1 TABLET BY MOUTH EVERY MORNING      aspirin 81 MG EC tablet Take 1 tablet by mouth daily 90 tablet 3    Insulin Pen Needle (BD PEN NEEDLE ERVIN 2ND GEN) 32G X 4 MM MISC 1 each by Does not apply route daily 100 each 3    atorvastatin (LIPITOR) 80 MG tablet Take 1 tablet by mouth daily 90 tablet 3    pantoprazole (PROTONIX) 40 MG tablet Take 1 tablet by mouth daily 90 tablet 1    tamsulosin (FLOMAX) 0.4 MG capsule Take 1 capsule by mouth daily 90 capsule 3    omeprazole (PRILOSEC) 20 MG delayed release capsule Take 1 capsule by mouth Daily 90 capsule 3  FLUoxetine (PROZAC) 10 MG capsule Take 1 capsule by mouth daily 90 capsule 3    glipiZIDE (GLUCOTROL) 5 MG tablet TAKE 1/2 TABLET BY MOUTH 30 MINUTES BEFORE BREAKFAST      insulin glargine (LANTUS) 100 UNIT/ML injection vial Inject 38 Units into the skin nightly      Continuous Blood Gluc  (FREESTYLE HELEN 2 READER) LEROY USE AS DIRECTED TO CHECK BLOOD SUGAR 6 TIMES DAILY      sildenafil (REVATIO) 20 MG tablet Take 3 tablets by mouth as needed (erectile dysfunction) 30 tablet 3    metFORMIN (GLUCOPHAGE) 850 MG tablet Take 1 tablet by mouth 2 times daily (with meals) 180 tablet 1    OZEMPIC, 0.25 OR 0.5 MG/DOSE, 2 MG/1.5ML SOPN INJECT 0.25 MG SUBCUTANEOUS WEEKLY FOR 4 WEEKS, THEN INCREASE TO 0.5MG      Multiple Vitamins-Minerals (VISION FORMULA PO) Take by mouth      polyethylene glycol (GLYCOLAX) powder Take 17 g by mouth daily      Lancets MISC 1 each by Does not apply route 2 times daily 100 each 3    gabapentin (NEURONTIN) 300 MG capsule Take 1 capsule by mouth daily for 30 days. 60 capsule 5     No facility-administered encounter medications on file as of 4/27/2022.       Current Outpatient Medications on File Prior to Visit   Medication Sig Dispense Refill    ACCU-CHEK SMARTVIEW strip USE TO TEST ONCE DAILY 100 strip 3    JARDIANCE 10 MG tablet TAKE 1 TABLET BY MOUTH EVERY MORNING      aspirin 81 MG EC tablet Take 1 tablet by mouth daily 90 tablet 3    Insulin Pen Needle (BD PEN NEEDLE ERVIN 2ND GEN) 32G X 4 MM MISC 1 each by Does not apply route daily 100 each 3    atorvastatin (LIPITOR) 80 MG tablet Take 1 tablet by mouth daily 90 tablet 3    pantoprazole (PROTONIX) 40 MG tablet Take 1 tablet by mouth daily 90 tablet 1    tamsulosin (FLOMAX) 0.4 MG capsule Take 1 capsule by mouth daily 90 capsule 3    omeprazole (PRILOSEC) 20 MG delayed release capsule Take 1 capsule by mouth Daily 90 capsule 3    FLUoxetine (PROZAC) 10 MG capsule Take 1 capsule by mouth daily 90 capsule 3    glipiZIDE (GLUCOTROL) 5 MG tablet TAKE 1/2 TABLET BY MOUTH 30 MINUTES BEFORE BREAKFAST      insulin glargine (LANTUS) 100 UNIT/ML injection vial Inject 38 Units into the skin nightly      Continuous Blood Gluc  (FREESTYLE HELEN 2 READER) LEROY USE AS DIRECTED TO CHECK BLOOD SUGAR 6 TIMES DAILY      sildenafil (REVATIO) 20 MG tablet Take 3 tablets by mouth as needed (erectile dysfunction) 30 tablet 3    metFORMIN (GLUCOPHAGE) 850 MG tablet Take 1 tablet by mouth 2 times daily (with meals) 180 tablet 1    OZEMPIC, 0.25 OR 0.5 MG/DOSE, 2 MG/1.5ML SOPN INJECT 0.25 MG SUBCUTANEOUS WEEKLY FOR 4 WEEKS, THEN INCREASE TO 0.5MG      Multiple Vitamins-Minerals (VISION FORMULA PO) Take by mouth      polyethylene glycol (GLYCOLAX) powder Take 17 g by mouth daily      Lancets MISC 1 each by Does not apply route 2 times daily 100 each 3    gabapentin (NEURONTIN) 300 MG capsule Take 1 capsule by mouth daily for 30 days. 60 capsule 5     No current facility-administered medications on file prior to visit. Patient has no known allergies. Family History   Problem Relation Age of Onset    High Blood Pressure Father      Social History     Tobacco Use   Smoking Status Current Every Day Smoker    Packs/day: 0.50    Years: 16.00    Pack years: 8.00    Types: Cigarettes   Smokeless Tobacco Never Used   Tobacco Comment    Pt states trying to quit       Social History     Substance and Sexual Activity   Alcohol Use Yes       Review of Systems   Constitutional: Negative for appetite change, chills and fever. Eyes: Negative for pain, redness and visual disturbance. Respiratory: Negative for cough, shortness of breath and wheezing. Cardiovascular: Negative for chest pain and leg swelling. Gastrointestinal: Negative for abdominal pain, nausea and vomiting. Genitourinary: Positive for difficulty urinating, frequency and urgency.  Negative for dysuria, flank pain, hematuria, penile discharge, scrotal swelling and testicular pain. Musculoskeletal: Negative for back pain, joint swelling and myalgias. Skin: Negative for color change, rash and wound. Neurological: Negative for dizziness, tremors and numbness. Hematological: Negative for adenopathy. Does not bruise/bleed easily. BP (!) 142/72 (Site: Left Upper Arm, Position: Sitting, Cuff Size: Medium Adult)   Pulse 90   Ht 6' (1.829 m)   Wt 193 lb (87.5 kg)   BMI 26.18 kg/m²       PHYSICAL EXAM:  Constitutional: Patient in no acute distress; Neuro: alert and oriented to person place and time. Psych: Mood and affect normal.  Skin: Normal  Lungs: Respiratory effort normal  Cardiovascular:  Normal peripheral pulses  Abdomen: Soft, non-tender, non-distended with no CVA, flank pain  Bladder non-tender and not distended. Lymphatics: no palpable lymphadenopathy  Penis normal  Urethral meatus normal  Scrotal exam normal  Testicles normal bilaterally  Epididymis normal bilaterally  No evidence of inguinal hernia        Lab Results   Component Value Date    BUN 21 (H) 04/11/2022     Lab Results   Component Value Date    CREATININE 0.67 (L) 04/11/2022     Lab Results   Component Value Date    PSA 1.89 04/11/2022    PSA 1.44 08/21/2019    PSA 1.50 10/08/2018       ASSESSMENT:  This is a 61 y.o. male with the following diagnoses:   Diagnosis Orders   1. BPH with obstruction/lower urinary tract symptoms     2. Erectile dysfunction, unspecified erectile dysfunction type         PLAN:  We will schedule patient for PVP greenlight. We will obtain clearance from primary care. Discussed risks and benefits of PVP Greenlight to include, but not limited: risk of anesthesia, bleeding, infection, injury to the  tract, and retrograde ejaculation. This includes major risks such as: bladder perforation or injury to the rectum. We also discussed the need for post-operative rudd catheter. We discussed a healing period of 12 weeks.  This healing period includes irritative voiding symptoms, but he understand that each case is different and may take more or less healing time. Patient is amendable to schedule.

## 2022-04-27 NOTE — PATIENT INSTRUCTIONS
SURVEY:    You may be receiving a survey from [employer] regarding your visit today. Please complete the survey to enable us to provide the highest quality of care to you and your family. If you cannot score us a very good on any question, please call the office to discuss how we could have made your experience a very good one. Thank you.   Louisa

## 2022-04-27 NOTE — PROGRESS NOTES
During cystoscopy the following was utilized on patient with no adverse affects:    45% SODIUM CHLORIDE 500 ML BAG  Lot number: [id number]  Expiration date: May 2023      Courtney 207 2%   Lot number: [id number]  Expiration date: 07/23

## 2022-04-28 ASSESSMENT — ENCOUNTER SYMPTOMS
VOMITING: 0
ABDOMINAL PAIN: 0
EYE PAIN: 0
COLOR CHANGE: 0
BACK PAIN: 0
NAUSEA: 0
EYE REDNESS: 0
WHEEZING: 0
SHORTNESS OF BREATH: 0
COUGH: 0

## 2022-04-28 NOTE — PROGRESS NOTES
Thank you, Dr. Finley, for asking the Abbott Northwestern Hospital Courtney Wooten is a very pleasant 81 Diabetes Self-Management Education Record    Progress Note:  Pt was seen for diabetes education with both parents and . Family history of diabetes includes uncle and father was just diagnosed. Pt has had diabetes for several years and has been seen here in 2016 and 2018. His most recent A1C was 8.o which was obtained on July 6. He takes Metformin, Tradjenta, and Levemir. Pt brought insulin pen to appointment. Pt was able to demonstrate how he attaches needle, dials appropriate dose and verbalizes how he injects. He does not prime the pen and is instructed to add this step. He is given a handout that has pictures and words and includes priming the pen. Pt was able to return demo this after being shown. He uses his abdomen for injection and rotates sites. Reminded of importance of holding pen in place after injection complete and counting to 5-10 and then removing the needle. Instructed on safe needle disposal by using puncture resistant container and taping closed when full and disposing in trash. He verbalizes understanding.  and parents report concern regarding symptoms patient has and questions regarding high versus low blood sugar. Handouts given and reviewed regarding hyper and hypoglycemia. Pt does not carry a fast acting carbohydrate with him which is concerning due to his activity (walks a lot) and goes kayaking and on boat fishing for long periods. Pt was given glucose tablets to carry with him. Pt does eat consistent meals with breakfast around 5am-lunch 12 and supper around 4. He doesn't always include a bedtime snack. Handout given on healthy snack ideas. He is very active with working at  [employer] and he does usually have a snack in am while at work. In the past he had some problems with hypoglycemia while working and usually treated with a candy bar. Discussed better treatment options and plans to carry glucose tablets.   Focused diet education on ZZC  TOTAL ABDOM HYSTERECTOMY  1983     CHRISTUS St. Vincent Physicians Medical 

## 2022-04-29 LAB
CULTURE: NO GROWTH
SPECIMEN DESCRIPTION: NORMAL

## 2022-05-02 ENCOUNTER — TELEPHONE (OUTPATIENT)
Dept: UROLOGY | Age: 57
End: 2022-05-02

## 2022-05-02 NOTE — TELEPHONE ENCOUNTER
----- Message from AMERICA Apodaca - CNP sent at 5/2/2022  8:32 AM EDT -----  Call pt - urine cx reviewed and negative for UTI & for significant microhematuria

## 2022-05-03 NOTE — PROGRESS NOTES
Patient's mother Breonna Bradshaw instructed on the pre-operative, intra-operative, and post-operative process. Patient's mom instructed on pt's NPO status. Medication instructions and pre operative instruction sheet reviewed with the patient and mother on speaker phone. Pt was instructed to stop taking aspirin pre-op per Dr. Yumiko Lunsford office. Instructed pt and mother to hold multivitamin 7 days prior to surgery and to take prozac, gabapentin, protonix, and prilosec with a small sip of water prior to arriving to the hospital the day of surgery.

## 2022-05-09 ENCOUNTER — ANESTHESIA EVENT (OUTPATIENT)
Dept: OPERATING ROOM | Age: 57
End: 2022-05-09
Payer: MEDICARE

## 2022-05-09 NOTE — H&P
History and Physical    Patient:  Reese Ma  MRN: [id number]    CHIEF COMPLAINT: BPH and lower urinary tract symptoms    HISTORY OF PRESENT ILLNESS:   The patient is a 61 y.o. male who presents with BPH and lower urinary tract symptoms. History  7/2019 self referral for left flank pain and lower urinary tract symptoms.  He does have MRDD and lives at a group home.  Complains of frequency, urgency, and dysuria. He denies gross hematuria. He does admit to constipation, only have a BM 1-2 times per week. He consumes a large amount of bladder irritants. He also has uncontrolled DM. Most recent Hgb A1c was 8.0. He has never seen urology in the past.               Flomax daily                            Discussed bladder irritants thoroughly. Patient instructed to avoid/minimize intake of food/ drinks such as: coffee, tea, caffeine, alcohol, carbonated beverages, soda pop, spicy/acidic foods.                  I also explained the importance of having soft, daily bowel movements to improve his urinary symptoms.  He will take MiraLAX daily. Esvin Lester was sent home with written instructions on how to take this medication.                 X did explain the importance of improved glycemic control to improve his urinary symptoms as well.  He was encouraged to work toward an A1c of 7 or less.                 QA showed no stones or hydronephrosis     3/2021 ED              Started revatio       Past Medical History:    Past Medical History:   Diagnosis Date    Hyperlipidemia     Nephrolithiasis 2007    Tobacco abuse     Type 2 diabetes mellitus ([facility] [address]        Past Surgical History:    Past Surgical History:   Procedure Laterality Date    APPENDECTOMY  1978    COLONOSCOPY  3/3/15    -polyps    TONSILLECTOMY  1974       Medicati ons Prior to Admission:    Prior to Admission medications    Medication Sig Start Date End Date Taking?  Authorizing Provider   ACCU-CHEK SMARTVIEW strip USE TO TEST ONCE DAILY 4/18/22   Mayela Walker Joann Carrasco MD   JARDIANCE 10 MG tablet TAKE 1 TABLET BY MOUTH EVERY MORNING 3/14/22   Historical Provider, MD   aspirin 81 MG EC tablet Take 1 tablet by mouth daily 4/4/22   Baron Jonathan MD   Insulin Pen Needle (BD PEN NEEDLE ERVIN 2ND GEN) 32G X 4 MM MISC 1 each by Does not apply route daily 4/1/22   Baron Jonathan MD   atorvastatin (LIPITOR) 80 MG tablet Take 1 tablet by mouth daily 2/28/22   Baron Jonathan MD   pantoprazole (PROTONIX) 40 MG tablet Take 1 tablet by mouth daily 2/28/22   Baron Jonathan MD   tamsulosin [facility]) 0.4 MG capsule Take 1 capsule by mouth daily 2/7/22   Baron Jonathan MD   omeprazole (PRILOSEC) 20 MG delayed release capsule Take 1 capsule by mouth Daily 1/3/22   Baron Jonathan MD   FLUoxetine (PROZAC) 10 MG capsule Take 1 capsule by mouth daily 11/29/21   Baron Jonathan MD   glipiZIDE (GLUCOTROL) 5 MG tablet TAKE 1/2 TABLET BY MOUTH 30 MINUTES BEFORE BREAKFAST 9/9/21   Historical Provider, MD   insulin glargine (LANTUS) 100 UNIT/ML injection vial Inject 38 Units into the skin nightly    Historical Provider, MD   gabapentin (NEURONTIN) 300 MG capsule Take 1 capsule by mouth daily for 30 days.  7/12/21 8/11/21  Baron Jonathan MD   Continuous Blood Gluc  (FREESTYLE HELEN 2 READER) LEROY USE AS DIRECTED TO CHECK BLOOD SUGAR 6 TIMES DAILY 2/8/21   Historical Provider, MD   sildenafil (REVATIO) 20 MG tablet Take 3 tablets by mouth as needed (erectile dysfunction) 3/15/21   Dana Gray MD   metFORMIN (GLUCOPHAGE) 850 MG tablet Take 1 tablet by mouth 2 times daily (with meals) 7/6/20   Baron Jonathan MD   OZEMPIC, 0.25 OR 0.5 MG/DOSE, 2 MG/1.5ML SOPN INJECT 0.25 MG SUBCUTANEOUS WEEKLY FOR 4 WEEKS, THEN INCREASE TO 0.5MG 6/2/20   Historical Provider, MD   Multiple Vitamins-Minerals (VISION FORMULA PO) Take by mouth    Historical Provider, MD   polyethylene glycol (GLYCOLAX) powder Take 17 g by mouth daily    Historical Provider, MD   Lancets MISC 1 each by Does not apply route 2 times daily 5/28/19   Jackeline Beltran MD       Allergies:  Patient has no known allergies. Social History:    Social History     Socioeconomic History    Marital status: Single     Spouse name: Not on file    Number of children: Not on file    Years of education: Not on file    Highest education level: Not on file   Occupational History    Not on file   Tobacco Use    Smoking status: Current Every Day Smoker     Packs/day: 0.50     Years: 16.00     Pack years: 8.00     Types: Cigarettes    Smokeless tobacco: Never Used    Tobacco comment: Pt states trying to quit   Vaping Use    Vaping Use: Never used   Substance and Sexual Activity    Alcohol use: Yes    Drug use: No    Sexual activity: Not on file   Other Topics Concern    Not on file   Social History Narrative    Not on file     Social Determinants of Health     Financial Resource Strain: Low Risk     Difficulty of Paying Living Expenses: Not hard at all   Food Insecurity: No Food Insecurity    Worried About Running Out of Food in the Last Year: Never true    Annika of Food in the Last Year: Never true   Transportation Needs: No Transportation Needs    Lack of Transportation (Medical): No    Lack of Transportation (Non-Medical):  No   Physical Activity:     Days of Exercise per Week: Not on file    Minutes of Exercise per Session: Not on file   Stress:     Feeling of Stress : Not on file   Social Connections:     Frequency of Communication with Friends and Family: Not on file    Frequency of Social Gatherings with Friends and Family: Not on file    Attends Protestant Services: Not on file    Active Member of Clubs or Organizations: Not on file    Attends Club or Organization Meetings: Not on file    Marital Status: Not on file   Intimate Partner Violence:     Fear of Current or Ex-Partner: Not on file    Emotionally Abused: Not on file    Physically Abused: Not on file    Sexually Abused: Not on file Housing Stability:     Unable to Pay for Housing in the Last Year: Not on file    Number of Places Lived in the Last Year: Not on file    Unstable Housing in the Last Year: Not on file       Family History:    Family History   Problem Relation Age of Onset    High Blood Pressure Father        REVIEW OF SYSTEMS:  All systems reviewed and negative except for that already noted in the HPI. Physical Exam:      No data found. Constitutional: Patient in no acute distress; Neuro: alert and oriented to person place and time. Psych: Mood and affect normal.  Skin: Normal  Lungs: Respiratory effort normal  Cardiovascular:  Normal peripheral pulses  Abdomen: Soft, non-tender, non-distended with no CVA, flank pain, hepatosplenomegaly or hernia. Kidneys normal.  Bladder non-tender and not distended. Lymphatics: no palpable lymphadenopathy  Penis normal and circumcised  Urethral meatus normal  Scrotal exam normal  Testicles normal bilaterally  Epididymis normal bilaterally  No evidence of inguinal hernia  Anus and perineum normal  Normal rectal tone with no masses  Prostate soft, non-tender to palpation. No palpable nodules. Estimated 40 grams. Seminal vesicles not palpable. LABS:   No results for input(s): WBC, HGB, HCT, MCV, PLT in the last 72 hours. No results for input(s): NA, K, CL, CO2, PHOS, BUN, CREATININE, CA in the last 72 hours. Lab Results   Component Value Date    PSA 1.89 04/11/2022    PSA 1.44 08/21/2019    PSA 1.50 10/08/2018       Additional Lab/culture results:    Urinalysis: No results for input(s): COLORU, PHUR, LABCAST, WBCUA, RBCUA, MUCUS, TRICHOMONAS, YEAST, BACTERIA, CLARITYU, SPECGRAV, LEUKOCYTESUR, UROBILINOGEN, Gopi Sagrario in the last 72 hours.     Invalid input(s): NITRATE, GLUCOSEUKETONESUAMORPHOUS     -----------------------------------------------------------------  Imaging Results:    Assessment and Plan   Impression:    Patient Active Problem List   Diagnosis    Tobacco abuse    Type 2 diabetes mellitus with diabetic polyneuropathy, without long-term current use of insulin (HCC)    Frequency of urination    BPH with obstruction/lower urinary tract symptoms    Constipation    Family history of prostate cancer    Incomplete bladder emptying    Renal colic on left side    Erectile dysfunction       Plan: JACLYN Nath MD  12:04 PM 5/9/2022

## 2022-05-10 ENCOUNTER — ANESTHESIA (OUTPATIENT)
Dept: OPERATING ROOM | Age: 57
End: 2022-05-10
Payer: MEDICARE

## 2022-05-10 ENCOUNTER — HOSPITAL ENCOUNTER (OUTPATIENT)
Age: 57
Setting detail: OUTPATIENT SURGERY
Discharge: HOME | End: 2022-05-10
Attending: UROLOGY | Admitting: UROLOGY
Payer: MEDICARE

## 2022-05-10 VITALS
OXYGEN SATURATION: 96 % | SYSTOLIC BLOOD PRESSURE: 131 MMHG | RESPIRATION RATE: 16 BRPM | HEART RATE: 67 BPM | TEMPERATURE: 97.5 F | DIASTOLIC BLOOD PRESSURE: 75 MMHG | HEIGHT: 72 IN | BODY MASS INDEX: 25.6 KG/M2 | WEIGHT: 189 LBS

## 2022-05-10 VITALS — OXYGEN SATURATION: 96 % | SYSTOLIC BLOOD PRESSURE: 126 MMHG | DIASTOLIC BLOOD PRESSURE: 89 MMHG

## 2022-05-10 PROCEDURE — 6370000000 HC RX 637 (ALT 250 FOR IP): Performed by: NURSE ANESTHETIST, CERTIFIED REGISTERED

## 2022-05-10 PROCEDURE — 7100000000 HC PACU RECOVERY - FIRST 15 MIN: Performed by: UROLOGY

## 2022-05-10 PROCEDURE — 2720000010 HC SURG SUPPLY STERILE: Performed by: UROLOGY

## 2022-05-10 PROCEDURE — 7100000011 HC PHASE II RECOVERY - ADDTL 15 MIN: Performed by: UROLOGY

## 2022-05-10 PROCEDURE — 2500000003 HC RX 250 WO HCPCS: Performed by: NURSE ANESTHETIST, CERTIFIED REGISTERED

## 2022-05-10 PROCEDURE — 2580000003 HC RX 258: Performed by: NURSE ANESTHETIST, CERTIFIED REGISTERED

## 2022-05-10 PROCEDURE — 3700000000 HC ANESTHESIA ATTENDED CARE: Performed by: UROLOGY

## 2022-05-10 PROCEDURE — 7100000001 HC PACU RECOVERY - ADDTL 15 MIN: Performed by: UROLOGY

## 2022-05-10 PROCEDURE — 6360000002 HC RX W HCPCS: Performed by: NURSE ANESTHETIST, CERTIFIED REGISTERED

## 2022-05-10 PROCEDURE — 6360000002 HC RX W HCPCS: Performed by: UROLOGY

## 2022-05-10 PROCEDURE — 3600000004 HC SURGERY LEVEL 4 BASE: Performed by: UROLOGY

## 2022-05-10 PROCEDURE — 2709999900 HC NON-CHARGEABLE SUPPLY: Performed by: UROLOGY

## 2022-05-10 PROCEDURE — 7100000010 HC PHASE II RECOVERY - FIRST 15 MIN: Performed by: UROLOGY

## 2022-05-10 PROCEDURE — 3700000001 HC ADD 15 MINUTES (ANESTHESIA): Performed by: UROLOGY

## 2022-05-10 PROCEDURE — 3600000014 HC SURGERY LEVEL 4 ADDTL 15MIN: Performed by: UROLOGY

## 2022-05-10 RX ORDER — ACETAMINOPHEN 325 MG/1
650 TABLET ORAL ONCE
Status: COMPLETED | OUTPATIENT
Start: 2022-05-10 | End: 2022-05-10

## 2022-05-10 RX ORDER — SODIUM CHLORIDE, SODIUM LACTATE, POTASSIUM CHLORIDE, CALCIUM CHLORIDE 600; 310; 30; 20 MG/100ML; MG/100ML; MG/100ML; MG/100ML
INJECTION, SOLUTION INTRAVENOUS CONTINUOUS
Status: DISCONTINUED | OUTPATIENT
Start: 2022-05-10 | End: 2022-05-10 | Stop reason: HOSPADM

## 2022-05-10 RX ORDER — DEXAMETHASONE SODIUM PHOSPHATE 4 MG/ML
INJECTION, SOLUTION INTRA-ARTICULAR; INTRALESIONAL; INTRAMUSCULAR; INTRAVENOUS; SOFT TISSUE PRN
Status: DISCONTINUED | OUTPATIENT
Start: 2022-05-10 | End: 2022-05-10 | Stop reason: SDUPTHER

## 2022-05-10 RX ORDER — METOCLOPRAMIDE HYDROCHLORIDE 5 MG/ML
10 INJECTION INTRAMUSCULAR; INTRAVENOUS
Status: CANCELLED | OUTPATIENT
Start: 2022-05-10 | End: 2022-05-10

## 2022-05-10 RX ORDER — CIPROFLOXACIN 2 MG/ML
400 INJECTION, SOLUTION INTRAVENOUS
Status: COMPLETED | OUTPATIENT
Start: 2022-05-10 | End: 2022-05-10

## 2022-05-10 RX ORDER — ONDANSETRON 2 MG/ML
4 INJECTION INTRAMUSCULAR; INTRAVENOUS
Status: CANCELLED | OUTPATIENT
Start: 2022-05-10 | End: 2022-05-10

## 2022-05-10 RX ORDER — FENTANYL CITRATE 50 UG/ML
25 INJECTION, SOLUTION INTRAMUSCULAR; INTRAVENOUS EVERY 5 MIN PRN
Status: CANCELLED | OUTPATIENT
Start: 2022-05-10

## 2022-05-10 RX ORDER — SULFAMETHOXAZOLE AND TRIMETHOPRIM 800; 160 MG/1; MG/1
1 TABLET ORAL 2 TIMES DAILY
Qty: 14 TABLET | Refills: 0 | Status: SHIPPED | OUTPATIENT
Start: 2022-05-10 | End: 2022-05-17

## 2022-05-10 RX ORDER — FENTANYL CITRATE 50 UG/ML
INJECTION, SOLUTION INTRAMUSCULAR; INTRAVENOUS PRN
Status: DISCONTINUED | OUTPATIENT
Start: 2022-05-10 | End: 2022-05-10 | Stop reason: SDUPTHER

## 2022-05-10 RX ORDER — DIMENHYDRINATE 50 MG/1
50 TABLET ORAL ONCE
Status: COMPLETED | OUTPATIENT
Start: 2022-05-10 | End: 2022-05-10

## 2022-05-10 RX ORDER — HYDROCODONE BITARTRATE AND ACETAMINOPHEN 5; 325 MG/1; MG/1
2 TABLET ORAL
Status: CANCELLED | OUTPATIENT
Start: 2022-05-10 | End: 2022-05-10

## 2022-05-10 RX ORDER — SODIUM CHLORIDE 0.9 % (FLUSH) 0.9 %
5-40 SYRINGE (ML) INJECTION PRN
Status: CANCELLED | OUTPATIENT
Start: 2022-05-10

## 2022-05-10 RX ORDER — NEOSTIGMINE METHYLSULFATE 1 MG/ML
INJECTION, SOLUTION INTRAVENOUS PRN
Status: DISCONTINUED | OUTPATIENT
Start: 2022-05-10 | End: 2022-05-10 | Stop reason: SDUPTHER

## 2022-05-10 RX ORDER — KETOROLAC TROMETHAMINE 30 MG/ML
INJECTION, SOLUTION INTRAMUSCULAR; INTRAVENOUS PRN
Status: DISCONTINUED | OUTPATIENT
Start: 2022-05-10 | End: 2022-05-10 | Stop reason: SDUPTHER

## 2022-05-10 RX ORDER — LIDOCAINE HYDROCHLORIDE 20 MG/ML
INJECTION, SOLUTION EPIDURAL; INFILTRATION; INTRACAUDAL; PERINEURAL PRN
Status: DISCONTINUED | OUTPATIENT
Start: 2022-05-10 | End: 2022-05-10 | Stop reason: SDUPTHER

## 2022-05-10 RX ORDER — LIDOCAINE HYDROCHLORIDE 20 MG/ML
JELLY TOPICAL PRN
Status: DISCONTINUED | OUTPATIENT
Start: 2022-05-10 | End: 2022-05-10 | Stop reason: ALTCHOICE

## 2022-05-10 RX ORDER — SODIUM CHLORIDE 0.9 % (FLUSH) 0.9 %
5-40 SYRINGE (ML) INJECTION EVERY 12 HOURS SCHEDULED
Status: CANCELLED | OUTPATIENT
Start: 2022-05-10

## 2022-05-10 RX ORDER — SODIUM CHLORIDE 9 MG/ML
INJECTION, SOLUTION INTRAVENOUS PRN
Status: CANCELLED | OUTPATIENT
Start: 2022-05-10

## 2022-05-10 RX ORDER — ONDANSETRON 2 MG/ML
INJECTION INTRAMUSCULAR; INTRAVENOUS PRN
Status: DISCONTINUED | OUTPATIENT
Start: 2022-05-10 | End: 2022-05-10 | Stop reason: SDUPTHER

## 2022-05-10 RX ORDER — HYDROCODONE BITARTRATE AND ACETAMINOPHEN 5; 325 MG/1; MG/1
1 TABLET ORAL
Status: CANCELLED | OUTPATIENT
Start: 2022-05-10 | End: 2022-05-10

## 2022-05-10 RX ORDER — FENTANYL CITRATE 50 UG/ML
50 INJECTION, SOLUTION INTRAMUSCULAR; INTRAVENOUS EVERY 5 MIN PRN
Status: CANCELLED | OUTPATIENT
Start: 2022-05-10

## 2022-05-10 RX ORDER — ROCURONIUM BROMIDE 10 MG/ML
INJECTION, SOLUTION INTRAVENOUS PRN
Status: DISCONTINUED | OUTPATIENT
Start: 2022-05-10 | End: 2022-05-10 | Stop reason: SDUPTHER

## 2022-05-10 RX ORDER — PROPOFOL 10 MG/ML
INJECTION, EMULSION INTRAVENOUS PRN
Status: DISCONTINUED | OUTPATIENT
Start: 2022-05-10 | End: 2022-05-10 | Stop reason: SDUPTHER

## 2022-05-10 RX ORDER — GLYCOPYRROLATE 0.2 MG/ML
INJECTION INTRAMUSCULAR; INTRAVENOUS PRN
Status: DISCONTINUED | OUTPATIENT
Start: 2022-05-10 | End: 2022-05-10 | Stop reason: SDUPTHER

## 2022-05-10 RX ADMIN — Medication 3 MG: at 11:24

## 2022-05-10 RX ADMIN — PROPOFOL 50 MG: 10 INJECTION, EMULSION INTRAVENOUS at 11:13

## 2022-05-10 RX ADMIN — ROCURONIUM BROMIDE 35 MG: 10 SOLUTION INTRAVENOUS at 10:27

## 2022-05-10 RX ADMIN — KETOROLAC TROMETHAMINE 30 MG: 30 INJECTION, SOLUTION INTRAMUSCULAR at 11:27

## 2022-05-10 RX ADMIN — FENTANYL CITRATE 50 MCG: 50 INJECTION INTRAMUSCULAR; INTRAVENOUS at 11:13

## 2022-05-10 RX ADMIN — ONDANSETRON 4 MG: 2 INJECTION INTRAMUSCULAR; INTRAVENOUS at 11:24

## 2022-05-10 RX ADMIN — GLYCOPYRROLATE 0.6 MG: 0.2 INJECTION INTRAMUSCULAR; INTRAVENOUS at 11:24

## 2022-05-10 RX ADMIN — DIMENHYDRINATE 50 MG: 50 TABLET ORAL at 08:57

## 2022-05-10 RX ADMIN — LIDOCAINE HYDROCHLORIDE 80 MG: 20 INJECTION, SOLUTION EPIDURAL; INFILTRATION; INTRACAUDAL; PERINEURAL at 10:27

## 2022-05-10 RX ADMIN — CIPROFLOXACIN 400 MG: 2 INJECTION, SOLUTION INTRAVENOUS at 10:21

## 2022-05-10 RX ADMIN — ACETAMINOPHEN 650 MG: 325 TABLET ORAL at 08:57

## 2022-05-10 RX ADMIN — FENTANYL CITRATE 50 MCG: 50 INJECTION INTRAMUSCULAR; INTRAVENOUS at 10:27

## 2022-05-10 RX ADMIN — SODIUM CHLORIDE, POTASSIUM CHLORIDE, SODIUM LACTATE AND CALCIUM CHLORIDE: 600; 310; 30; 20 INJECTION, SOLUTION INTRAVENOUS at 08:56

## 2022-05-10 RX ADMIN — DEXAMETHASONE SODIUM PHOSPHATE 4 MG: 4 INJECTION, SOLUTION INTRAMUSCULAR; INTRAVENOUS at 10:27

## 2022-05-10 RX ADMIN — PROPOFOL 150 MG: 10 INJECTION, EMULSION INTRAVENOUS at 10:27

## 2022-05-10 ASSESSMENT — LIFESTYLE VARIABLES: SMOKING_STATUS: 1

## 2022-05-10 ASSESSMENT — PAIN - FUNCTIONAL ASSESSMENT: PAIN_FUNCTIONAL_ASSESSMENT: 0-10

## 2022-05-10 NOTE — ANESTHESIA POSTPROCEDURE EVALUATION
Department of Anesthesiology  Postprocedure Note    Patient: Volodymyr Riddle  MRN: [id number]  YOB: 1965  Date of evaluation: 5/10/2022  Time:  1:56 PM     Procedure Summary     Date: 05/10/22 Room / Location: [facility]    Anesthesia Start: 3727 Anesthesia Stop: 5891    Procedure: CYSTOSCOPY TRANSURETHRAL RESECTION PROSTATE LASER-PVP GREENLIGHT (N/A Penis) Diagnosis: (BPH)    Surgeons: Yaneli Coronel MD Responsible Provider: AMERICA Valentin CRNA    Anesthesia Type: general ASA Status: 3          Anesthesia Type: No value filed. Analia Phase I: Analia Score: 10    Analia Phase II: Analia Score: 10    Last vitals: Reviewed and per EMR flowsheets.        Anesthesia Post Evaluation    Patient location during evaluation: PACU  Patient participation: complete - patient participated  Level of consciousness: awake and alert  Airway patency: patent  Nausea & Vomiting: no nausea and no vomiting  Complications: no  Cardiovascular status: blood pressure returned to baseline and hemodynamically stable  Respiratory status: acceptable and room air  Hydration status: euvolemic

## 2022-05-10 NOTE — BRIEF OP NOTE
Brief Postoperative Note      Patient: Tita Clemente  YOB: 1965  MRN: [id number]    Date of Procedure: 5/10/2022    Pre-Op Diagnosis: BPH    Post-Op Diagnosis: Same       Procedure(s):  CYSTOSCOPY TRANSURETHRAL RESECTION PROSTATE LASER-PVP GREENLIGHT    Surgeon(s):  Makenzie Addison MD    Assistant:  * No surgical staff found *    Anesthesia: General    Estimated Blood Loss (mL): Minimal    Complications: None    Specimens:   * No specimens in log *    Implants:  * No implants in log *      Drains:   Urinary Catheter 3 Way (Active)   Catheter Indications Perioperative use for selected surgical procedures 05/10/22 1150   Site Assessment Pink; No urethral drainage 05/10/22 1150   Urine Color Yellow 05/10/22 1150   Urine Appearance Clear 05/10/22 1150       Findings: trilobar hyperplasia    Electronically signed by Makenzie Addison MD on 5/10/2022 at 11:56 AM

## 2022-05-10 NOTE — ANESTHESIA PRE PROCEDURE
Department of Anesthesiology  Preprocedure Note       Name:  Starlyn Lesches   Age:  61 y.o.  :  1965                                          MRN:  [id number]         Date:  5/10/2022      Surgeon: Heriberto Whyte):  Cailin El MD    Procedure: Procedure(s):  CYSTOSCOPY TRANSURETHRAL RESECTION PROSTATE LASER-PVP GREENLIGHT    Medications prior to admission:   Prior to Admission medications    Medication Sig Start Date End Date Taking? Authorizing Provider   ACCU-CHEK SMARTVIEW strip USE TO TEST ONCE DAILY 22   Radha Mixon MD   JARDIANCE 10 MG tablet TAKE 1 TABLET BY MOUTH EVERY MORNING 3/14/22   Historical Provider, MD   aspirin 81 MG EC tablet Take 1 tablet by mouth daily 22   Radha Mixon MD   Insulin Pen Needle (BD PEN NEEDLE ERVIN 2ND GEN) 32G X 4 MM MISC 1 each by Does not apply route daily 22   Radha Mixon MD   atorvastatin (LIPITOR) 80 MG tablet Take 1 tablet by mouth daily 22   Radha Mixon MD   pantoprazole (PROTONIX) 40 MG tablet Take 1 tablet by mouth daily 22   Radha Mixon MD   tamsulosin [facility]) 0.4 MG capsule Take 1 capsule by mouth daily 22   Radha Mixon MD   omeprazole (PRILOSEC) 20 MG delayed release capsule Take 1 capsule by mouth Daily 1/3/22   Radha Mixon MD   FLUoxetine (PROZAC) 10 MG capsule Take 1 capsule by mouth daily 21   Radha Mixon MD   glipiZIDE (GLUCOTROL) 5 MG tablet TAKE 1/2 TABLET BY MOUTH 30 MINUTES BEFORE BREAKFAST 21   Historical Provider, MD   insulin glargine (LANTUS) 100 UNIT/ML injection vial Inject 38 Units into the skin nightly    Historical Provider, MD   gabapentin (NEURONTIN) 300 MG capsule Take 1 capsule by mouth daily for 30 days.  21  Radha Mixon MD   Continuous Blood Gluc  (FREESTYLE HELEN 2 READER) LEROY USE AS DIRECTED TO CHECK BLOOD SUGAR 6 TIMES DAILY 21   Historical Provider, MD   sildenafil (REVATIO) 20 MG tablet Take 3 tablets by mouth as needed (erectile dysfunction) 3/15/21   Desiree Smith MD   metFORMIN (GLUCOPHAGE) 850 MG tablet Take 1 tablet by mouth 2 times daily (with meals) 7/6/20   Joanne Gamboa MD   OZEMPIC, 0.25 OR 0.5 MG/DOSE, 2 MG/1.5ML SOPN INJECT 0.25 MG SUBCUTANEOUS WEEKLY FOR 4 WEEKS, THEN INCREASE TO 0.5MG 6/2/20   Historical Provider, MD   Multiple Vitamins-Minerals (VISION FORMULA PO) Take by mouth  Patient not taking: Reported on 5/10/2022    Historical Provider, MD   polyethylene glycol (GLYCOLAX) powder Take 17 g by mouth daily    Historical Provider, MD   Lancets MISC 1 each by Does not apply route 2 times daily 5/28/19   Joanne Gamboa MD       Current medications:    Current Facility-Administered Medications   Medication Dose Route Frequency Provider Last Rate Last Admin    lactated ringers infusion   IntraVENous Continuous Desiree Smith MD        ciprofloxacin (CIPRO) IVPB 400 mg  400 mg IntraVENous On Call to Barb Poole MD        lactated ringers infusion   IntraVENous Continuous Sue Schwartzble - CRNA 100 mL/hr at 05/10/22 0856 New Bag at 05/10/22 0856       Allergies:  No Known Allergies    Problem List:    Patient Active Problem List   Diagnosis Code    Tobacco abuse Z72.0    Type 2 diabetes mellitus with diabetic polyneuropathy, without long-term current use of insulin ([facility] [address] E11.42    Frequency of urination R35.0    BPH with obstruction/lower urinary tract symptoms N40.1, N13.8    Constipation K59.00    Family history of prostate cancer Z80.42    Incomplete bladder emptying H84.3    Renal colic on left side V15    Erectile dysfunction N52.9       Past Medical History:        Diagnosis Date    Hyperlipidemia     Nephrolithiasis 2007    Tobacco abuse     Type 2 diabetes mellitus ([address]        Past Surgical History:        Procedure Laterality Date    APPENDECTOMY  1978    COLONOSCOPY  3/3/15    -polyps    TONSILLECTOMY  1974       Social History:    Social History     Tobacco Use    Smoking status: Current Every Day Smoker     Packs/day: 0.50     Years: 16.00     Pack years: 8.00     Types: Cigarettes    Smokeless tobacco: Never Used    Tobacco comment: Pt states trying to quit   Substance Use Topics    Alcohol use: Yes     Comment: socially                                Ready to quit: Not Answered  Counseling given: Not Answered  Comment: Pt states trying to quit      Vital Signs (Current):   Vitals:    05/03/22 1052 05/10/22 0849   BP:  (!) 136/97   Pulse:  73   Resp:  18   Temp:  36.4 °C (97.6 °F)   TempSrc:  Temporal   SpO2:  95%   Weight: 193 lb (87.5 kg) 189 lb (85.7 kg)   Height: 6' (1.829 m) 6' (1.829 m)                                              BP Readings from Last 3 Encounters:   05/10/22 (!) 136/97   04/27/22 (!) 142/72   04/11/22 114/74       NPO Status: Time of last liquid consumption: 2130                        Time of last solid consumption: 2000                        Date of last liquid consumption: 05/09/22                        Date of last solid food consumption: 05/09/22    BMI:   Wt Readings from Last 3 Encounters:   05/10/22 189 lb (85.7 kg)   04/27/22 193 lb (87.5 kg)   04/11/22 193 lb (87.5 kg)     Body mass index is 25.63 kg/m².     CBC:   Lab Results   Component Value Date    WBC 10.5 04/11/2022    RBC 4.90 04/11/2022    RBC 2 07/06/2019    HGB 16.3 04/11/2022    HCT 49.2 04/11/2022    .4 04/11/2022    RDW 13.6 04/11/2022     04/11/2022       CMP:   Lab Results   Component Value Date     04/11/2022    K 4.2 04/11/2022     04/11/2022    CO2 26 04/11/2022    BUN 21 04/11/2022    CREATININE 0.67 04/11/2022    GFRAA >60 04/11/2022    LABGLOM >60 04/11/2022    GLUCOSE 93 04/11/2022    GLUCOSE 145 07/06/2019    PROT 6.5 06/08/2021    CALCIUM 9.1 04/11/2022    BILITOT 0.34 06/08/2021    BILITOT Negative 07/06/2019    ALKPHOS 94 06/08/2021    AST 17 06/08/2021    ALT 18 06/08/2021       POC Tests: No results for input(s): POCGLU, POCNA, POCK, POCCL, POCBUN, POCHEMO, POCHCT in the last 72 hours. Coags: No results found for: PROTIME, INR, APTT    HCG (If Applicable): No results found for: PREGTESTUR, PREGSERUM, HCG, HCGQUANT     ABGs: No results found for: PHART, PO2ART, IJI6TIP, YAQ3LMN, BEART, Z1YEEOZO     Type & Screen (If Applicable):  No results found for: LABABO, LABRH    Drug/Infectious Status (If Applicable):  Lab Results   Component Value Date    HEPCAB NONREACTIVE 09/25/2017       COVID-19 Screening (If Applicable): No results found for: COVID19        Anesthesia Evaluation  Patient summary reviewed and Nursing notes reviewed no history of anesthetic complications:   Airway: Mallampati: II  TM distance: >3 FB   Neck ROM: full  Mouth opening: > = 3 FB Dental:    (+) upper dentures  Comment: Lower partial    Pulmonary:normal exam    (+) current smoker          Patient did not smoke on day of surgery. Cardiovascular:  Exercise tolerance: good (>4 METS),   (+) hyperlipidemia      ECG reviewed                        Neuro/Psych:   Negative Neuro/Psych ROS              GI/Hepatic/Renal:   (+) GERD (denies today): well controlled,           Endo/Other:    (+) DiabetesType I DM, poorly controlled, using insulin, . Abdominal:             Vascular: negative vascular ROS. Other Findings:             Anesthesia Plan      general     ASA 3       Induction: intravenous. MIPS: Postoperative opioids intended and Prophylactic antiemetics administered. Anesthetic plan and risks discussed with patient and mother. Plan discussed with CRNA.                   AMERICA Mckeon - CRNA   5/10/2022

## 2022-05-10 NOTE — PROGRESS NOTES
Pt verbalized readiness to go home. Discharge instructions given to pt and parents. Verbalized understanding and all questions answered at this time. Discharge Criteria    Inpatients must meet Criteria 1 through 7. All other patients are either YES or N/A. If a NO is chosen then Anesthesia or Surgeon must be notified. 1.  Minimum 30 minutes after last dose of sedative medication, minimum 120 minutes after last dose of reversal agent. Yes      2. Systolic BP stable within 20 mmHg for 30 minutes & systolic BP between 90 & 357 or within 10 mmHg of baseline. Yes      3. Pulse between 60 and 100 or within 10 bpm of baseline. Yes      4. Spontaneous respiratory rate >/= 10 per minute. Yes      5. SaO2 >/= 95 or  >/= baseline. Yes      6. Able to cough and swallow or return to baseline function. Yes      7. Alert and oriented or return to baseline mental status. Yes      8. Demonstrates controlled, coordinated movements, ambulates with steady gait, or return to baseline activity function. Yes      9. Minimal or no pain or nausea, or at a level tolerable and acceptable to patient. Yes      10. Takes and retains oral fluids as allowed. Yes      11. Procedural / perioperative site stable. Minimal or no bleeding. Yes          12. If GI endoscopy procedure, minimal or no abdominal distention or passing flatus. N/A      13. Written discharge instructions and emergency telephone number provided. Yes      14. Accompanied by a responsible adult.     Yes

## 2022-05-12 ENCOUNTER — OFFICE VISIT (OUTPATIENT)
Dept: UROLOGY | Age: 57
End: 2022-05-12

## 2022-05-12 ENCOUNTER — TELEPHONE (OUTPATIENT)
Dept: UROLOGY | Age: 57
End: 2022-05-12

## 2022-05-12 VITALS — WEIGHT: 193 LBS | HEIGHT: 72 IN | TEMPERATURE: 97.8 F | BODY MASS INDEX: 26.14 KG/M2

## 2022-05-12 DIAGNOSIS — N13.8 BPH WITH OBSTRUCTION/LOWER URINARY TRACT SYMPTOMS: Primary | ICD-10-CM

## 2022-05-12 DIAGNOSIS — N40.1 BPH WITH OBSTRUCTION/LOWER URINARY TRACT SYMPTOMS: Primary | ICD-10-CM

## 2022-05-12 PROCEDURE — 99999 PR OFFICE/OUTPT VISIT,PROCEDURE ONLY: CPT | Performed by: UROLOGY

## 2022-05-12 NOTE — TELEPHONE ENCOUNTER
Patient had catheter pulled this morning. He is doing good, urinating okay. I told him to call if he has trouble. I told him if he can't  urinate after hours, he would need to go to the ER.

## 2022-05-12 NOTE — PROGRESS NOTES
Pt had rudd catheter placed on 5/10/2022 for PVP GreenLight. Balloon deflated on catheter and catheter removed. Patient tolerated procedure well. Pt instructed to drink plenty of fluids, try to urinate q 2 hrs, call office in 6 hrs with update of amount voided, and if not voiding will need to return to office to have rudd replaced. Also instructed to return to office or ER if goes >6 hrs without voiding or has strong urge to void/suprapubic discomfort and cannot urinate. Pt verbalizes understanding of plan. F/u 2 weeks.

## 2022-05-26 ENCOUNTER — OFFICE VISIT (OUTPATIENT)
Dept: UROLOGY | Age: 57
End: 2022-05-26
Payer: MEDICARE

## 2022-05-26 ENCOUNTER — HOSPITAL ENCOUNTER (OUTPATIENT)
Age: 57
Setting detail: SPECIMEN
Discharge: HOME | End: 2022-05-26
Payer: MEDICARE

## 2022-05-26 VITALS
WEIGHT: 192 LBS | BODY MASS INDEX: 26.01 KG/M2 | DIASTOLIC BLOOD PRESSURE: 72 MMHG | HEIGHT: 72 IN | HEART RATE: 81 BPM | SYSTOLIC BLOOD PRESSURE: 132 MMHG

## 2022-05-26 DIAGNOSIS — Z98.890 POST-OPERATIVE STATE: ICD-10-CM

## 2022-05-26 DIAGNOSIS — K59.00 CONSTIPATION, UNSPECIFIED CONSTIPATION TYPE: ICD-10-CM

## 2022-05-26 DIAGNOSIS — N13.8 BPH WITH OBSTRUCTION/LOWER URINARY TRACT SYMPTOMS: ICD-10-CM

## 2022-05-26 DIAGNOSIS — N40.1 BPH WITH OBSTRUCTION/LOWER URINARY TRACT SYMPTOMS: ICD-10-CM

## 2022-05-26 DIAGNOSIS — N40.1 BPH WITH OBSTRUCTION/LOWER URINARY TRACT SYMPTOMS: Primary | ICD-10-CM

## 2022-05-26 DIAGNOSIS — N13.8 BPH WITH OBSTRUCTION/LOWER URINARY TRACT SYMPTOMS: Primary | ICD-10-CM

## 2022-05-26 PROCEDURE — 87086 URINE CULTURE/COLONY COUNT: CPT

## 2022-05-26 PROCEDURE — 99024 POSTOP FOLLOW-UP VISIT: CPT | Performed by: NURSE PRACTITIONER

## 2022-05-26 PROCEDURE — 51798 US URINE CAPACITY MEASURE: CPT | Performed by: NURSE PRACTITIONER

## 2022-05-26 NOTE — PROGRESS NOTES
History  7/2019 self referral for left flank pain and lower urinary tract symptoms.  He does have MRDD and lives at a group home. Complains of frequency, urgency, and dysuria. He denies gross hematuria. He does admit to constipation, only have a BM 1-2 times per week. He consumes a large amount of bladder irritants. He also has uncontrolled DM. Most recent Hgb A1c was 8.0. He has never seen urology in the past.               Flomax daily                            Discussed bladder irritants thoroughly. Patient instructed to avoid/minimize intake of food/ drinks such as: coffee, tea, caffeine, alcohol, carbonated beverages, soda pop, spicy/acidic foods.                  I also explained the importance of having soft, daily bowel movements to improve his urinary symptoms.  He will take MiraLAX daily. Jacob Quezada was sent home with written instructions on how to take this medication.                 I did explain the importance of improved glycemic control to improve his urinary symptoms as well.  He was encouraged to work toward an A1c of 7 or less.                 CT showed no stones or hydronephrosis     3/2021 ED              Started revatio    5/2022 PVP greenlight    Today  Here today s/p PVP greenlight on 5/10/2022. Is not having any daily bowel movement, but he is not taking MiraLAX daily as instructed. He has intermittent dysuria. He denies any fevers.     Plan  Urine culture    Continue Flomax    Stressed the importance of daily MiraLAX    Follow-up in 4 weeks or sooner if needed

## 2022-05-27 LAB
CULTURE: NO GROWTH
SPECIMEN DESCRIPTION: NORMAL

## 2022-05-31 ENCOUNTER — TELEPHONE (OUTPATIENT)
Dept: UROLOGY | Age: 57
End: 2022-05-31

## 2022-05-31 NOTE — TELEPHONE ENCOUNTER
----- Message from AMERICA Field - CNP sent at 5/31/2022  8:59 AM EDT -----  Call pt - urine cx reviewed and negative for UTI

## 2022-06-23 ENCOUNTER — OFFICE VISIT (OUTPATIENT)
Dept: UROLOGY | Age: 57
End: 2022-06-23
Payer: MEDICARE

## 2022-06-23 ENCOUNTER — HOSPITAL ENCOUNTER (OUTPATIENT)
Age: 57
Setting detail: SPECIMEN
Discharge: HOME | End: 2022-06-23
Payer: MEDICARE

## 2022-06-23 VITALS
SYSTOLIC BLOOD PRESSURE: 138 MMHG | HEIGHT: 72 IN | DIASTOLIC BLOOD PRESSURE: 80 MMHG | BODY MASS INDEX: 26.28 KG/M2 | WEIGHT: 194 LBS | TEMPERATURE: 98.9 F

## 2022-06-23 DIAGNOSIS — N13.8 BPH WITH OBSTRUCTION/LOWER URINARY TRACT SYMPTOMS: Primary | ICD-10-CM

## 2022-06-23 DIAGNOSIS — N40.1 BPH WITH OBSTRUCTION/LOWER URINARY TRACT SYMPTOMS: Primary | ICD-10-CM

## 2022-06-23 DIAGNOSIS — Z98.890 POST-OPERATIVE STATE: ICD-10-CM

## 2022-06-23 DIAGNOSIS — N13.8 BPH WITH OBSTRUCTION/LOWER URINARY TRACT SYMPTOMS: ICD-10-CM

## 2022-06-23 DIAGNOSIS — N40.1 BPH WITH OBSTRUCTION/LOWER URINARY TRACT SYMPTOMS: ICD-10-CM

## 2022-06-23 PROCEDURE — 51798 US URINE CAPACITY MEASURE: CPT | Performed by: NURSE PRACTITIONER

## 2022-06-23 PROCEDURE — 87086 URINE CULTURE/COLONY COUNT: CPT

## 2022-06-23 PROCEDURE — 99024 POSTOP FOLLOW-UP VISIT: CPT | Performed by: NURSE PRACTITIONER

## 2022-06-23 NOTE — PATIENT INSTRUCTIONS
Decrease coffee to 2 cups per day    Continue miralax        SURVEY:    You may be receiving a survey from [employer] regarding your visit today. Please complete the survey to enable us to provide the highest quality of care to you and your family. If you cannot score us a very good on any question, please call the office to discuss how we could of made your experience a very good one. Thank you.

## 2022-06-23 NOTE — PROGRESS NOTES
History  7/2019 self referral for left flank pain and lower urinary tract symptoms.  He does have MRDD and lives at a group home. Complains of frequency, urgency, and dysuria. He denies gross hematuria. He does admit to constipation, only have a BM 1-2 times per week. He consumes a large amount of bladder irritants. He also has uncontrolled DM. Most recent Hgb A1c was 8.0. He has never seen urology in the past.               Flomax daily                            Discussed bladder irritants thoroughly. Patient instructed to avoid/minimize intake of food/ drinks such as: coffee, tea, caffeine, alcohol, carbonated beverages, soda pop, spicy/acidic foods.                  I also explained the importance of having soft, daily bowel movements to improve his urinary symptoms.  He will take MiraLAX daily. [facility] was sent home with written instructions on how to take this medication.                 M did explain the importance of improved glycemic control to improve his urinary symptoms as well.  He was encouraged to work toward an A1c of 7 or less.                 CT showed no stones or hydronephrosis     3/2021 ED              Started revatio    5/2022 PVP greenlight    Today  Here today s/p PVP greenlight on 5/10/2022. He is having a daily bowel movement with daily MiraLAX. He denies nocturia, incontinence, dysuria or gross hematuria. He is urinating every 30 minutes during the day. PVR is 97 mL.     Plan  Urine culture    Stop Flomax    Continue daily MiraLAX    Follow-up in 4 weeks or sooner if needed

## 2022-06-24 ENCOUNTER — TELEPHONE (OUTPATIENT)
Dept: UROLOGY | Age: 57
End: 2022-06-24

## 2022-06-24 LAB
CULTURE: NO GROWTH
SPECIMEN DESCRIPTION: NORMAL

## 2022-06-24 NOTE — TELEPHONE ENCOUNTER
----- Message from AMERICA Burkett - CNP sent at 6/24/2022 11:41 AM EDT -----  Call pt - urine cx reviewed and negative for UTI

## 2022-08-04 ENCOUNTER — OFFICE VISIT (OUTPATIENT)
Dept: UROLOGY | Age: 57
End: 2022-08-04
Payer: MEDICARE

## 2022-08-04 ENCOUNTER — HOSPITAL ENCOUNTER (OUTPATIENT)
Age: 57
Setting detail: SPECIMEN
Discharge: HOME | End: 2022-08-04
Payer: MEDICARE

## 2022-08-04 VITALS
WEIGHT: 190 LBS | TEMPERATURE: 98.9 F | SYSTOLIC BLOOD PRESSURE: 121 MMHG | BODY MASS INDEX: 25.77 KG/M2 | HEART RATE: 80 BPM | DIASTOLIC BLOOD PRESSURE: 74 MMHG

## 2022-08-04 DIAGNOSIS — N13.8 BPH WITH OBSTRUCTION/LOWER URINARY TRACT SYMPTOMS: ICD-10-CM

## 2022-08-04 DIAGNOSIS — Z98.890 POST-OPERATIVE STATE: ICD-10-CM

## 2022-08-04 DIAGNOSIS — N13.8 BPH WITH OBSTRUCTION/LOWER URINARY TRACT SYMPTOMS: Primary | ICD-10-CM

## 2022-08-04 DIAGNOSIS — N40.1 BPH WITH OBSTRUCTION/LOWER URINARY TRACT SYMPTOMS: Primary | ICD-10-CM

## 2022-08-04 DIAGNOSIS — N40.1 BPH WITH OBSTRUCTION/LOWER URINARY TRACT SYMPTOMS: ICD-10-CM

## 2022-08-04 PROCEDURE — 51798 US URINE CAPACITY MEASURE: CPT | Performed by: NURSE PRACTITIONER

## 2022-08-04 PROCEDURE — 99024 POSTOP FOLLOW-UP VISIT: CPT | Performed by: NURSE PRACTITIONER

## 2022-08-04 PROCEDURE — 87086 URINE CULTURE/COLONY COUNT: CPT

## 2022-08-04 NOTE — PROGRESS NOTES
History  7/2019 self referral for left flank pain and lower urinary tract symptoms. He does have MRDD and lives at a group home. Complains of frequency, urgency, and dysuria. He denies gross hematuria. He does admit to constipation, only have a BM 1-2 times per week. He consumes a large amount of bladder irritants. He also has uncontrolled DM. Most recent Hgb A1c was 8.0. He has never seen urology in the past.               Flomax daily                            Discussed bladder irritants thoroughly. Patient instructed to avoid/minimize intake of food/ drinks such as: coffee, tea, caffeine, alcohol, carbonated beverages, soda pop, spicy/acidic foods. I also explained the importance of having soft, daily bowel movements to improve his urinary symptoms. He will take MiraLAX daily. He was sent home with written instructions on how to take this medication. I did explain the importance of improved glycemic control to improve his urinary symptoms as well. He was encouraged to work toward an A1c of 7 or less. CT showed no stones or hydronephrosis     3/2021 ED              Started revatio    5/2022 PVP greenlight    Today  Here today s/p PVP greenlight on 5/10/2022. He is having a daily bowel movement with daily MiraLAX. He denies nocturia, dysuria or gross hematuria. He is urinating every 1.5 hours during the day. He is wearing one brief per day for UUI, but states this is a very small amount. PVR is 124 mL.     Plan  Urine culture    Continued observation    F/U in 6-8 weeks    If frequency and UUI continues we will start an anticholinergic

## 2022-08-05 LAB
CULTURE: NO GROWTH
SPECIMEN DESCRIPTION: NORMAL

## 2022-08-08 ENCOUNTER — TELEPHONE (OUTPATIENT)
Dept: UROLOGY | Age: 57
End: 2022-08-08

## 2022-08-08 NOTE — TELEPHONE ENCOUNTER
----- [facility] sent at 8/8/2022  9:48 AM EDT -----  Please call pt - urine culture reviewed and does not show UTI

## 2022-09-22 ENCOUNTER — OFFICE VISIT (OUTPATIENT)
Dept: UROLOGY | Age: 57
End: 2022-09-22
Payer: MEDICARE

## 2022-09-22 VITALS
DIASTOLIC BLOOD PRESSURE: 83 MMHG | HEART RATE: 74 BPM | TEMPERATURE: 98.7 F | BODY MASS INDEX: 26.18 KG/M2 | WEIGHT: 193 LBS | SYSTOLIC BLOOD PRESSURE: 148 MMHG

## 2022-09-22 DIAGNOSIS — N52.9 ERECTILE DYSFUNCTION, UNSPECIFIED ERECTILE DYSFUNCTION TYPE: ICD-10-CM

## 2022-09-22 DIAGNOSIS — N40.1 BPH WITH OBSTRUCTION/LOWER URINARY TRACT SYMPTOMS: Primary | ICD-10-CM

## 2022-09-22 DIAGNOSIS — R35.0 FREQUENCY OF URINATION: ICD-10-CM

## 2022-09-22 DIAGNOSIS — N13.8 BPH WITH OBSTRUCTION/LOWER URINARY TRACT SYMPTOMS: Primary | ICD-10-CM

## 2022-09-22 PROCEDURE — 99214 OFFICE O/P EST MOD 30 MIN: CPT | Performed by: UROLOGY

## 2022-09-22 PROCEDURE — 51798 US URINE CAPACITY MEASURE: CPT | Performed by: UROLOGY

## 2022-09-22 RX ORDER — OXYBUTYNIN CHLORIDE 5 MG/1
5 TABLET, EXTENDED RELEASE ORAL DAILY
Qty: 30 TABLET | Refills: 3 | Status: SHIPPED | OUTPATIENT
Start: 2022-09-22 | End: 2022-10-17

## 2022-09-22 RX ORDER — SILDENAFIL CITRATE 20 MG/1
60 TABLET ORAL PRN
Qty: 30 TABLET | Refills: 3 | Status: SHIPPED | OUTPATIENT
Start: 2022-09-22

## 2022-09-22 ASSESSMENT — ENCOUNTER SYMPTOMS
SHORTNESS OF BREATH: 0
WHEEZING: 0
BACK PAIN: 0
CONSTIPATION: 0
COUGH: 0
VOMITING: 0
NAUSEA: 0
COLOR CHANGE: 0
ABDOMINAL PAIN: 0
EYE REDNESS: 0

## 2022-09-22 NOTE — PROGRESS NOTES
HPI:          Patient is a 61 y.o. male in no acute distress. He is alert and oriented to person, place, and time. History  7/2019 self referral for left flank pain and lower urinary tract symptoms. He does have MRDD and lives at a group home. Complains of frequency, urgency, and dysuria. He denies gross hematuria. He does admit to constipation, only have a BM 1-2 times per week. He consumes a large amount of bladder irritants. He also has uncontrolled DM. Most recent Hgb A1c was 8.0. He has never seen urology in the past.               Flomax daily                            Discussed bladder irritants thoroughly. Patient instructed to avoid/minimize intake of food/ drinks such as: coffee, tea, caffeine, alcohol, carbonated beverages, soda pop, spicy/acidic foods. I also explained the importance of having soft, daily bowel movements to improve his urinary symptoms. He will take MiraLAX daily. He was sent home with written instructions on how to take this medication. I did explain the importance of improved glycemic control to improve his urinary symptoms as well. He was encouraged to work toward an A1c of 7 or less. CT showed no stones or hydronephrosis     3/2021 ED              Started revatio     5/2022 PVP greenlight     Currently  Patient is here today for 6-week follow-up. Patient is approximately 4 months status post PVP greenlight. Patient was able to void 200 mL today. His postvoid residual was 193. Patient is still voiding approximately 1.5 hours during the day. He is also wearing a brief mainly when he goes out. This is more for insurance purposes. Patient does report that his urinary frequency is the same as it was before surgery. He does feel like his urine flow is much improved though.     Past Medical History:   Diagnosis Date    Hyperlipidemia     Nephrolithiasis 2007    Tobacco abuse     Type 2 diabetes mellitus ([facility] [address] Past Surgical History:   Procedure Laterality Date    APPENDECTOMY  1978    COLONOSCOPY  3/3/15    -polyps    TONSILLECTOMY  1974    TURP  05/10/2022    Dr Joseph Rivera- PVP Greenlight    TURP N/A 5/10/2022    CYSTOSCOPY TRANSURETHRAL RESECTION PROSTATE LASER-PVP GREENLIGHT performed by Rissa Davis MD at [address] Encounter Medications as of 9/22/2022   Medication Sig Dispense Refill    linagliptin (TRADJENTA) 5 MG tablet Take 1 tablet by mouth daily 90 tablet 1    Continuous Blood Gluc Sensor (FREESTYLE HELEN 2 SENSOR) MISC USE WITH FREESTYLE HELEN 2 READER TO CHECK BLOOD SUGARS 6-8 TIMES A DAY      JARDIANCE 10 MG tablet TAKE 1 TABLET BY MOUTH EVERY MORNING      aspirin 81 MG EC tablet Take 1 tablet by mouth daily 90 tablet 3    Insulin Pen Needle (BD PEN NEEDLE ERVIN 2ND GEN) 32G X 4 MM MISC 1 each by Does not apply route daily 100 each 3    atorvastatin (LIPITOR) 80 MG tablet Take 1 tablet by mouth daily 90 tablet 3    pantoprazole (PROTONIX) 40 MG tablet Take 1 tablet by mouth daily 90 tablet 1    omeprazole (PRILOSEC) 20 MG delayed release capsule Take 1 capsule by mouth Daily 90 capsule 3    FLUoxetine (PROZAC) 10 MG capsule Take 1 capsule by mouth daily 90 capsule 3    insulin glargine (LANTUS) 100 UNIT/ML injection vial Inject 38 Units into the skin every morning       Continuous Blood Gluc  (FREESTYLE HELEN 2 READER) LEROY USE AS DIRECTED TO CHECK BLOOD SUGAR 6 TIMES DAILY      sildenafil (REVATIO) 20 MG tablet Take 3 tablets by mouth as needed (erectile dysfunction) 30 tablet 3    metFORMIN (GLUCOPHAGE) 850 MG tablet Take 1 tablet by mouth 2 times daily (with meals) 180 tablet 1    OZEMPIC, 0.25 OR 0.5 MG/DOSE, 2 MG/1.5ML SOPN INJECT 0.25 MG SUBCUTANEOUS WEEKLY FOR 4 WEEKS, THEN INCREASE TO 0.5MG      polyethylene glycol (GLYCOLAX) powder Take 17 g by mouth daily      Lancets MISC 1 each by Does not apply route 2 times daily (Patient taking differently: 1 each by Does prior to visit. Patient has no known allergies. Family History   Problem Relation Age of Onset    High Blood Pressure Father      Social History     Tobacco Use   Smoking Status Every Day    Packs/day: 0.50    Years: 16.00    Pack years: 8.00    Types: Cigarettes   Smokeless Tobacco Never   Tobacco Comments    Pt states trying to quit       Social History     Substance and Sexual Activity   Alcohol Use Yes    Comment: socially       Review of Systems   Constitutional:  Negative for appetite change, chills and fever. Eyes:  Negative for redness and visual disturbance. Respiratory:  Negative for cough, shortness of breath and wheezing. Cardiovascular:  Negative for chest pain and leg swelling. Gastrointestinal:  Negative for abdominal pain, constipation, nausea and vomiting. Genitourinary:  Positive for frequency and urgency. Negative for decreased urine volume, difficulty urinating, dysuria, enuresis, flank pain, hematuria, penile discharge, penile pain, scrotal swelling and testicular pain. Musculoskeletal:  Negative for back pain, joint swelling and myalgias. Skin:  Negative for color change, rash and wound. Neurological:  Negative for dizziness, tremors and numbness. Hematological:  Negative for adenopathy. Does not bruise/bleed easily. BP (!) 148/83 (Site: Right Upper Arm, Position: Sitting, Cuff Size: Medium Adult)   Pulse 74   Temp 98.7 °F (37.1 °C)   Wt 193 lb (87.5 kg)   BMI 26.18 kg/m²       PHYSICAL EXAM:  Constitutional: Patient in no acute distress; Neuro: alert and oriented to person place and time. Psych: Mood and affect normal.  Skin: Normal  Lungs: Respiratory effort normal  Cardiovascular:  Normal peripheral pulses  Abdomen: Soft, non-tender, non-distended with no CVA, flank pain  Bladder non-tender and not distended.   Lymphatics: no palpable lymphadenopathy  Penis normal  Urethral meatus normal  Scrotal exam normal  Testicles normal bilaterally  Epididymis normal bilaterally  No evidence of inguinal hernia    Lab Results   Component Value Date    BUN 21 (H) 04/11/2022     Lab Results   Component Value Date    CREATININE 0.67 (L) 04/11/2022     Lab Results   Component Value Date    PSA 1.89 04/11/2022    PSA 1.44 08/21/2019    PSA 1.50 10/08/2018       ASSESSMENT:  This is a 61 y.o. male with the following diagnoses:   Diagnosis Orders   1. BPH with obstruction/lower urinary tract symptoms  VA MEASUREMENT,POST-VOID RESIDUAL VOLUME BY US,NON-IMAGING    oxybutynin (DITROPAN XL) 5 MG extended release tablet      2. Erectile dysfunction, unspecified erectile dysfunction type  VA MEASUREMENT,POST-VOID RESIDUAL VOLUME BY US,NON-IMAGING    oxybutynin (DITROPAN XL) 5 MG extended release tablet      3. Frequency of urination  VA MEASUREMENT,POST-VOID RESIDUAL VOLUME BY US,NON-IMAGING    oxybutynin (DITROPAN XL) 5 MG extended release tablet           PLAN:  Patient will follow up with us in 6 weeks. We will need to repeat his postvoid residual.  We did start him on Ditropan XL 5 mg daily today. Patient will continue to use Revatio for his erectile dysfunction. He will try 4 to 5 tablets as he has not tried these yet. He will consume on an empty stomach. He will also try to initiate intercourse within 30 to 40 minutes.

## 2022-10-15 DIAGNOSIS — R35.0 FREQUENCY OF URINATION: ICD-10-CM

## 2022-10-15 DIAGNOSIS — N13.8 BPH WITH OBSTRUCTION/LOWER URINARY TRACT SYMPTOMS: ICD-10-CM

## 2022-10-15 DIAGNOSIS — N40.1 BPH WITH OBSTRUCTION/LOWER URINARY TRACT SYMPTOMS: ICD-10-CM

## 2022-10-15 DIAGNOSIS — N52.9 ERECTILE DYSFUNCTION, UNSPECIFIED ERECTILE DYSFUNCTION TYPE: ICD-10-CM

## 2022-10-17 RX ORDER — OXYBUTYNIN CHLORIDE 5 MG/1
TABLET, EXTENDED RELEASE ORAL
Qty: 30 TABLET | Refills: 0 | Status: SHIPPED | OUTPATIENT
Start: 2022-10-17 | End: 2022-11-03

## 2022-10-17 NOTE — TELEPHONE ENCOUNTER
We will give patient 1 refill at this time. He has upcoming appointment to discuss efficacy of this medication.

## 2022-10-21 DIAGNOSIS — N13.8 BPH WITH OBSTRUCTION/LOWER URINARY TRACT SYMPTOMS: ICD-10-CM

## 2022-10-21 DIAGNOSIS — N52.9 ERECTILE DYSFUNCTION, UNSPECIFIED ERECTILE DYSFUNCTION TYPE: ICD-10-CM

## 2022-10-21 DIAGNOSIS — R35.0 FREQUENCY OF URINATION: ICD-10-CM

## 2022-10-21 DIAGNOSIS — N40.1 BPH WITH OBSTRUCTION/LOWER URINARY TRACT SYMPTOMS: ICD-10-CM

## 2022-10-24 RX ORDER — OXYBUTYNIN CHLORIDE 5 MG/1
TABLET, EXTENDED RELEASE ORAL
Qty: 90 TABLET | Refills: 3 | OUTPATIENT
Start: 2022-10-24

## 2022-11-03 ENCOUNTER — OFFICE VISIT (OUTPATIENT)
Dept: UROLOGY | Age: 57
End: 2022-11-03
Payer: MEDICARE

## 2022-11-03 VITALS
DIASTOLIC BLOOD PRESSURE: 83 MMHG | BODY MASS INDEX: 25.87 KG/M2 | HEIGHT: 72 IN | WEIGHT: 191 LBS | TEMPERATURE: 99.1 F | SYSTOLIC BLOOD PRESSURE: 131 MMHG | HEART RATE: 76 BPM

## 2022-11-03 DIAGNOSIS — N39.41 URGE INCONTINENCE: ICD-10-CM

## 2022-11-03 DIAGNOSIS — N13.8 BPH WITH OBSTRUCTION/LOWER URINARY TRACT SYMPTOMS: ICD-10-CM

## 2022-11-03 DIAGNOSIS — R33.9 INCOMPLETE BLADDER EMPTYING: Primary | ICD-10-CM

## 2022-11-03 DIAGNOSIS — N40.1 BPH WITH OBSTRUCTION/LOWER URINARY TRACT SYMPTOMS: ICD-10-CM

## 2022-11-03 DIAGNOSIS — R35.0 FREQUENCY OF URINATION: ICD-10-CM

## 2022-11-03 DIAGNOSIS — N32.81 OAB (OVERACTIVE BLADDER): ICD-10-CM

## 2022-11-03 PROCEDURE — 99214 OFFICE O/P EST MOD 30 MIN: CPT | Performed by: NURSE PRACTITIONER

## 2022-11-03 PROCEDURE — 51798 US URINE CAPACITY MEASURE: CPT | Performed by: NURSE PRACTITIONER

## 2022-11-03 ASSESSMENT — ENCOUNTER SYMPTOMS
VOMITING: 0
BACK PAIN: 0
EYE REDNESS: 0
CONSTIPATION: 0
SHORTNESS OF BREATH: 0
COUGH: 0
COLOR CHANGE: 0
WHEEZING: 0
ABDOMINAL PAIN: 0
NAUSEA: 0

## 2022-11-03 NOTE — PROGRESS NOTES
HPI:          Patient is a 61 y.o. male in no acute distress. He is alert and oriented to person, place, and time. History  7/2019 self referral for left flank pain and lower urinary tract symptoms. He does have MRDD and lives at a group home. Complains of frequency, urgency, and dysuria. He denies gross hematuria. He does admit to constipation, only have a BM 1-2 times per week. He consumes a large amount of bladder irritants. He also has uncontrolled DM. Most recent Hgb A1c was 8.0. He has never seen urology in the past.               Flomax daily                            Discussed bladder irritants thoroughly. Patient instructed to avoid/minimize intake of food/ drinks such as: coffee, tea, caffeine, alcohol, carbonated beverages, soda pop, spicy/acidic foods. I also explained the importance of having soft, daily bowel movements to improve his urinary symptoms. He will take MiraLAX daily. He was sent home with written instructions on how to take this medication. I did explain the importance of improved glycemic control to improve his urinary symptoms as well. He was encouraged to work toward an A1c of 7 or less. CT showed no stones or hydronephrosis     3/2021 ED              Started revatio     5/2022 PVP greenlight    9/2022 frequency every 1.5 hours and urge incontinence   Started oxybutynin 5 mg extended release    Today  Here today to follow-up for overactive bladder and incomplete bladder emptying. He is status post PVP greenlight in 5/2022. At his last visit he was started on oxybutynin 5 mg extended release. He denies any incontinence. He denies nocturia. He is urinating every 2-3 hours during the day. Unfortunately, his postvoid residual is elevated 245 mL. He is having daily bowel movements. Urine cultures have been negative.     Past Medical History:   Diagnosis Date    Hyperlipidemia     Nephrolithiasis 2007    Tobacco abuse Type 2 diabetes mellitus [facility])      Past Surgical History:   Procedure Laterality Date    APPENDECTOMY  1978    COLONOSCOPY  3/3/15    -polyps    TONSILLECTOMY  1974    TURP  05/10/2022    Dr Santosh Chau- PVP Greenlight    TURP N/A 5/10/2022    CYSTOSCOPY TRANSURETHRAL RESECTION PROSTATE LASER-PVP GREENLIGHT performed by Esha Dillon MD at [address] Encounter Medications as of 11/3/2022   Medication Sig Dispense Refill    mirabegron (MYRBETRIQ) 50 MG TB24 Take 50 mg by mouth daily 90 tablet 1    sildenafil (REVATIO) 20 MG tablet Take 3 tablets by mouth as needed (erectile dysfunction) 30 tablet 3    pantoprazole (PROTONIX) 40 MG tablet Take 1 tablet by mouth daily 90 tablet 1    omeprazole (PRILOSEC) 20 MG delayed release capsule Take 1 capsule by mouth Daily 90 capsule 3    insulin glargine (LANTUS) 100 UNIT/ML injection vial Inject 32 Units into the skin every morning      metFORMIN (GLUCOPHAGE) 850 MG tablet Take 1 tablet by mouth 2 times daily (with meals) 180 tablet 1    OZEMPIC, 0.25 OR 0.5 MG/DOSE, 2 MG/1.5ML SOPN INJECT 0.25 MG SUBCUTANEOUS WEEKLY FOR 4 WEEKS, THEN INCREASE TO 0.5MG      polyethylene glycol (GLYCOLAX) powder Take 17 g by mouth daily      FLUoxetine (PROZAC) 10 MG capsule Take 1 capsule by mouth daily 90 capsule 3    [DISCONTINUED] oxybutynin (DITROPAN-XL) 5 MG extended release tablet TAKE 1 TABLET BY MOUTH EVERY DAY 30 tablet 0    [DISCONTINUED] linagliptin (TRADJENTA) 5 MG tablet Take 1 tablet by mouth daily (Patient not taking: Reported on 11/3/2022) 90 tablet 1    Continuous Blood Gluc Sensor (FREESTYLE HELEN 2 SENSOR) MISC USE WITH FREESTYLE HELEN 2 READER TO CHECK BLOOD SUGARS 6-8 TIMES A DAY      JARDIANCE 10 MG tablet TAKE 1 TABLET BY MOUTH EVERY MORNING      aspirin 81 MG EC tablet Take 1 tablet by mouth daily 90 tablet 3    Insulin Pen Needle (BD PEN NEEDLE ERVIN 2ND GEN) 32G X 4 MM MISC 1 each by Does not apply route daily 100 each 3    atorvastatin (LIPITOR) 80 MG tablet Take 1 tablet by mouth daily 90 tablet 3    Continuous Blood Gluc  (FREESTYLE HELEN 2 READER) LEROY USE AS DIRECTED TO CHECK BLOOD SUGAR 6 TIMES DAILY      Lancets MISC 1 each by Does not apply route 2 times daily (Patient taking differently: 1 each by Does not apply route 2 times daily When freestyle isn't working then he checks his sugars) 100 each 3     No facility-administered encounter medications on file as of 11/3/2022.       Current Outpatient Medications on File Prior to Visit   Medication Sig Dispense Refill    sildenafil (REVATIO) 20 MG tablet Take 3 tablets by mouth as needed (erectile dysfunction) 30 tablet 3    pantoprazole (PROTONIX) 40 MG tablet Take 1 tablet by mouth daily 90 tablet 1    omeprazole (PRILOSEC) 20 MG delayed release capsule Take 1 capsule by mouth Daily 90 capsule 3    insulin glargine (LANTUS) 100 UNIT/ML injection vial Inject 32 Units into the skin every morning      metFORMIN (GLUCOPHAGE) 850 MG tablet Take 1 tablet by mouth 2 times daily (with meals) 180 tablet 1    OZEMPIC, 0.25 OR 0.5 MG/DOSE, 2 MG/1.5ML SOPN INJECT 0.25 MG SUBCUTANEOUS WEEKLY FOR 4 WEEKS, THEN INCREASE TO 0.5MG      polyethylene glycol (GLYCOLAX) powder Take 17 g by mouth daily      FLUoxetine (PROZAC) 10 MG capsule Take 1 capsule by mouth daily 90 capsule 3    Continuous Blood Gluc Sensor (FREESTYLE HELEN 2 SENSOR) MISC USE WITH FREESTYLE HELEN 2 READER TO CHECK BLOOD SUGARS 6-8 TIMES A DAY      JARDIANCE 10 MG tablet TAKE 1 TABLET BY MOUTH EVERY MORNING      aspirin 81 MG EC tablet Take 1 tablet by mouth daily 90 tablet 3    Insulin Pen Needle (BD PEN NEEDLE ERVIN 2ND GEN) 32G X 4 MM MISC 1 each by Does not apply route daily 100 each 3    atorvastatin (LIPITOR) 80 MG tablet Take 1 tablet by mouth daily 90 tablet 3    Continuous Blood Gluc  (FREESTYLE HELEN 2 READER) LEROY USE AS DIRECTED TO CHECK BLOOD SUGAR 6 TIMES DAILY      Lancets MISC 1 each by Does not apply route 2 times daily (Patient taking differently: 1 each by Does not apply route 2 times daily When freestyle isn't working then he checks his sugars) 100 each 3     No current facility-administered medications on file prior to visit. Patient has no known allergies. Family History   Problem Relation Age of Onset    High Blood Pressure Father      Social History     Tobacco Use   Smoking Status Every Day    Packs/day: 0.50    Years: 16.00    Pack years: 8.00    Types: Cigarettes   Smokeless Tobacco Never   Tobacco Comments    Pt states trying to quit       Social History     Substance and Sexual Activity   Alcohol Use Yes    Comment: socially       Review of Systems   Constitutional:  Negative for appetite change, chills and fever. Eyes:  Negative for redness and visual disturbance. Respiratory:  Negative for cough, shortness of breath and wheezing. Cardiovascular:  Negative for chest pain and leg swelling. Gastrointestinal:  Negative for abdominal pain, constipation, nausea and vomiting. Genitourinary:  Positive for decreased urine volume. Negative for difficulty urinating, dysuria, enuresis, flank pain, frequency, hematuria, penile discharge, penile pain, scrotal swelling, testicular pain and urgency. Musculoskeletal:  Negative for back pain, joint swelling and myalgias. Skin:  Negative for color change, rash and wound. Neurological:  Negative for dizziness, tremors and numbness. Hematological:  Negative for adenopathy. Does not bruise/bleed easily. /83 (Site: Right Upper Arm, Position: Sitting, Cuff Size: Large Adult)   Pulse 76   Temp 99.1 °F (37.3 °C)   Ht 6' (1.829 m)   Wt 191 lb (86.6 kg)   BMI 25.90 kg/m²       PHYSICAL EXAM:  Constitutional: Patient in no acute distress; Neuro: alert and oriented to person place and time.     Psych: Mood and affect normal.  Skin: Normal  Lungs: Respiratory effort normal  Cardiovascular:  Normal peripheral pulses  Abdomen: Soft, non-tender, non-distended with no CVA, flank pain  Bladder non-tender and not distended. Lab Results   Component Value Date    BUN 21 (H) 04/11/2022     Lab Results   Component Value Date    CREATININE 0.67 (L) 04/11/2022     Lab Results   Component Value Date    PSA 1.89 04/11/2022    PSA 1.44 08/21/2019    PSA 1.50 10/08/2018       ASSESSMENT:   Diagnosis Orders   1. Incomplete bladder emptying  AL MEASUREMENT,POST-VOID RESIDUAL VOLUME BY US,NON-IMAGING      2. OAB (overactive bladder)  AL MEASUREMENT,POST-VOID RESIDUAL VOLUME BY US,NON-IMAGING      3. BPH with obstruction/lower urinary tract symptoms  AL MEASUREMENT,POST-VOID RESIDUAL VOLUME BY US,NON-IMAGING      4. Frequency of urination  AL MEASUREMENT,POST-VOID RESIDUAL VOLUME BY US,NON-IMAGING      5.  Urge incontinence  AL MEASUREMENT,POST-VOID RESIDUAL VOLUME BY US,NON-IMAGING            PLAN:  Stop oxybutynin due to incomplete bladder emptying    Start myrbetriq 50mg    F/U in 3 weeks with nurse visit to check a PVR    F/U with provider in 8 weeks to recheck urinary symptoms

## 2022-11-15 DIAGNOSIS — R35.0 FREQUENCY OF URINATION: ICD-10-CM

## 2022-11-15 DIAGNOSIS — N40.1 BPH WITH OBSTRUCTION/LOWER URINARY TRACT SYMPTOMS: ICD-10-CM

## 2022-11-15 DIAGNOSIS — N52.9 ERECTILE DYSFUNCTION, UNSPECIFIED ERECTILE DYSFUNCTION TYPE: ICD-10-CM

## 2022-11-15 DIAGNOSIS — N13.8 BPH WITH OBSTRUCTION/LOWER URINARY TRACT SYMPTOMS: ICD-10-CM

## 2022-11-15 RX ORDER — OXYBUTYNIN CHLORIDE 5 MG/1
TABLET, EXTENDED RELEASE ORAL
Qty: 30 TABLET | Refills: 0 | OUTPATIENT
Start: 2022-11-15

## 2022-11-28 ENCOUNTER — HOSPITAL ENCOUNTER (OUTPATIENT)
Age: 57
Setting detail: SPECIMEN
Discharge: HOME | End: 2022-11-28
Payer: MEDICARE

## 2022-11-28 ENCOUNTER — NURSE ONLY (OUTPATIENT)
Dept: UROLOGY | Age: 57
End: 2022-11-28
Payer: MEDICARE

## 2022-11-28 VITALS
BODY MASS INDEX: 25.31 KG/M2 | TEMPERATURE: 97.7 F | WEIGHT: 191 LBS | HEIGHT: 73 IN | DIASTOLIC BLOOD PRESSURE: 80 MMHG | SYSTOLIC BLOOD PRESSURE: 134 MMHG

## 2022-11-28 DIAGNOSIS — N45.2 ORCHITIS OF RIGHT TESTICLE: ICD-10-CM

## 2022-11-28 DIAGNOSIS — N13.8 BPH WITH OBSTRUCTION/LOWER URINARY TRACT SYMPTOMS: Primary | ICD-10-CM

## 2022-11-28 DIAGNOSIS — N40.1 BPH WITH OBSTRUCTION/LOWER URINARY TRACT SYMPTOMS: Primary | ICD-10-CM

## 2022-11-28 LAB
BACTERIA: ABNORMAL
BILIRUBIN URINE: NEGATIVE
COLOR: YELLOW
EPITHELIAL CELLS UA: ABNORMAL /HPF (ref 0–5)
GLUCOSE URINE: ABNORMAL
KETONES, URINE: NEGATIVE
LEUKOCYTE ESTERASE, URINE: NEGATIVE
NITRITE, URINE: NEGATIVE
PH UA: 6 (ref 5–9)
PROTEIN UA: NEGATIVE
RBC UA: ABNORMAL /HPF (ref 0–2)
SPECIFIC GRAVITY UA: 1.01 (ref 1.01–1.02)
TURBIDITY: CLEAR
URINE HGB: NEGATIVE
UROBILINOGEN, URINE: NORMAL
WBC UA: ABNORMAL /HPF (ref 0–5)

## 2022-11-28 PROCEDURE — 87086 URINE CULTURE/COLONY COUNT: CPT

## 2022-11-28 PROCEDURE — 81001 URINALYSIS AUTO W/SCOPE: CPT

## 2022-11-28 PROCEDURE — 99214 OFFICE O/P EST MOD 30 MIN: CPT | Performed by: PHYSICIAN ASSISTANT

## 2022-11-28 PROCEDURE — 51798 US URINE CAPACITY MEASURE: CPT | Performed by: PHYSICIAN ASSISTANT

## 2022-11-28 RX ORDER — LEVOFLOXACIN 500 MG/1
500 TABLET, FILM COATED ORAL DAILY
Qty: 10 TABLET | Refills: 0 | Status: SHIPPED | OUTPATIENT
Start: 2022-11-28 | End: 2022-12-08

## 2022-11-28 ASSESSMENT — ENCOUNTER SYMPTOMS
SHORTNESS OF BREATH: 0
CONSTIPATION: 0
ABDOMINAL PAIN: 0
VOMITING: 0
COLOR CHANGE: 0
COUGH: 0
WHEEZING: 0
NAUSEA: 0
EYE REDNESS: 0
BACK PAIN: 0

## 2022-11-28 NOTE — PROGRESS NOTES
HPI:      Patient is a 61 y.o. male in no acute distress. He is alert and oriented to person, place, and time. History  7/2019 self referral for left flank pain and lower urinary tract symptoms. He does have MRDD and lives at a group home. Complains of frequency, urgency, and dysuria. He denies gross hematuria. He does admit to constipation, only have a BM 1-2 times per week. He consumes a large amount of bladder irritants. He also has uncontrolled DM. Most recent Hgb A1c was 8.0. He has never seen urology in the past.               Flomax daily                            Discussed bladder irritants thoroughly. Patient instructed to avoid/minimize intake of food/ drinks such as: coffee, tea, caffeine, alcohol, carbonated beverages, soda pop, spicy/acidic foods. I also explained the importance of having soft, daily bowel movements to improve his urinary symptoms. He will take MiraLAX daily. He was sent home with written instructions on how to take this medication. I did explain the importance of improved glycemic control to improve his urinary symptoms as well. He was encouraged to work toward an A1c of 7 or less. CT showed no stones or hydronephrosis     3/2021 ED              Started revatio     5/2022 PVP greenlight    9/2022 frequency every 1.5 hours and urge incontinence   Started oxybutynin 5 mg extended release    11/2022 -oxybutynin stopped secondary to incomplete bladder emptying, Myrbetriq started    Today  Patient is here today to follow-up for overactive bladder and incomplete bladder emptying. He is status post PVP greenlight in 5/2022. At last visit we did stop his oxybutynin secondary to incomplete bladder emptying. We did start her him on Myrbetriq. He is here today for bladder scan. Bladderscan performed in office today: Pt voided - 180 mL, PVR - 184 mL. He is complaining of right testicular/groin pain today.   He states his pain has been going on for a while. Patient states that he \"wants\" to be sexually active but is not currently. He is having daily bowel movements. He is taking Myrbetriq. He is having no issues with this medication at this time.     Past Medical History:   Diagnosis Date    Hyperlipidemia     Nephrolithiasis 2007    Tobacco abuse     Type 2 diabetes mellitus ([address]      Past Surgical History:   Procedure Laterality Date    APPENDECTOMY  1978    COLONOSCOPY  3/3/15    -polyps    TONSILLECTOMY  1974    TURP  05/10/2022    Dr Rosa Isela Salinas- PVP Greenlight    TURP N/A 5/10/2022    CYSTOSCOPY TRANSURETHRAL RESECTION PROSTATE LASER-PVP GREENLIGHT performed by Cat Field MD at [facility] Encounter Medications as of 11/28/2022   Medication Sig Dispense Refill    levoFLOXacin (LEVAQUIN) 500 MG tablet Take 1 tablet by mouth daily for 10 days 10 tablet 0    atorvastatin (LIPITOR) 80 MG tablet Take 1 tablet by mouth three times a week 38 tablet 3    mirabegron (MYRBETRIQ) 50 MG TB24 Take 50 mg by mouth daily 90 tablet 1    FLUoxetine (PROZAC) 10 MG capsule Take 1 capsule by mouth daily 90 capsule 3    sildenafil (REVATIO) 20 MG tablet Take 3 tablets by mouth as needed (erectile dysfunction) 30 tablet 3    Continuous Blood Gluc Sensor (FREESTYLE HELEN 2 SENSOR) MISC USE WITH FREESTYLE HELEN 2 READER TO CHECK BLOOD SUGARS 6-8 TIMES A DAY      JARDIANCE 10 MG tablet TAKE 1 TABLET BY MOUTH EVERY MORNING      aspirin 81 MG EC tablet Take 1 tablet by mouth daily 90 tablet 3    Insulin Pen Needle (BD PEN NEEDLE ERVIN 2ND GEN) 32G X 4 MM MISC 1 each by Does not apply route daily 100 each 3    omeprazole (PRILOSEC) 20 MG delayed release capsule Take 1 capsule by mouth Daily 90 capsule 3    insulin glargine (LANTUS) 100 UNIT/ML injection vial Inject 32 Units into the skin every morning      Continuous Blood Gluc  (FREESTYLE HELEN 2 READER) LEROY USE AS DIRECTED TO CHECK BLOOD SUGAR 6 TIMES DAILY      metFORMIN (GLUCOPHAGE) 850 MG tablet Take 1 tablet by mouth 2 times daily (with meals) 180 tablet 1    OZEMPIC, 0.25 OR 0.5 MG/DOSE, 2 MG/1.5ML SOPN INJECT 0.25 MG SUBCUTANEOUS WEEKLY FOR 4 WEEKS, THEN INCREASE TO 0.5MG      polyethylene glycol (GLYCOLAX) powder Take 17 g by mouth daily      Lancets MISC 1 each by Does not apply route 2 times daily (Patient taking differently: 1 each by Does not apply route 2 times daily When freestyle isn't working then he checks his sugars) 100 each 3     No facility-administered encounter medications on file as of 11/28/2022.       Current Outpatient Medications on File Prior to Visit   Medication Sig Dispense Refill    atorvastatin (LIPITOR) 80 MG tablet Take 1 tablet by mouth three times a week 38 tablet 3    mirabegron (MYRBETRIQ) 50 MG TB24 Take 50 mg by mouth daily 90 tablet 1    FLUoxetine (PROZAC) 10 MG capsule Take 1 capsule by mouth daily 90 capsule 3    sildenafil (REVATIO) 20 MG tablet Take 3 tablets by mouth as needed (erectile dysfunction) 30 tablet 3    Continuous Blood Gluc Sensor (FREESTYLE HELEN 2 SENSOR) MISC USE WITH FREESTYLE HELEN 2 READER TO CHECK BLOOD SUGARS 6-8 TIMES A DAY      JARDIANCE 10 MG tablet TAKE 1 TABLET BY MOUTH EVERY MORNING      aspirin 81 MG EC tablet Take 1 tablet by mouth daily 90 tablet 3    Insulin Pen Needle (BD PEN NEEDLE ERVIN 2ND GEN) 32G X 4 MM MISC 1 each by Does not apply route daily 100 each 3    omeprazole (PRILOSEC) 20 MG delayed release capsule Take 1 capsule by mouth Daily 90 capsule 3    insulin glargine (LANTUS) 100 UNIT/ML injection vial Inject 32 Units into the skin every morning      Continuous Blood Gluc  (FREESTYLE HELEN 2 READER) LEROY USE AS DIRECTED TO CHECK BLOOD SUGAR 6 TIMES DAILY      metFORMIN (GLUCOPHAGE) 850 MG tablet Take 1 tablet by mouth 2 times daily (with meals) 180 tablet 1    OZEMPIC, 0.25 OR 0.5 MG/DOSE, 2 MG/1.5ML SOPN INJECT 0.25 MG SUBCUTANEOUS WEEKLY FOR 4 WEEKS, THEN INCREASE TO 0.5MG polyethylene glycol (GLYCOLAX) powder Take 17 g by mouth daily      Lancets MISC 1 each by Does not apply route 2 times daily (Patient taking differently: 1 each by Does not apply route 2 times daily When freestyle isn't working then he checks his sugars) 100 each 3     No current facility-administered medications on file prior to visit. Patient has no known allergies. Family History   Problem Relation Age of Onset    High Blood Pressure Father      Social History     Tobacco Use   Smoking Status Every Day    Packs/day: 0.50    Years: 16.00    Pack years: 8.00    Types: Cigarettes   Smokeless Tobacco Never   Tobacco Comments    Pt states trying to quit       Social History     Substance and Sexual Activity   Alcohol Use Yes    Comment: socially       Review of Systems   Constitutional:  Negative for appetite change, chills and fever. Eyes:  Negative for redness and visual disturbance. Respiratory:  Negative for cough, shortness of breath and wheezing. Cardiovascular:  Negative for chest pain and leg swelling. Gastrointestinal:  Negative for abdominal pain, constipation, nausea and vomiting. Genitourinary:  Positive for testicular pain. Negative for decreased urine volume, difficulty urinating, dysuria, enuresis, flank pain, frequency, hematuria, penile discharge, penile pain, scrotal swelling and urgency. Positive for nocturia   Musculoskeletal:  Negative for back pain, joint swelling and myalgias. Skin:  Negative for color change, rash and wound. Neurological:  Negative for dizziness, tremors and numbness. Hematological:  Negative for adenopathy. Does not bruise/bleed easily. /80 (Site: Right Upper Arm, Position: Sitting, Cuff Size: Medium Adult)   Temp 97.7 °F (36.5 °C) (Infrared)   Ht 6' 1\" (1.854 m)   Wt 191 lb (86.6 kg)   BMI 25.20 kg/m²       PHYSICAL EXAM:  Constitutional: Patient in no acute distress; Neuro: alert and oriented to person place and time.     Psych: Mood and affect normal.  Lungs: Respiratory effort normal  Abdomen: Soft, non-tender, non-distended   Penis normal  Urethral meatus normal  Scrotal exam normal  Testicles: Right testicular tender but otherwise normal, left testicle normal  Epididymis normal bilaterally  No evidence of inguinal hernia  Rectal: Deferred      Lab Results   Component Value Date    BUN 21 (H) 04/11/2022     Lab Results   Component Value Date    CREATININE 0.67 (L) 04/11/2022     Lab Results   Component Value Date    PSA 1.89 04/11/2022    PSA 1.44 08/21/2019    PSA 1.50 10/08/2018       ASSESSMENT:   Diagnosis Orders   1. BPH with obstruction/lower urinary tract symptoms  OH MEASUREMENT,POST-VOID RESIDUAL VOLUME BY US,NON-IMAGING      2. Orchitis of right testicle  Culture, Urine    Urinalysis with Microscopic    levoFLOXacin (LEVAQUIN) 500 MG tablet          PLAN:  Will check urinalysis culture protocol with results. We will continue his antibiotic as we are treating him for orchitis    Levaquin x10 days    Continue Myrbetriq    Follow-up as scheduled to discuss efficacy of Myrbetriq as well as to check on his orchitis.

## 2022-11-28 NOTE — PATIENT INSTRUCTIONS
SURVEY:    You may be receiving a survey from [employer] regarding your visit today. Please complete the survey to enable us to provide the highest quality of care to you and your family. If you cannot score us a very good on any question, please call the office to discuss how we could have made your experience a very good one. Thank you.

## 2022-11-29 LAB
CULTURE: NO GROWTH
SPECIMEN DESCRIPTION: NORMAL

## 2022-11-30 ENCOUNTER — TELEPHONE (OUTPATIENT)
Dept: UROLOGY | Age: 57
End: 2022-11-30

## 2022-11-30 NOTE — RESULT ENCOUNTER NOTE
Please call pt - urine culture reviewed and does not show UTI    Continue antibiotics as discussed in office for testicular infection

## 2022-11-30 NOTE — TELEPHONE ENCOUNTER
----- Message from Erica Charlton PA-C sent at 11/30/2022  9:01 AM EST -----  Please call pt - urine culture reviewed and does not show UTI    Continue antibiotics as discussed in office for testicular infection

## 2022-12-27 ENCOUNTER — APPOINTMENT (OUTPATIENT)
Dept: CT IMAGING | Age: 57
End: 2022-12-27
Payer: MEDICARE

## 2022-12-27 ENCOUNTER — HOSPITAL ENCOUNTER (EMERGENCY)
Age: 57
Discharge: HOME | End: 2022-12-27
Attending: EMERGENCY MEDICINE
Payer: MEDICARE

## 2022-12-27 VITALS
TEMPERATURE: 97.6 F | RESPIRATION RATE: 16 BRPM | DIASTOLIC BLOOD PRESSURE: 92 MMHG | HEART RATE: 87 BPM | OXYGEN SATURATION: 97 % | SYSTOLIC BLOOD PRESSURE: 168 MMHG

## 2022-12-27 DIAGNOSIS — M54.2 NECK PAIN: ICD-10-CM
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
## 2022-12-27 DIAGNOSIS — S09.90XA CLOSED HEAD INJURY, INITIAL ENCOUNTER: Primary | ICD-10-CM

## 2022-12-27 LAB — GLUCOSE BLD-MCNC: 105 MG/DL

## 2022-12-27 PROCEDURE — 72125 CT NECK SPINE W/O DYE: CPT

## 2022-12-27 PROCEDURE — 99284 EMERGENCY DEPT VISIT MOD MDM: CPT

## 2022-12-27 PROCEDURE — 70450 CT HEAD/BRAIN W/O DYE: CPT

## 2022-12-27 ASSESSMENT — PAIN - FUNCTIONAL ASSESSMENT: PAIN_FUNCTIONAL_ASSESSMENT: 0-10

## 2022-12-27 NOTE — DISCHARGE INSTRUCTIONS
Your CT imaging today showed no injuries. Return to the ED for development of persistent vomiting, changes in mental status, changes in strength or sensation or any other concerns. Use ibuprofen and/or Tylenol as needed, as directed on the OTC containers, for pain control.
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
## 2022-12-28 NOTE — ED NOTES
Pt up and ambulated with nurse, pt tolerated well. Verbalized understanding of d/c instructions.      [facility]  12/27/22 1906

## 2023-01-11 ENCOUNTER — HOSPITAL ENCOUNTER (OUTPATIENT)
Dept: PHYSICAL THERAPY | Age: 58
Setting detail: THERAPIES SERIES
Discharge: HOME | End: 2023-01-11
Payer: MEDICARE

## 2023-01-11 DIAGNOSIS — S16.1XXS STRAIN OF NECK MUSCLE, SEQUELA: Primary | ICD-10-CM

## 2023-01-11 DIAGNOSIS — R26.89 BALANCE PROBLEM: ICD-10-CM

## 2023-01-11 PROCEDURE — 97110 THERAPEUTIC EXERCISES: CPT

## 2023-01-11 PROCEDURE — 97162 PT EVAL MOD COMPLEX 30 MIN: CPT
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
## 2023-01-11 NOTE — PLAN OF CARE
[facility]           Phone: [phone]             Outpatient Physical Therapy  Fax: [phone]                                           Date: 2023  Patient: Shasha Masters : 1965 [facility] #: [id number]   Referring Physician: Krysta Abraham MD      [x] Plan of Care   [] Updated Plan of Care    Dates of Service to Include: 2023 to 23    Diagnosis:  S16. 1XXS - Strain of neck muscle, sequela ; R26.89 - Balance problem    Rehab (Treatment) Diagnosis:  Generalized weakness, neck pain, imbalance, difficulty in walking             Onset Date:  23    Attendance  Total # of Visits to Date: 1 No Show: 0 Canceled Appointment: 0    Assessment  Assessment: Pt is a 60year old male being evaluated for skilled outpatient physical therapy following fall and subsequent neck pain and balance issues. Prior to the fall pt was independent in ambulation with no use of assistive device. Pt since presenting with FWW due to imbalance and loss of balance with falling. Pt immediate balance upon standing poor with pt having to take a step or have upper extremity support prior to standing stationary from sitting. Pt lower extremities overall appear to have reduced strength, left greater than right. Pt UE strength additionally appearing reduced. Cervical AROM limited by pain. Special testing for cervical spine negative for red flags. Tenderness reported along bilateral cervical paraspinals, left greater than right. Therapist initiated HEP with exercises for balance and lower extremity deficits. Pt educated on red flags for spinal cord involvement due to nature of injury. Pt verbally acknowledged understanding. Pt to benefit from skilled outpatient physical therapy 2x per week for 6 weeks in order to reduce pain, improve strength and balance as needed for return to baseline reported function.     Objective measures:   LE strength:   Strength RLE  R Hip Flexion: 4+/5  R Hip ABduction: 4+/5  R Knee Flexion: 5/5  R Knee Extension: 5/5  R Ankle Dorsiflexion: 4+/5  Strength LLE  L Hip Flexion: 4/5  L Hip ABduction: 4/5  L Knee Flexion: 4+/5  L Knee Extension: 4+/5  L Ankle Dorsiflexion: 4/5    Special Tests:   Special Tests for Cervical Spine  Special Tests: Performed  Sharp-Мария Test : (-)  Alar Ligament Test : R (-), L (-)  Transverse Ligament Test: L (-), R (-)  Median Nerve Tension: R (-), L (-)  Radial Nerve Tension: L (-), R (-)  Ulnar Nerve Tension: R (-), L (-)    Goals  Short Term Goals  Time Frame for Short Term Goals: 3 weeks  Short Term Goal 1: Pt will be initiated and compliant with HEP for pain management and balance  Short Term Goal 2: Pt will report pain with upper extremity movement as less than 8/10 in order to improve quality of life  Short Term Goal 3: Pt will ambulate 20 feet with no assistive device without loss of balance in order to improve independence in ambulation  Long Term Goals  Time Frame for Long Term Goals : 6 weeks  Long Term Goal 1: Pt will report pain with upper extremity movement as less than 4/10 in order to improve ability to complete household chores. Long Term Goal 2: Pt will improve cervical rotation to at least 70 degrees bilateral pain free in order to improve ability to look over shoulder while walking his dog  Long Term Goal 3: Pt will be independent in ambulation without assistive device for 30 minutes of standing activity in order to improve safety with community ambulation. Long Term Goal 4: Pt will improve lower extremity strength to at least 4+/5 overall bilateral in order to improve stability in standing and ambulation.   Long Term Goal 5: Pt will improve tinetti balance and gait assessment from 17 to greater than 22 in order to reduce fall risk     Prognosis  Therapy Prognosis: Fair    Treatment Plan   Plan Frequency: 2x per week  Plan weeks: 6 weeks  [x] HP/CP      [x] Electrical Stim   [x] Therapeutic Exercise      [x] Gait Training  [] Aquatics   [] Ultrasound         [x] Patient Education/HEP   [x] Manual Therapy  [] Traction    [x] Neuro-pako        [x] Soft Tissue Mobs            [] Home TENS  [] Iontophoresis    [] Orthotic casting/fitting      [] Dry Needling  [] Blood Flow Restriction             Electronically signed by: Jeffery Landers PT, DPFRANCES    Date: 1/11/2023      ______________________________________ Date: 1/11/2023   Physician Signature

## 2023-01-13 ENCOUNTER — HOSPITAL ENCOUNTER (OUTPATIENT)
Dept: PHYSICAL THERAPY | Age: 58
Setting detail: THERAPIES SERIES
Discharge: HOME | End: 2023-01-13
Payer: MEDICARE

## 2023-01-13 ENCOUNTER — OFFICE VISIT (OUTPATIENT)
Dept: UROLOGY | Age: 58
End: 2023-01-13

## 2023-01-13 VITALS
HEART RATE: 98 BPM | WEIGHT: 191 LBS | BODY MASS INDEX: 25.31 KG/M2 | DIASTOLIC BLOOD PRESSURE: 94 MMHG | HEIGHT: 73 IN | SYSTOLIC BLOOD PRESSURE: 156 MMHG

## 2023-01-13 DIAGNOSIS — N40.1 BPH WITH OBSTRUCTION/LOWER URINARY TRACT SYMPTOMS: ICD-10-CM

## 2023-01-13 DIAGNOSIS — N32.81 OAB (OVERACTIVE BLADDER): ICD-10-CM

## 2023-01-13 DIAGNOSIS — N13.8 BPH WITH OBSTRUCTION/LOWER URINARY TRACT SYMPTOMS: ICD-10-CM

## 2023-01-13 DIAGNOSIS — R33.9 INCOMPLETE BLADDER EMPTYING: Primary | ICD-10-CM

## 2023-01-13 PROCEDURE — 97110 THERAPEUTIC EXERCISES: CPT

## 2023-01-13 PROCEDURE — 97116 GAIT TRAINING THERAPY: CPT

## 2023-01-13 RX ORDER — TAMSULOSIN HYDROCHLORIDE 0.4 MG/1
0.4 CAPSULE ORAL DAILY
Qty: 30 CAPSULE | Refills: 1 | Status: SHIPPED | OUTPATIENT
Start: 2023-01-13

## 2023-01-13 ASSESSMENT — ENCOUNTER SYMPTOMS
NAUSEA: 0
WHEEZING: 0
CONSTIPATION: 0
BACK PAIN: 0
SHORTNESS OF BREATH: 0
EYE REDNESS: 0
VOMITING: 0
COLOR CHANGE: 0
ABDOMINAL PAIN: 0
COUGH: 0

## 2023-01-13 NOTE — PROGRESS NOTES
Phone: [facility]           Fax: [phone]                           Outpatient Physical Therapy                                                                            Daily Note    Patient: Saturnino Augustin : 1965  CSN #: [id number]   Referring Physician: Sharan Richter MD    Date: 2023       Treatment Diagnosis: Generalized weakness, neck pain, imbalance, difficulty in walking    Onset Date: 23  PT Insurance Information: BCBS Medicare  Total # of Visits Approved: 12 Per Physician Order  Total # of Visits to Date: 2  No Show: 0  Canceled Appointment: 0      Pre-Treatment Pain:  10/10  Subjective: Pt arrived to treatment reporting he is sore in his neck and left shoulder from sleepin sabrina it wrong. Reports pain as 10/10, but describes the pain as a \"small kink\". Exercises:  Exercise 2: SciFit Level 2.5 8 minutes  Exercise 3: Upper trapezius stretch / levator stretch 2 x 30 seconds bilateral  Exercise 4: Lateral pulldown YTB 3 x 10  Exercise 5: Standing march / standing abduction / knee flexion 3 x 10 bilateral  Exercise 6: Toe taps 6 inch step 2 x 10 bilateral  Exercise 7: Walking without AD 4 laps in clinic  Exercise 8: Step ups 6 inch step 1 x 10 bilateral  Exercise 9: sit to stand holding 8 pound ball  Exercise 10: side stepping 6 laps at counter    Assessment  Assessment: Pt arrived to treatment with reports of severe pain in the left neck and thoracic. Therapist directed pt through treatment this date in order to facilitate balance with walking. Therapist provided verbal cuing and visual demonstration for form. Pt reported significant decrease in pain following mobility and movement. Pt able to ambulate this visit without use of FWW, some instability noted through crossing of midline and occasional stagger steps. Pt appearing to improve with continued walking.  Therapist educated pt on weaning from walker with home ambulation in order to continue to regain his prior level of ambulation. Pt ended treatment with moderate pain reported, 4/10, less than pain reported upon arrival.    Activity Tolerance  Activity Tolerance: Patient tolerated treatment well    Patient Education  Patient Education: Pt educated on form with exercises  Pt verbalized/demonstrated good understanding:     [x] Yes         [] No, pt required further clarification. Post Treatment Pain:  4/10      Plan  Plan Frequency: 2x per week  Plan weeks: 6 weeks       Goals  (Total # of Visits to Date: 2)      Short Term Goals  Time Frame for Short Term Goals: 3 weeks  Short Term Goal 1: Pt will be initiated and compliant with HEP for pain management and balance - MET  Short Term Goal 2: Pt will report pain with upper extremity movement as less than 8/10 in order to improve quality of life  Short Term Goal 3: Pt will ambulate 20 feet with no assistive device without loss of balance in order to improve independence in ambulation - met (1/13/2023 400 feet SBA by therapist, some instability)     Long Term Goals  Time Frame for Long Term Goals : 6 weeks  Long Term Goal 1: Pt will report pain with upper extremity movement as less than 4/10 in order to improve ability to complete household chores. Long Term Goal 2: Pt will improve cervical rotation to at least 70 degrees bilateral pain free in order to improve ability to look over shoulder while walking his dog  Long Term Goal 3: Pt will be independent in ambulation without assistive device for 30 minutes of standing activity in order to improve safety with community ambulation. Long Term Goal 4: Pt will improve lower extremity strength to at least 4+/5 overall bilateral in order to improve stability in standing and ambulation.   Long Term Goal 5: Pt will improve tinetti balance and gait assessment from 17 to greater than 22 in order to reduce fall risk    Minutes Tracking:  Time In: 0915  Time Out: 0956  Minutes: 41  Timed Code Treatment Minutes: [address] [place], DPT     Date: 1/13/2023

## 2023-01-13 NOTE — PROGRESS NOTES
HPI:          Patient is a 60 y.o. male in no acute distress. He is alert and oriented to person, place, and time. History  7/2019 self referral for left flank pain and lower urinary tract symptoms. He does have MRDD and lives at a group home. Complains of frequency, urgency, and dysuria. He denies gross hematuria. He does admit to constipation, only have a BM 1-2 times per week. He consumes a large amount of bladder irritants. He also has uncontrolled DM. Most recent Hgb A1c was 8.0. He has never seen urology in the past.               Flomax daily                            Discussed bladder irritants thoroughly. Patient instructed to avoid/minimize intake of food/ drinks such as: coffee, tea, caffeine, alcohol, carbonated beverages, soda pop, spicy/acidic foods. I also explained the importance of having soft, daily bowel movements to improve his urinary symptoms. He will take MiraLAX daily. He was sent home with written instructions on how to take this medication. I did explain the importance of improved glycemic control to improve his urinary symptoms as well. He was encouraged to work toward an A1c of 7 or less. CT showed no stones or hydronephrosis     3/2021 ED              Started revatio     5/2022 PVP greenlight    9/2022 frequency every 1.5 hours and urge incontinence   Started oxybutynin 5 mg extended release    11/2022 PVR >250ml, but frequency and incontinence resolved   Stopped oxybutynin     Started Myrbetriq    Today  Here today to follow-up for overactive bladder and incomplete bladder emptying. He is here with his aid today. He is status post PVP greenlight in 5/2022. At his last visit he was started him on. He denies any incontinence. He denies nocturia. He is urinating every 2-3 hours during the day. Unfortunately, his postvoid residual is elevated 336 mL. He is having daily bowel movements.   Urine cultures have been negative.     Past Medical History:   Diagnosis Date    Hyperlipidemia     Nephrolithiasis 2007    Tobacco abuse     Type 2 diabetes mellitus ([address]      Past Surgical History:   Procedure Laterality Date    APPENDECTOMY  1978    COLONOSCOPY  3/3/15    -polyps    TONSILLECTOMY  1974    TURP  05/10/2022    Dr Erich Florez- PVP Greenlight    TURP N/A 5/10/2022    CYSTOSCOPY TRANSURETHRAL RESECTION PROSTATE LASER-PVP GREENLIGHT performed by Per Baptiste MD at [address] Encounter Medications as of 1/13/2023   Medication Sig Dispense Refill    tamsulosin (FLOMAX) 0.4 MG capsule Take 1 capsule by mouth daily 30 capsule 1    omeprazole (PRILOSEC) 20 MG delayed release capsule TAKE 1 CAPSULE BY MOUTH EVERY DAY 90 capsule 3    atorvastatin (LIPITOR) 80 MG tablet Take 1 tablet by mouth three times a week 38 tablet 3    mirabegron (MYRBETRIQ) 50 MG TB24 Take 50 mg by mouth daily 90 tablet 1    FLUoxetine (PROZAC) 10 MG capsule Take 1 capsule by mouth daily 90 capsule 3    sildenafil (REVATIO) 20 MG tablet Take 3 tablets by mouth as needed (erectile dysfunction) 30 tablet 3    JARDIANCE 10 MG tablet TAKE 1 TABLET BY MOUTH EVERY MORNING      aspirin 81 MG EC tablet Take 1 tablet by mouth daily 90 tablet 3    insulin glargine (LANTUS) 100 UNIT/ML injection vial Inject 32 Units into the skin every morning      metFORMIN (GLUCOPHAGE) 850 MG tablet Take 1 tablet by mouth 2 times daily (with meals) 180 tablet 1    polyethylene glycol (GLYCOLAX) powder Take 17 g by mouth daily      ondansetron (ZOFRAN) 4 MG tablet Take 1 tablet by mouth 3 times daily as needed for Nausea or Vomiting 30 tablet 0    Continuous Blood Gluc Sensor (FREESTYLE HELEN 2 SENSOR) MISC USE WITH FREESTYLE HELEN 2 READER TO CHECK BLOOD SUGARS 6-8 TIMES A DAY      Insulin Pen Needle (BD PEN NEEDLE ERVIN 2ND GEN) 32G X 4 MM MISC 1 each by Does not apply route daily 100 each 3    Continuous Blood Gluc  (FREESTYLE HELEN 2 READER) LEROY USE AS DIRECTED TO CHECK BLOOD SUGAR 6 TIMES DAILY      OZEMPIC, 0.25 OR 0.5 MG/DOSE, 2 MG/1.5ML SOPN INJECT 0.25 MG SUBCUTANEOUS WEEKLY FOR 4 WEEKS, THEN INCREASE TO 0.5MG (Patient not taking: Reported on 1/13/2023)      Lancets MISC 1 each by Does not apply route 2 times daily (Patient taking differently: 1 each by Does not apply route 2 times daily When freestyle isn't working then he checks his sugars) 100 each 3     No facility-administered encounter medications on file as of 1/13/2023.       Current Outpatient Medications on File Prior to Visit   Medication Sig Dispense Refill    omeprazole (PRILOSEC) 20 MG delayed release capsule TAKE 1 CAPSULE BY MOUTH EVERY DAY 90 capsule 3    atorvastatin (LIPITOR) 80 MG tablet Take 1 tablet by mouth three times a week 38 tablet 3    mirabegron (MYRBETRIQ) 50 MG TB24 Take 50 mg by mouth daily 90 tablet 1    FLUoxetine (PROZAC) 10 MG capsule Take 1 capsule by mouth daily 90 capsule 3    sildenafil (REVATIO) 20 MG tablet Take 3 tablets by mouth as needed (erectile dysfunction) 30 tablet 3    JARDIANCE 10 MG tablet TAKE 1 TABLET BY MOUTH EVERY MORNING      aspirin 81 MG EC tablet Take 1 tablet by mouth daily 90 tablet 3    insulin glargine (LANTUS) 100 UNIT/ML injection vial Inject 32 Units into the skin every morning      metFORMIN (GLUCOPHAGE) 850 MG tablet Take 1 tablet by mouth 2 times daily (with meals) 180 tablet 1    polyethylene glycol (GLYCOLAX) powder Take 17 g by mouth daily      ondansetron (ZOFRAN) 4 MG tablet Take 1 tablet by mouth 3 times daily as needed for Nausea or Vomiting 30 tablet 0    Continuous Blood Gluc Sensor (FREESTYLE HELEN 2 SENSOR) MISC USE WITH FREESTYLE HELEN 2 READER TO CHECK BLOOD SUGARS 6-8 TIMES A DAY      Insulin Pen Needle (BD PEN NEEDLE ERVIN 2ND GEN) 32G X 4 MM MISC 1 each by Does not apply route daily 100 each 3    Continuous Blood Gluc  (FREESTYLE HELEN 2 READER) LEROY USE AS DIRECTED TO CHECK BLOOD SUGAR 6 TIMES DAILY      OZEMPIC, 0. 25 OR 0.5 MG/DOSE, 2 MG/1.5ML SOPN INJECT 0.25 MG SUBCUTANEOUS WEEKLY FOR 4 WEEKS, THEN INCREASE TO 0.5MG (Patient not taking: Reported on 1/13/2023)      Lancets MISC 1 each by Does not apply route 2 times daily (Patient taking differently: 1 each by Does not apply route 2 times daily When freestyle isn't working then he checks his sugars) 100 each 3     No current facility-administered medications on file prior to visit. Patient has no known allergies. Family History   Problem Relation Age of Onset    High Blood Pressure Father      Social History     Tobacco Use   Smoking Status Every Day    Packs/day: 0.50    Years: 16.00    Pack years: 8.00    Types: Cigarettes   Smokeless Tobacco Never   Tobacco Comments    Pt states trying to quit       Social History     Substance and Sexual Activity   Alcohol Use Yes    Comment: socially       Review of Systems   Constitutional:  Negative for appetite change, chills and fever. Eyes:  Negative for redness and visual disturbance. Respiratory:  Negative for cough, shortness of breath and wheezing. Cardiovascular:  Negative for chest pain and leg swelling. Gastrointestinal:  Negative for abdominal pain, constipation, nausea and vomiting. Genitourinary:  Negative for decreased urine volume, difficulty urinating, dysuria, enuresis, flank pain, frequency, hematuria, penile discharge, penile pain, scrotal swelling, testicular pain and urgency. Musculoskeletal:  Negative for back pain, joint swelling and myalgias. Skin:  Negative for color change, rash and wound. Neurological:  Negative for dizziness, tremors and numbness. Hematological:  Negative for adenopathy. Does not bruise/bleed easily. BP (!) 156/94   Pulse 98   Ht 6' 1\" (1.854 m)   Wt 191 lb (86.6 kg)   BMI 25.20 kg/m²       PHYSICAL EXAM:  Constitutional: Patient in no acute distress; Neuro: alert and oriented to person place and time.     Psych: Mood and affect normal.  Skin: Normal  Lungs: Respiratory effort normal  Cardiovascular:  Normal peripheral pulses  Abdomen: Soft, non-tender, non-distended with no CVA, flank pain  Bladder non-tender and not distended. Lab Results   Component Value Date    BUN 17 11/28/2022     Lab Results   Component Value Date    CREATININE 0.85 11/28/2022     Lab Results   Component Value Date    PSA 1.89 04/11/2022    PSA 1.44 08/21/2019    PSA 1.50 10/08/2018       ASSESSMENT:   Diagnosis Orders   1. Incomplete bladder emptying  VT KIKA POST-VOIDING RESIDUAL URINE&/BLADDER CAP      2. BPH with obstruction/lower urinary tract symptoms  VT KIKA POST-VOIDING RESIDUAL URINE&/BLADDER CAP      3. OAB (overactive bladder)  VT KIKA POST-VOIDING RESIDUAL URINE&/BLADDER CAP            PLAN:  Continue Myrbetriq daily    Start Flomax daily    Follow-up in 6 weeks to recheck a PVR.   If PVR remains elevated we will need to repeat cystoscopy

## 2023-01-17 ENCOUNTER — HOSPITAL ENCOUNTER (OUTPATIENT)
Dept: PHYSICAL THERAPY | Age: 58
Setting detail: THERAPIES SERIES
Discharge: HOME | End: 2023-01-17
Payer: MEDICARE

## 2023-01-17 PROCEDURE — 97110 THERAPEUTIC EXERCISES: CPT

## 2023-01-17 NOTE — PROGRESS NOTES
Phone: Joya           Fax: [phone]                           Outpatient Physical Therapy                                                                            Daily Note    Patient: Juan Garcia : 1965  [facility] #: [id number]   Referring Physician: Mega Hardwick MD    Date: 2023       Treatment Diagnosis: Generalized weakness, neck pain, imbalance, difficulty in walking    Onset Date: 23  PT Insurance Information: [employer] Medicare  Total # of Visits Approved: 12 Per Physician Order  Total # of Visits to Date: 3  No Show: 0  Canceled Appointment: 0      Pre-Treatment Pain:  2/10  Subjective: Pt reports he is doing much better. With mild continued pain in the neck and left UE. Pt denies pain upon arrival this visit    Exercises:  Exercise 2: SciFit Level 2.5 8 minutes  Exercise 3: Upper trapezius stretch / levator stretch 2 x 30 seconds bilateral  Exercise 4: Lateral pulldown YTB 3 x 10  Exercise 6: Toe taps 6 inch step 2 x 10 bilateral no UE support  Exercise 8: Step ups 6 inch step 1 x 10 bilateral  Exercise 11: cervical stability with AROM UE flexion / scaption 2 x 10 each  Exercise 12: row with BTB 3 x 10  Exercise 13: wall slide with pillow case 20x  Exercise 14: Horizontal Abduction YTB 2 x 10  Exercise 15: shoulder blade squeeze 3 x 10      Assessment  Assessment: Pt arrived to treatment with reports of no pain but continued numbess in the LUE. Therapist progressed pt this date with exercises in order to factilitate cervical stability and functional range of motion. Pt directed through treatment in order to facilitate lower extremity strengthening and improved balance. Pt continued to show good improvement overall. Denied pain throughout treatment. Continue per tolerance.      Activity Tolerance  Activity Tolerance: Patient tolerated treatment well    Patient Education  Pt educated on importance of continued mobility    Pt verbalized/demonstrated good understanding:     [x] Yes         [] No, pt required further clarification.       Post Treatment Pain:  0/10      Plan  Plan Frequency: 2x per week  Plan weeks: 6 weeks       Goals  (Total # of Visits to Date: 3)      Short Term Goals  Time Frame for Short Term Goals: 3 weeks  Short Term Goal 1: Pt will be initiated and compliant with HEP for pain management and balance - MET  Short Term Goal 2: Pt will report pain with upper extremity movement as less than 8/10 in order to improve quality of life - met (1/17/2023 pt report 0/10 pain)  Short Term Goal 3: Pt will ambulate 20 feet with no assistive device without loss of balance in order to improve independence in ambulation - met (1/13/2023 400 feet SBA by therapist, some instability)    Long Term Goals  Time Frame for Long Term Goals : 6 weeks  Long Term Goal 1: Pt will report pain with upper extremity movement as less than 4/10 in order to improve ability to complete household chores.  Long Term Goal 2: Pt will improve cervical rotation to at least 70 degrees bilateral pain free in order to improve ability to look over shoulder while walking his dog  Long Term Goal 3: Pt will be independent in ambulation without assistive device for 30 minutes of standing activity in order to improve safety with community ambulation.  Long Term Goal 4: Pt will improve lower extremity strength to at least 4+/5 overall bilateral in order to improve stability in standing and ambulation.  Long Term Goal 5: Pt will improve tinetti balance and gait assessment from 17 to greater than 22 in order to reduce fall risk    Minutes Tracking:  Time In: 1100  Time Out: 1143  Minutes: 43  Timed Code Treatment Minutes: 42 Minutes        Altagracia Ayon PT, DPT     Date: 1/17/2023

## 2023-01-20 ENCOUNTER — HOSPITAL ENCOUNTER (OUTPATIENT)
Dept: PHYSICAL THERAPY | Age: 58
Setting detail: THERAPIES SERIES
Discharge: HOME | End: 2023-01-20
Payer: MEDICARE

## 2023-01-20 PROCEDURE — 97110 THERAPEUTIC EXERCISES: CPT

## 2023-01-20 NOTE — PROGRESS NOTES
Phone: [phone]                 [facility]           Fax: [phone]                           Outpatient Physical Therapy                                                                            Daily Note    Patient: Volodymyr Riddle : 1965  [facility] #: [id number]   Referring Physician: Mojgan Garcia MD    Date: 2023       Treatment Diagnosis: Generalized weakness, neck pain, imbalance, difficulty in walking    Onset Date: 23  PT Insurance Information: [facility] Medicare  Total # of Visits Approved: 12 Per Physician Order  Total # of Visits to Date: 4  No Show: 0  Canceled Appointment: 0      Pre-Treatment Pain:  0/10  Subjective: Pt states he has no pain and feels his walking has improved. Pt states he went for a 3 hour walk yesterday with no issues. Exercises:  Exercise 2: SciFit Level 4.0 10 minutes  Exercise 4: Lateral pulldown Purple TB 3 x 10  Exercise 5: Standing march / standing abduction / knee flexion 3 x 10 bilateral on airex pad  Exercise 6: Toe taps 6 inch step 2 x 10 bilateral no UE support  Exercise 8: Step ups 6 inch step 1 x 10 bilateral  Exercise 9: sit to stand with 10 pound press up  Exercise 11: cervical stability with AROM UE flexion / scaption 2 x 10 each 2 pound weights  Exercise 12: row with purple theraband 3 x 10  Exercise 14: Horizontal Abduction YTB 2 x 10 YTB  Exercise 15: shoulder blade squeeze 3 x 10  Exercise 16: 4 pound shelf taps 1 x 10 bilateral  Exercise 17: rocker board 20x    Objective Measures:    Cervical Rotation AROM:   Right: 71 degrees  Left: 74 degrees    Assessment  Assessment: Pt arrived to treatment without report of pain and reports of improved walking tolerance. Therapist progressed pt through treatment in order to facilitate improved dynamic balance and upper extremity strength. Pt reported mild dizziness with sit to stands that was resolved with rest. Therapist provided verbal cuing for form and to remain on task.  Pt continuing to improve with cervical rotation, though pt continues to report stiffness. Pt ended treatment without pain. Activity Tolerance  Activity Tolerance: Patient tolerated treatment well    Patient Education  Patient Education: Pt educated on continuing stretches at home to prevent neck stiffness  Pt verbalized/demonstrated good understanding:     [x] Yes         [] No, pt required further clarification. Post Treatment Pain:  0/10      Plan  Plan Frequency: 2x per week  Plan weeks: 6 weeks       Goals  (Total # of Visits to Date: 4)      Short Term Goals  Time Frame for Short Term Goals: 3 weeks  Short Term Goal 1: Pt will be initiated and compliant with HEP for pain management and balance - MET  Short Term Goal 2: Pt will report pain with upper extremity movement as less than 8/10 in order to improve quality of life - met (1/17/2023 pt report 0/10 pain)  Short Term Goal 3: Pt will ambulate 20 feet with no assistive device without loss of balance in order to improve independence in ambulation - met (1/13/2023 400 feet SBA by therapist, some instability)    Long Term Goals  Time Frame for Long Term Goals : 6 weeks  Long Term Goal 1: Pt will report pain with upper extremity movement as less than 4/10 in order to improve ability to complete household chores. Long Term Goal 2: Pt will improve cervical rotation to at least 70 degrees bilateral pain free in order to improve ability to look over shoulder while walking his dog - met (1/20/2023 right: 71; left: 74)  Long Term Goal 3: Pt will be independent in ambulation without assistive device for 30 minutes of standing activity in order to improve safety with community ambulation. Long Term Goal 4: Pt will improve lower extremity strength to at least 4+/5 overall bilateral in order to improve stability in standing and ambulation.   Long Term Goal 5: Pt will improve tinetti balance and gait assessment from 17 to greater than 22 in order to reduce fall risk    Minutes Tracking:  Time In: 1046  Time Out: Fah[address] Sun  Minutes: 41  Timed Code Treatment Minutes: [address], DPT      Date: 1/20/2023

## 2023-01-24 ENCOUNTER — HOSPITAL ENCOUNTER (OUTPATIENT)
Dept: PHYSICAL THERAPY | Age: 58
Setting detail: THERAPIES SERIES
Discharge: HOME | End: 2023-01-24
Payer: MEDICARE

## 2023-01-24 DIAGNOSIS — S16.1XXS STRAIN OF NECK MUSCLE, SEQUELA: Primary | ICD-10-CM

## 2023-01-24 DIAGNOSIS — R26.89 BALANCE PROBLEM: ICD-10-CM

## 2023-01-24 PROCEDURE — 97110 THERAPEUTIC EXERCISES: CPT

## 2023-01-24 PROCEDURE — 97530 THERAPEUTIC ACTIVITIES: CPT

## 2023-01-24 NOTE — PROGRESS NOTES
Phone: Joya           Fax: [phone]                           Outpatient Physical Therapy                                                                            Daily Note    Patient: Brent Hand : 1965  [facility] #: [id number]   Referring Physician: Fay King MD    Date: 2023       Treatment Diagnosis: Generalized weakness, neck pain, imbalance, difficulty in walking    Onset Date: 23  PT Insurance Information: [facility] Medicare  Total # of Visits Approved: 12 Per Physician Order  Total # of Visits to Date: 5  No Show: 0  Canceled Appointment: 0      Pre-Treatment Pain:  1/10  Subjective: Pt reports his pain is getting better. Continued numbness in the neck and lower extremity    Exercises:  Exercise 2: SciFit Level 5.0 10 minutes  Exercise 3: Upper trapezius stretch / levator stretch 2 x 30 seconds bilateral  Exercise 4: Lateral pulldown Purple TB 3 x 10  Exercise 5: Standing march / standing abduction / knee flexion 3 x 10 bilateral on airex pad  Exercise 6: Toe taps 6 inch step 2 x 10 bilateral no UE support; 2 x 10 12 inch step 1 UE support  Exercise 7: thoracic extension 3 x 10  Exercise 8: cervical extension 1 x 20  Exercise 11: cervical stability with AROM UE flexion 1 x 15  Exercise 12: row with purple theraband 3 x 10  Exercise 15: shoulder blade squeeze 2 x 10  Exercise 17: prone prop 5 minutes ; prone elbow press up 2 x 10    Objective measure:   Tinetti Balance and Gait Assessment:    Balance score: 15/16   Gait score:    Total:  - low fall risk     LE strength:    Hip Flexion - right: 5-/5; left: 5-/5   Hip abduction - Right: 4+/5; Left: 4/5   Knee extension - Right: 5/5; Left: 5-/5   Knee flexion - Right: 5-/5; Left: 4+/5    Assessment  Assessment: Pt arrived to treatment with reports of improved pain and walking.  Pt continues to have reports of numbness and tingling in left upper extremity and left lower extremity with moderate exercise. Pt had good response with extension exercises to reduce lower extremity numbness. Therapist provided verbal cuing and visual demonstration with new exercises for form. HEP updated and printout provided to pt. Pt progressing well towards goals meeting all short term goals set by this therapist so far. Pt to benefit from continued skilled outpatient physical therapy 2x per week for 4 weeks (8 addditional visits) in order to reduce numbness and tingling symptoms in upper and lower extremities and continue to progress dynamic balance as needed for return to prior level of function. Activity Tolerance  Activity Tolerance: Patient tolerated treatment well    Patient Education  Patient Education: Pt educated on HEP update  Pt verbalized/demonstrated good understanding:     [x] Yes         [] No, pt required further clarification. Post Treatment Pain:  0/10      Plan  Plan Frequency: 2x per week  Plan weeks: 6 weeks       Goals  (Total # of Visits to Date: 5)      Short Term Goals  Time Frame for Short Term Goals: 3 weeks  Short Term Goal 1: Pt will be initiated and compliant with HEP for pain management and balance - MET  Short Term Goal 2: Pt will report pain with upper extremity movement as less than 8/10 in order to improve quality of life - met (1/24/2023 pt report 0/10 pain)  Short Term Goal 3: Pt will ambulate 20 feet with no assistive device without loss of balance in order to improve independence in ambulation - met (1/13/2023 400 feet SBA by therapist, some instability)    Long Term Goals  Time Frame for Long Term Goals : 6 weeks  Long Term Goal 1: Pt will report pain with upper extremity movement as less than 4/10 in order to improve ability to complete household chores.  - progressing (1/24/2023 5/10 pain)  Long Term Goal 2: Pt will improve cervical rotation to at least 70 degrees bilateral pain free in order to improve ability to look over shoulder while walking his dog - met (1/20/2023 right: 71; left: 74)  Long Term Goal 3: Pt will be independent in ambulation without assistive device for 30 minutes of standing activity in order to improve safety with community ambulation. - progressing (1/24/2023 25 minutes with only report of numbness)  Long Term Goal 4: Pt will improve lower extremity strength to at least 4+/5 overall bilateral in order to improve stability in standing and ambulation.  - progressing (1/24/2023 4+/5 overall with exception of left hip abduction 4/5)  Long Term Goal 5: Pt will improve tinetti balance and gait assessment from 17 to greater than 22 in order to reduce fall risk - met (1/24/2023 score: 27)    Minutes Tracking:  Time In: 0958  Time Out: [address]  Minutes: 45  Timed Code Treatment Minutes: [address] [facility] [place], D[facility]     Date: 1/24/2023

## 2023-01-24 NOTE — PLAN OF CARE
[facility]           Phone: [phone]             Outpatient Physical Therapy  Fax: [phone]                                           Date: 2023  Patient: Radha Vu : 1965 [facility] #: [id number]   Referring Physician: Merry Cunningham MD      [] Plan of Care   [x] Updated Plan of Care    Dates of Service to Include: 2023 to 23    Diagnosis:  S16. 1XXS - Strain of neck muscle, sequela ; R26.89 - Balance problem    Rehab (Treatment) Diagnosis:  Generalized weakness, neck pain, imbalance, difficulty in walking             Onset Date:  23    Attendance  Total # of Visits to Date: 5 No Show: 0 Canceled Appointment: 0    Assessment  Assessment: Pt arrived to treatment with reports of improved pain and walking. Pt continues to have reports of numbness and tingling in left upper extremity and left lower extremity with moderate exercise. Pt had good response with extension exercises to reduce lower extremity numbness. Therapist provided verbal cuing and visual demonstration with new exercises for form. HEP updated and printout provided to pt. Pt progressing well towards goals meeting all short term goals set by this therapist so far. Pt to benefit from continued skilled outpatient physical therapy 2x per week for 4 weeks (8 additional visits) in order to reduce numbness and tingling symptoms in upper and lower extremities and continue to progress dynamic balance as needed for return to prior level of function.     Objective measures:   Objective measure:   Tinetti Balance and Gait Assessment:               Balance score: 1516              Gait score:               Total:  - low fall risk      LE strength:               Hip Flexion - right: 5-/5; left: 5-/5              Hip abduction - Right: 4+/5; Left: 4/5              Knee extension - Right: 5/5; Left: 5-/5              Knee flexion - Right: 5-/5; Left: 4+/5    Goals  Short Term Goals  Time Frame for Short Term Goals: 3 weeks  Short Term Goal 1: Pt will be initiated and compliant with HEP for pain management and balance - MET  Short Term Goal 2: Pt will report pain with upper extremity movement as less than 8/10 in order to improve quality of life - met (1/24/2023 pt report 0/10 pain)  Short Term Goal 3: Pt will ambulate 20 feet with no assistive device without loss of balance in order to improve independence in ambulation - met (1/13/2023 400 feet SBA by therapist, some instability)  Long Term Goals  Time Frame for Long Term Goals : 6 weeks  Long Term Goal 1: Pt will report pain with upper extremity movement as less than 4/10 in order to improve ability to complete household chores. - progressing (1/24/2023 5/10 pain)  Long Term Goal 2: Pt will improve cervical rotation to at least 70 degrees bilateral pain free in order to improve ability to look over shoulder while walking his dog - met (1/20/2023 right: 71; left: 74)  Long Term Goal 3: Pt will be independent in ambulation without assistive device for 30 minutes of standing activity in order to improve safety with community ambulation. - progressing (1/24/2023 25 minutes with only report of numbness)  Long Term Goal 4: Pt will improve lower extremity strength to at least 4+/5 overall bilateral in order to improve stability in standing and ambulation.  - progressing (1/24/2023 4+/5 overall with exception of left hip abduction 4/5)  Long Term Goal 5: Pt will improve tinetti balance and gait assessment from 17 to greater than 22 in order to reduce fall risk - met (1/24/2023 score: 27)     Prognosis  Therapy Prognosis: Good    Treatment Plan   Plan Frequency: 2x per week  Plan weeks: 6 weeks  [x] HP/CP      [] Electrical Stim   [x] Therapeutic Exercise      [x] Gait Training  [] Aquatics   [] Ultrasound         [x] Patient Education/HEP   [x] Manual Therapy  [x] Traction    [x] Neuro-pako [] Soft Tissue Mobs            [] Home TENS  [] Iontophoresis    [] Orthotic casting/fitting      [] Dry Needling  [] Blood Flow Restriction             Electronically signed by: Razia Parker PT DPFRANCES    Date: 1/24/2023      ______________________________________ Date: 1/24/2023   Physician Signature

## 2023-01-27 ENCOUNTER — HOSPITAL ENCOUNTER (OUTPATIENT)
Dept: PHYSICAL THERAPY | Age: 58
Setting detail: THERAPIES SERIES
Discharge: HOME | End: 2023-01-27
Payer: MEDICARE

## 2023-01-27 PROBLEM — M54.12 LEFT CERVICAL RADICULOPATHY: Status: ACTIVE | Noted: 2023-01-27

## 2023-01-27 PROCEDURE — 97110 THERAPEUTIC EXERCISES: CPT

## 2023-01-27 NOTE — PROGRESS NOTES
Phone: [facility]           Fax: [phone]                           Outpatient Physical Therapy                                                                            Daily Note    Patient: Nellie Granda : 1965  CSN #: [id number]   Referring Physician: Gayle Stewart MD    Date: 2023     Treatment Diagnosis: Generalized weakness, neck pain, imbalance, difficulty in walking    Onset Date: 23  PT Insurance Information: [facility] Medicare  Total # of Visits Approved: 12 Per Physician Order  Total # of Visits to Date: 6  No Show: 0  Canceled Appointment: 0    Pre-Treatment Pain:  0/10  Subjective: Pt reports he saw the Dr this morning and he will be getting a MRI this afternoon (appt says 2-6) but he wants to figure out whats up with his neck. Pt reports no pain just more numbness than anything. Exercises:  Exercise 1: HEP: Tandem standing, standing march, side to side weight shift, staggered stance forward / backward weight shift, sit to stand  Exercise 2: SciFit Level 5.0 10 minutes  Exercise 3: Upper trapezius stretch / levator stretch 2 x 30 seconds bilateral  Exercise 5: Standing march / standing abduction / knee flexion 3 x 10 bilateral on airex pad  Exercise 6: Toe taps 6 inch step 2 x 10 bilateral no UE support; 2 x 10 12 inch step 1 UE support  Exercise 7: thoracic extension 3 x 10  Exercise 8: cervical extension 1 x 20  Exercise 9: sit to stand with 10 pound press up  Exercise 10: side stepping 6 laps at counter  Exercise 12: row with purple theraband 3 x 10  Exercise 14: Horizontal Abduction YTB 2 x 10 YTB  Exercise 16: UBE 6 mins    Assessment  Assessment: Progressed dynamic balance exercises today with no increased pain noted. Educated Pt on posture control with hobbies with fair understanding noted. Activity Tolerance  Activity Tolerance: Patient tolerated treatment well    Patient Education  Patient Education: Continue HEP.   Pt verbalized/demonstrated good understanding:     [x] Yes         [] No, pt required further clarification. Post Treatment Pain:  0/10    Plan  Plan Frequency: 2x per week  Plan weeks: 6 weeks     Goals  (Total # of Visits to Date: 6)      Short Term Goals  Time Frame for Short Term Goals: 3 weeks  Short Term Goal 1: Pt will be initiated and compliant with HEP for pain management and balance - MET  Short Term Goal 2: Pt will report pain with upper extremity movement as less than 8/10 in order to improve quality of life - met (1/24/2023 pt report 0/10 pain)  Short Term Goal 3: Pt will ambulate 20 feet with no assistive device without loss of balance in order to improve independence in ambulation - met (1/13/2023 400 feet SBA by therapist, some instability)    Long Term Goals  Time Frame for Long Term Goals : 6 weeks  Long Term Goal 1: Pt will report pain with upper extremity movement as less than 4/10 in order to improve ability to complete household chores. - progressing (1/24/2023 5/10 pain)  Long Term Goal 2: Pt will improve cervical rotation to at least 70 degrees bilateral pain free in order to improve ability to look over shoulder while walking his dog - met (1/20/2023 right: 71; left: 74)  Long Term Goal 3: Pt will be independent in ambulation without assistive device for 30 minutes of standing activity in order to improve safety with community ambulation. - progressing (1/24/2023 25 minutes with only report of numbness)  Long Term Goal 4: Pt will improve lower extremity strength to at least 4+/5 overall bilateral in order to improve stability in standing and ambulation.  - progressing (1/24/2023 4+/5 overall with exception of left hip abduction 4/5)  Long Term Goal 5: Pt will improve tinetti balance and gait assessment from 17 to greater than 22 in order to reduce fall risk - met (1/24/2023 score: 27)    Minutes Tracking:  Time In: 1104  Time Out: 1148  Minutes: 44  Timed Code Treatment Minutes: 42 Minutes    Arash Escobar[place], [place]     Date: 1/27/2023

## 2023-01-31 ENCOUNTER — HOSPITAL ENCOUNTER (OUTPATIENT)
Dept: PHYSICAL THERAPY | Age: 58
Setting detail: THERAPIES SERIES
Discharge: HOME | End: 2023-01-31
Payer: MEDICARE

## 2023-01-31 PROCEDURE — 97530 THERAPEUTIC ACTIVITIES: CPT

## 2023-01-31 PROCEDURE — 97110 THERAPEUTIC EXERCISES: CPT

## 2023-01-31 NOTE — PROGRESS NOTES
Phone: [facility]           Fax: [phone]                           Outpatient Physical Therapy                                                                            Daily Note    Patient: Yaneth Gibson : 1965  CSN #: [id number]   Referring Physician: Itzel Pritchard MD    Date: 2023    Diagnosis: S16. 1XXS - Strain of neck muscle, sequela ; R26.89 - Balance problem  Treatment Diagnosis: Generalized weakness, neck pain, imbalance, difficulty in walking    Onset Date: 23  PT Insurance Information: [employer] Medicare  Total # of Visits Approved: 12 Per Physician Order  Total # of Visits to Date: 7  No Show: 0  Canceled Appointment: 0      Pre-Treatment Pain:  0/10  Subjective: Pt arrives reporting that he had right shoulder pain yesterday that has since gone away. Pt continues to report left UE numbness, denies any pain this date. Exercises:  Exercise 2: SciFit Level 5.5 10 minutes  Exercise 4: Lateral pulldown blue TB 3 x 10  Exercise 7: thoracic extension 3 x 10  Exercise 8: cervical extension 1 x 20; rotation 2 x 10 bilateral  Exercise 9: sit to stand with 10 pound press up 1 x 10  Exercise 12: row with purple theraband 3 x 10  Exercise 14: Horizontal Abduction YTB 2 x 10 YTB  Exercise 16: UBE 4 mins retro  Exercise 18: Lunge on bosu ball 1 x 10 bilateral  Exercise 19: farmers carry 15 pound kettle bell 4 laps per hand    Objective measure:   Cervical rotation: Right - 71 degrees; Left - 78 degrees     Assessment  Assessment: Pt continues to have left upper extremity numbness but denies pain. Therapist progressed pt with upper extremity strengthening and cervical mobility exercises. Verbal cuing provided for posture and technique. Continued with balance exercises with good pt tolerance and progress in stability.     Activity Tolerance  Activity Tolerance: Patient tolerated treatment well    Patient Education  Patient Education: Pt educated on cervical anatomy and centralization of symptoms  Pt verbalized/demonstrated good understanding:     [x] Yes         [] No, pt required further clarification. Post Treatment Pain:  0/10      Plan  Plan Frequency: 2x per week  Plan weeks: 6 weeks       Goals  (Total # of Visits to Date: 7)      Short Term Goals  Time Frame for Short Term Goals: 3 weeks  Short Term Goal 1: Pt will be initiated and compliant with HEP for pain management and balance - MET  Short Term Goal 2: Pt will report pain with upper extremity movement as less than 8/10 in order to improve quality of life - met (1/24/2023 pt report 0/10 pain)  Short Term Goal 3: Pt will ambulate 20 feet with no assistive device without loss of balance in order to improve independence in ambulation - met (1/13/2023 400 feet SBA by therapist, some instability)    Long Term Goals  Time Frame for Long Term Goals : 6 weeks  Long Term Goal 1: Pt will report pain with upper extremity movement as less than 4/10 in order to improve ability to complete household chores. - progressing (1/24/2023 5/10 pain)  Long Term Goal 2: Pt will improve cervical rotation to at least 70 degrees bilateral pain free in order to improve ability to look over shoulder while walking his dog - met (1/20/2023 right: 71; left: 74)  Long Term Goal 3: Pt will be independent in ambulation without assistive device for 30 minutes of standing activity in order to improve safety with community ambulation. - progressing (1/24/2023 25 minutes with only report of numbness)  Long Term Goal 4: Pt will improve lower extremity strength to at least 4+/5 overall bilateral in order to improve stability in standing and ambulation.  - progressing (1/24/2023 4+/5 overall with exception of left hip abduction 4/5)  Long Term Goal 5: Pt will improve tinetti balance and gait assessment from 17 to greater than 22 in order to reduce fall risk - met (1/24/2023 score: 27)    Minutes Tracking:  Time In: 1003  Time Out: [address]  Minutes: 42  Timed Code Treatment Minutes: [address] [facility], [address] [facility], DPT     Date: 1/31/2023

## 2023-02-03 ENCOUNTER — HOSPITAL ENCOUNTER (OUTPATIENT)
Dept: PHYSICAL THERAPY | Age: 58
Setting detail: THERAPIES SERIES
Discharge: HOME | End: 2023-02-03
Payer: MEDICARE

## 2023-02-03 PROCEDURE — 97110 THERAPEUTIC EXERCISES: CPT

## 2023-02-03 NOTE — PROGRESS NOTES
Phone: [facility]a           Fax: [phone]                           Outpatient Physical Therapy                                                                            Daily Note    Patient: Severo Bear : 1965  CSN #: [id number]   Referring Physician: Maryana Gonzalez MD    Date: 2/3/2023    Diagnosis: S16. 1XXS - Strain of neck muscle, sequela ; R26.89 - Balance problem  Treatment Diagnosis: Generalized weakness, neck pain, imbalance, difficulty in walking    Onset Date: 23  PT Insurance Information: [facility] Medicare  Total # of Visits Approved: 12 Per Physician Order  Total # of Visits to Date: 8  No Show: 0  Canceled Appointment: 0      Pre-Treatment Pain:  2/10  Subjective: Pt arrives with mild pain in the posterior left shoulder. Continued numbness in left UE and right LE    Exercises:  Exercise 2: SciFit Level 5.5 10 minutes  Exercise 4: Lateral pulldown purple TB 3 x 10  Exercise 7: thoracic extension 3 x 10  Exercise 8: cervical extension 1 x 20; rotation 2 x 10 bilateral  Exercise 12: row with purple theraband 3 x 10  Exercise 14: Horizontal Abduction YTB32 x 10 YTB  Exercise 15: shoulder blade squeeze 3 x 10  Exercise 16: UBE 4 mins retro; 2 min fwd; 2 min back  Exercise 19: farmers carry 20 pound kettle bell 4 laps per hand        Assessment  Assessment: Pt directed through treatment in order to facilitate upper extremity strength and cervical mobility. Therapist provided verbal cuing for form. Pt completed treatment without any report of pain, continued numbness. Pt to  get MRI on 2023. Activity Tolerance  Activity Tolerance: Patient tolerated treatment well    Patient Education  Patient Education: Pt educated on continuing HEP  Pt verbalized/demonstrated good understanding:     [x] Yes         [] No, pt required further clarification.        Post Treatment Pain:  0/10      Plan  Plan Frequency: 2x per week  Plan weeks: 6 weeks       Goals (Total # of Visits to Date: 8)      Short Term Goals  Time Frame for Short Term Goals: 3 weeks  Short Term Goal 1: Pt will be initiated and compliant with HEP for pain management and balance - MET  Short Term Goal 2: Pt will report pain with upper extremity movement as less than 8/10 in order to improve quality of life - met (1/24/2023 pt report 0/10 pain)  Short Term Goal 3: Pt will ambulate 20 feet with no assistive device without loss of balance in order to improve independence in ambulation - met (1/13/2023 400 feet SBA by therapist, some instability)    Long Term Goals  Time Frame for Long Term Goals : 6 weeks  Long Term Goal 1: Pt will report pain with upper extremity movement as less than 4/10 in order to improve ability to complete household chores. - progressing (1/24/2023 5/10 pain)  Long Term Goal 2: Pt will improve cervical rotation to at least 70 degrees bilateral pain free in order to improve ability to look over shoulder while walking his dog - met (1/20/2023 right: 71; left: 74)  Long Term Goal 3: Pt will be independent in ambulation without assistive device for 30 minutes of standing activity in order to improve safety with community ambulation. - met (2/3//2023 )  Long Term Goal 4: Pt will improve lower extremity strength to at least 4+/5 overall bilateral in order to improve stability in standing and ambulation.  - progressing (1/24/2023 4+/5 overall with exception of left hip abduction 4/5)  Long Term Goal 5: Pt will improve tinetti balance and gait assessment from 17 to greater than 22 in order to reduce fall risk - met (1/24/2023 score: 27)    Minutes Tracking:  Time In: 1003  Time Out: [employer]56 [employer]  Minutes: 41  Timed Code Treatment Minutes: [address], [place], DPT     Date: 2/3/2023

## 2023-02-06 ENCOUNTER — HOSPITAL ENCOUNTER (OUTPATIENT)
Dept: MRI IMAGING | Age: 58
Discharge: HOME | End: 2023-02-08
Payer: MEDICARE

## 2023-02-06 DIAGNOSIS — M54.12 LEFT CERVICAL RADICULOPATHY: ICD-10-CM

## 2023-02-06 PROCEDURE — 72141 MRI NECK SPINE W/O DYE: CPT

## 2023-02-07 ENCOUNTER — HOSPITAL ENCOUNTER (OUTPATIENT)
Dept: PHYSICAL THERAPY | Age: 58
Setting detail: THERAPIES SERIES
Discharge: HOME | End: 2023-02-07
Payer: MEDICARE

## 2023-02-07 PROCEDURE — 97110 THERAPEUTIC EXERCISES: CPT

## 2023-02-07 NOTE — PROGRESS NOTES
Phone: [facility]a           Fax: [phone]                           Outpatient Physical Therapy                                                                            Daily Note    Patient: Jaye Avendano : 1965  CSN #: [id number]   Referring Physician: Aga Martinez MD    Date: 2023    Diagnosis: S16. 1XXS - Strain of neck muscle, sequela ; R26.89 - Balance problem  Treatment Diagnosis: Generalized weakness, neck pain, imbalance, difficulty in walking    Onset Date: 23  PT Insurance Information: BC[employer] Medicare  Total # of Visits Approved: 12 Per Physician Order  Total # of Visits to Date: 9  No Show: 0  Canceled Appointment: 0      Pre-Treatment Pain:  1/10  Subjective: Pt arrives with normal report of pain in left shoulder and neck. Pt states he got an MRI of the neck done yesterday. Exercises:  Exercise 2: SciFit Level 5 10 minutes  Exercise 3: Upper trapezius stretch / levator stretch 2 x 30 seconds bilateral Left side only  Exercise 4: Lateral pulldown purple TB 3 x 10  Exercise 7: thoracic extension 3 x 10  Exercise 8: cervical extension 1 x 20; rotation 2 x 10 bilateral  Exercise 9: sit to stand 1 x 10  Exercise 11: cervical stability with AROM UE flexion 1 x 15  Exercise 12: row with purple theraband 3 x 10  Exercise 13: open books 1 x 10 bilateral  Exercise 14: Horizontal Abduction BTB 2 x 10  Exercise 15: shoulder blade squeeze 3 x 10  Exercise 17: prone prop 5 minutes ; prone elbow press up 2 x 10  Exercise 19: farmers carry 20 pound kettle bell 4 laps per hand    Objective Measures:  Tinetti: 28/28    Cervical rotation (following stretching): Right - 72 degrees ; Left - 75 degrees    Modalities:   10 minutes - heat pack to cervical spine for reduction in stiffness and tissue extensibility     Assessment  Assessment: Pt arrived to treatment with reports of feeling better but continues to have numbness in the left upper extrmeity. Therapist able to progress pt this date with increased repetitions and resistance with exercises. Pt progressing well towards goals, but will benefit from coninuted outpatient physical therapy in order to reduce radicular symptoms as needed to return to prior level of function. Please see objective measures and updated goals below for pt status to date. Activity Tolerance  Activity Tolerance: Patient tolerated treatment well    Patient Education  Patient Education: Pt educated on continuing HEP for reducing soreness and plan of care moving foward. Pt verbalized/demonstrated good understanding:     [x] Yes         [] No, pt required further clarification. Post Treatment Pain:  0/10      Plan  Plan Frequency: 2x per week  Plan weeks: 6 weeks       Goals  (Total # of Visits to Date: 5)      Short Term Goals  Time Frame for Short Term Goals: 3 weeks  Short Term Goal 1: Pt will be initiated and compliant with HEP for pain management and balance - MET  Short Term Goal 2: Pt will report pain with upper extremity movement as less than 8/10 in order to improve quality of life - met (1/24/2023 pt report 0/10 pain)  Short Term Goal 3: Pt will ambulate 20 feet with no assistive device without loss of balance in order to improve independence in ambulation - met (1/13/2023 400 feet SBA by therapist, some instability)    Long Term Goals  Time Frame for Long Term Goals : 6 weeks  Long Term Goal 1: Pt will report pain with upper extremity movement as less than 4/10 in order to improve ability to complete household chores.  - met (2/7/2023 pt denied pain throughout, continued numbness)  Long Term Goal 2: Pt will improve cervical rotation to at least 70 degrees bilateral pain free in order to improve ability to look over shoulder while walking his dog - met (2/7/2023 right: 72; left: 75)  Long Term Goal 3: Pt will be independent in ambulation without assistive device for 30 minutes of standing activity in order to improve safety with community ambulation. - met (2/3/2023 )  Long Term Goal 4: Pt will improve lower extremity strength to at least 4+/5 overall bilateral in order to improve stability in standing and ambulation.  - progressing (2/7/2023 4+/5 overall with exception of left hip abduction 4/5)  Long Term Goal 5: Pt will improve tinetti balance and gait assessment from 17 to greater than 22 in order to reduce fall risk - met (2/7/2023 score: 28)    Minutes Tracking:  Time In: 1016  Time Out: 1101  Minutes: 45  Timed Code Treatment Minutes: Hossein Haile, DPT     Date: 2/7/2023

## 2023-02-08 RX ORDER — TAMSULOSIN HYDROCHLORIDE 0.4 MG/1
CAPSULE ORAL
Qty: 30 CAPSULE | Refills: 1 | Status: SHIPPED | OUTPATIENT
Start: 2023-02-08

## 2023-02-08 NOTE — TELEPHONE ENCOUNTER
Flomax resumed last month we will give refills. Patient does have an upcoming appointment for follow-up.

## 2023-02-10 ENCOUNTER — HOSPITAL ENCOUNTER (OUTPATIENT)
Dept: PHYSICAL THERAPY | Age: 58
Setting detail: THERAPIES SERIES
Discharge: HOME | End: 2023-02-10
Payer: MEDICARE

## 2023-02-10 NOTE — PROGRESS NOTES
[facility]  Inpatient/Observation/Outpatient Rehabilitation    Date: 2/10/2023  Patient Name: Dayanara Potter       [] Inpatient Acute/Observation       [x]  Outpatient  : 1965     [x] Pt cancelled due to:     Called Pt and cancelled todays appointment d/t waiting on insurance approval.  Reminded Pt mother of next appt Tuesday.        Elise Storey PTA Date: 2/10/2023

## 2023-02-14 ENCOUNTER — HOSPITAL ENCOUNTER (OUTPATIENT)
Dept: PHYSICAL THERAPY | Age: 58
Setting detail: THERAPIES SERIES
Discharge: HOME | End: 2023-02-14
Payer: MEDICARE

## 2023-02-14 NOTE — PROGRESS NOTES
Phone: [phone]                 [facility]           Fax: [phone]                           Outpatient Physical Therapy                                                                            Daily Note    Patient: Alfonso Bonds : 1965  CSN #: [id number]   Referring Physician: Leo Roman MD    Date: 2023     Treatment Diagnosis: Generalized weakness, neck pain, imbalance, difficulty in walking    Onset Date: 23  PT Insurance Information: [facility] Medicare  Total # of Visits Approved: 12 Per Physician Order  Total # of Visits to Date: 10  No Show: 0  Canceled Appointment: 0    Pre-Treatment Pain:  1/10  Subjective: Pt states he feels much better in regards to the walking but his neck is still really bothering him. Pt states he is suppose to follow-up with pain management tomorrow. Exercises:  Exercise 2: SciFit Level 5 10 minutes  Exercise 9: sit to stand 1 x 10  Exercise 20: -measurements for dc    Assessment  Assessment: No charge visit complete d/t insurance denial from Pt \"doing to well and meeting functional goals\". Pt able to amb 1050 feet in 6 mins today and able to sit<>stand 10x in 30 seconds with no UE assist.  Pt displays 75% of flexion AROM, full ABD AROM, ER thumb to base of skull and IR behind back thumb to t12. Pt biggest restriction continues to be \"pinched nerve in neck\". Activity Tolerance  Activity Tolerance: Patient tolerated treatment well    Patient Education  Patient Education: Reviewed HEP for dc  Pt verbalized/demonstrated good understanding:     [x] Yes         [] No, pt required further clarification.      Post Treatment Pain:  1/10    Plan  Plan Frequency: 2x per week  Plan weeks: 6 weeks     Goals  (Total # of Visits to Date: 8)      Short Term Goals  Time Frame for Short Term Goals: 3 weeks  Short Term Goal 1: Pt will be initiated and compliant with HEP for pain management and balance - MET  Short Term Goal 2: Pt will report pain with upper extremity movement as less than 8/10 in order to improve quality of life - met (1/24/2023 pt report 0/10 pain)  Short Term Goal 3: Pt will ambulate 20 feet with no assistive device without loss of balance in order to improve independence in ambulation - met (1/13/2023 400 feet SBA by therapist, some instability)    Long Term Goals  Time Frame for Long Term Goals : 6 weeks  Long Term Goal 1: Pt will report pain with upper extremity movement as less than 4/10 in order to improve ability to complete household chores. - met (2/7/2023 pt denied pain throughout, continued numbness)  Long Term Goal 2: Pt will improve cervical rotation to at least 70 degrees bilateral pain free in order to improve ability to look over shoulder while walking his dog - met (2/7/2023 right: 72; left: 75)  Long Term Goal 3: Pt will be independent in ambulation without assistive device for 30 minutes of standing activity in order to improve safety with community ambulation. - met (2/3/2023 )  Long Term Goal 4: Pt will improve lower extremity strength to at least 4+/5 overall bilateral in order to improve stability in standing and ambulation.  - progressing (2/7/2023 4+/5 overall with exception of left hip abduction 4/5)  Long Term Goal 5: Pt will improve tinetti balance and gait assessment from 17 to greater than 22 in order to reduce fall risk - met (2/7/2023 score: 28)    Minutes Tracking:  Time In: 1001  Time Out: 1030  Minutes: 29  Timed Code Treatment Minutes: 29 Minutes    Angelique Xiong     Date: 2/14/2023

## 2023-02-17 ENCOUNTER — APPOINTMENT (OUTPATIENT)
Dept: PHYSICAL THERAPY | Age: 58
End: 2023-02-17
Payer: MEDICARE

## 2023-02-28 PROBLEM — K40.90 RIGHT INGUINAL HERNIA: Status: ACTIVE | Noted: 2023-02-28

## 2023-03-06 ENCOUNTER — OFFICE VISIT (OUTPATIENT)
Dept: GASTROENTEROLOGY | Age: 58
End: 2023-03-06

## 2023-03-06 ENCOUNTER — TELEPHONE (OUTPATIENT)
Dept: GASTROENTEROLOGY | Age: 58
End: 2023-03-06

## 2023-03-06 VITALS
WEIGHT: 186.2 LBS | BODY MASS INDEX: 24.68 KG/M2 | HEART RATE: 82 BPM | RESPIRATION RATE: 18 BRPM | DIASTOLIC BLOOD PRESSURE: 73 MMHG | SYSTOLIC BLOOD PRESSURE: 127 MMHG | HEIGHT: 73 IN | TEMPERATURE: 97.3 F

## 2023-03-06 DIAGNOSIS — Z12.11 COLON CANCER SCREENING: Primary | ICD-10-CM

## 2023-03-06 DIAGNOSIS — Z01.818 PRE-OP TESTING: Primary | ICD-10-CM

## 2023-03-06 DIAGNOSIS — K21.9 GASTROESOPHAGEAL REFLUX DISEASE WITHOUT ESOPHAGITIS: ICD-10-CM

## 2023-03-06 RX ORDER — POLYETHYLENE GLYCOL 3350, SODIUM CHLORIDE, SODIUM BICARBONATE, POTASSIUM CHLORIDE 420; 11.2; 5.72; 1.48 G/4L; G/4L; G/4L; G/4L
4000 POWDER, FOR SOLUTION ORAL ONCE
Qty: 4000 ML | Refills: 0 | Status: SHIPPED | OUTPATIENT
Start: 2023-03-06 | End: 2023-03-06

## 2023-03-06 NOTE — PATIENT INSTRUCTIONS
SURVEY:    You may be receiving a survey from [employer] regarding your visit today. Please complete the survey to enable us to provide the highest quality of care to you and your family. If you cannot score us a very good on any question, please call the office to discuss how we could have made your experience a very good one. Thank you.
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
## 2023-03-08 RX ORDER — TAMSULOSIN HYDROCHLORIDE 0.4 MG/1
CAPSULE ORAL
Qty: 30 CAPSULE | Refills: 1 | Status: SHIPPED | OUTPATIENT
Start: 2023-03-08

## 2023-03-31 RX ORDER — TAMSULOSIN HYDROCHLORIDE 0.4 MG/1
CAPSULE ORAL
Qty: 30 CAPSULE | Refills: 1 | Status: SHIPPED | OUTPATIENT
Start: 2023-03-31

## 2023-04-02 ENCOUNTER — HOSPITAL ENCOUNTER (EMERGENCY)
Age: 58
Discharge: HOME | End: 2023-04-02
Attending: EMERGENCY MEDICINE
Payer: MEDICARE

## 2023-04-02 VITALS
OXYGEN SATURATION: 96 % | DIASTOLIC BLOOD PRESSURE: 76 MMHG | TEMPERATURE: 97 F | RESPIRATION RATE: 18 BRPM | HEART RATE: 86 BPM | SYSTOLIC BLOOD PRESSURE: 130 MMHG

## 2023-04-02 DIAGNOSIS — K52.9 GASTROENTERITIS: Primary | ICD-10-CM

## 2023-04-02 LAB
ABSOLUTE EOS #: <0.03 K/UL (ref 0–0.44)
ABSOLUTE IMMATURE GRANULOCYTE: <0.03 K/UL (ref 0–0.3)
ABSOLUTE LYMPH #: 0.91 K/UL (ref 1.1–3.7)
ABSOLUTE MONO #: 0.86 K/UL (ref 0.1–1.2)
ANION GAP SERPL CALCULATED.3IONS-SCNC: 12 MMOL/L (ref 9–17)
BASOPHILS # BLD: 1 % (ref 0–2)
BASOPHILS ABSOLUTE: 0.03 K/UL (ref 0–0.2)
BUN SERPL-MCNC: 51 MG/DL (ref 6–20)
BUN/CREAT BLD: 44 (ref 9–20)
CALCIUM SERPL-MCNC: 8.3 MG/DL (ref 8.6–10.4)
CHLORIDE SERPL-SCNC: 97 MMOL/L (ref 98–107)
CO2 SERPL-SCNC: 24 MMOL/L (ref 20–31)
CREAT SERPL-MCNC: 1.15 MG/DL (ref 0.7–1.2)
EOSINOPHILS RELATIVE PERCENT: 0 % (ref 1–4)
GFR SERPL CREATININE-BSD FRML MDRD: >60 ML/MIN/1.73M2
GLUCOSE SERPL-MCNC: 237 MG/DL (ref 70–99)
HCT VFR BLD AUTO: 53.9 % (ref 40.7–50.3)
HGB BLD-MCNC: 18.2 G/DL (ref 13–17)
IMMATURE GRANULOCYTES: 0 %
LYMPHOCYTES # BLD: 15 % (ref 24–43)
MCH RBC QN AUTO: 33.3 PG (ref 25.2–33.5)
MCHC RBC AUTO-ENTMCNC: 33.8 G/DL (ref 28.4–34.8)
MCV RBC AUTO: 98.7 FL (ref 82.6–102.9)
MONOCYTES # BLD: 14 % (ref 3–12)
NRBC AUTOMATED: 0 PER 100 WBC
PDW BLD-RTO: 13.8 % (ref 11.8–14.4)
PLATELET # BLD AUTO: 172 K/UL (ref 138–453)
PMV BLD AUTO: 10.4 FL (ref 8.1–13.5)
POTASSIUM SERPL-SCNC: 3.7 MMOL/L (ref 3.7–5.3)
RBC # BLD: 5.46 M/UL (ref 4.21–5.77)
SEG NEUTROPHILS: 70 % (ref 36–65)
SEGMENTED NEUTROPHILS ABSOLUTE COUNT: 4.18 K/UL (ref 1.5–8.1)
SODIUM SERPL-SCNC: 133 MMOL/L (ref 135–144)
WBC # BLD AUTO: 6 K/UL (ref 3.5–11.3)

## 2023-04-02 PROCEDURE — 36415 COLL VENOUS BLD VENIPUNCTURE: CPT

## 2023-04-02 PROCEDURE — 6370000000 HC RX 637 (ALT 250 FOR IP): Performed by: EMERGENCY MEDICINE

## 2023-04-02 PROCEDURE — 6360000002 HC RX W HCPCS: Performed by: EMERGENCY MEDICINE

## 2023-04-02 PROCEDURE — 96361 HYDRATE IV INFUSION ADD-ON: CPT

## 2023-04-02 PROCEDURE — 2580000003 HC RX 258: Performed by: EMERGENCY MEDICINE

## 2023-04-02 PROCEDURE — 85025 COMPLETE CBC W/AUTO DIFF WBC: CPT

## 2023-04-02 PROCEDURE — 99284 EMERGENCY DEPT VISIT MOD MDM: CPT

## 2023-04-02 PROCEDURE — 96374 THER/PROPH/DIAG INJ IV PUSH: CPT

## 2023-04-02 PROCEDURE — 80048 BASIC METABOLIC PNL TOTAL CA: CPT

## 2023-04-02 RX ORDER — ONDANSETRON 4 MG/1
4 TABLET, FILM COATED ORAL EVERY 8 HOURS PRN
Qty: 20 TABLET | Refills: 0 | Status: SHIPPED | OUTPATIENT
Start: 2023-04-02 | End: 2023-04-07

## 2023-04-02 RX ORDER — ONDANSETRON 2 MG/ML
4 INJECTION INTRAMUSCULAR; INTRAVENOUS ONCE
Status: COMPLETED | OUTPATIENT
Start: 2023-04-02 | End: 2023-04-02

## 2023-04-02 RX ORDER — 0.9 % SODIUM CHLORIDE 0.9 %
2000 INTRAVENOUS SOLUTION INTRAVENOUS ONCE
Status: COMPLETED | OUTPATIENT
Start: 2023-04-02 | End: 2023-04-02

## 2023-04-02 RX ORDER — DIPHENOXYLATE HYDROCHLORIDE AND ATROPINE SULFATE 2.5; .025 MG/1; MG/1
1 TABLET ORAL 4 TIMES DAILY PRN
Status: DISCONTINUED | OUTPATIENT
Start: 2023-04-02 | End: 2023-04-02 | Stop reason: HOSPADM

## 2023-04-02 RX ORDER — DIPHENOXYLATE HYDROCHLORIDE AND ATROPINE SULFATE 2.5; .025 MG/1; MG/1
1 TABLET ORAL 3 TIMES DAILY PRN
Qty: 12 TABLET | Refills: 0 | Status: SHIPPED | OUTPATIENT
Start: 2023-04-02 | End: 2023-04-06

## 2023-04-02 RX ADMIN — DIPHENOXYLATE HYDROCHLORIDE AND ATROPINE SULFATE 1 TABLET: 2.5; .025 TABLET ORAL at 16:23

## 2023-04-02 RX ADMIN — ONDANSETRON 4 MG: 2 INJECTION INTRAMUSCULAR; INTRAVENOUS at 16:23

## 2023-04-02 RX ADMIN — SODIUM CHLORIDE 2000 ML: 9 INJECTION, SOLUTION INTRAVENOUS at 16:23

## 2023-04-02 ASSESSMENT — PAIN - FUNCTIONAL ASSESSMENT: PAIN_FUNCTIONAL_ASSESSMENT: NONE - DENIES PAIN

## 2023-04-02 NOTE — ED PROVIDER NOTES
and well perfused  Skin: warm and dry without rash  Neurologic: GCS 15,  Psych: Normal Affect      ------------------------------ ED COURSE/MEDICAL DECISION MAKING----------------------  Medications   diphenoxylate-atropine (LOMOTIL) 2.5-0.025 MG per tablet 1 tablet (1 tablet Oral Given 4/2/23 1623)   0.9 % sodium chloride bolus (2,000 mLs IntraVENous New Bag 4/2/23 1623)   ondansetron (ZOFRAN) injection 4 mg (4 mg IntraVENous Given 4/2/23 1623)         Medical Decision Making: We will rehydrate and give Zofran and Lomotil    Counseling: The emergency provider has spoken with the patient and discussed todays results, in addition to providing specific details for the plan of care and counseling regarding the diagnosis and prognosis. Questions are answered at this time and they are agreeable with the plan.      --------------------------------- IMPRESSION AND DISPOSITION ---------------------------------    IMPRESSION  1.  Gastroenteritis Improving       DISPOSITION  Disposition: Discharge to home  Patient condition is stable                  Doris Perez MD  04/02/23 2351

## 2023-04-07 ENCOUNTER — OFFICE VISIT (OUTPATIENT)
Dept: UROLOGY | Age: 58
End: 2023-04-07

## 2023-04-07 VITALS
WEIGHT: 183 LBS | BODY MASS INDEX: 24.79 KG/M2 | TEMPERATURE: 97.3 F | HEIGHT: 72 IN | DIASTOLIC BLOOD PRESSURE: 71 MMHG | HEART RATE: 72 BPM | SYSTOLIC BLOOD PRESSURE: 118 MMHG

## 2023-04-07 DIAGNOSIS — N32.81 OAB (OVERACTIVE BLADDER): ICD-10-CM

## 2023-04-07 DIAGNOSIS — N40.1 BPH WITH OBSTRUCTION/LOWER URINARY TRACT SYMPTOMS: ICD-10-CM

## 2023-04-07 DIAGNOSIS — R33.9 INCOMPLETE BLADDER EMPTYING: Primary | ICD-10-CM

## 2023-04-07 DIAGNOSIS — N13.8 BPH WITH OBSTRUCTION/LOWER URINARY TRACT SYMPTOMS: ICD-10-CM

## 2023-04-07 ASSESSMENT — ENCOUNTER SYMPTOMS
COUGH: 0
NAUSEA: 0
COLOR CHANGE: 0
VOMITING: 0
SHORTNESS OF BREATH: 0
WHEEZING: 0
EYE REDNESS: 0
ABDOMINAL PAIN: 0
BACK PAIN: 0
CONSTIPATION: 0

## 2023-04-07 NOTE — PATIENT INSTRUCTIONS
SURVEY:    You may be receiving a survey from [employer] regarding your visit today. Please complete the survey to enable us to provide the highest quality of care to you and your family. If you cannot score us a very good on any question, please call the office to discuss how we could have made your experience a very good one. Thank you.

## 2023-04-07 NOTE — PROGRESS NOTES
Random bladderscan performed in office today:  Pt last voided 75 mins ago, scan = 608 mL      Patient then was able to void  250 mLs.
urgency. Musculoskeletal:  Negative for back pain, joint swelling and myalgias. Skin:  Negative for color change, rash and wound. Neurological:  Negative for dizziness, tremors and numbness. Hematological:  Negative for adenopathy. Does not bruise/bleed easily. /71 (Site: Right Upper Arm, Position: Sitting, Cuff Size: Medium Adult)   Pulse 72   Temp 97.3 °F (36.3 °C) (Infrared)   Ht 6' (1.829 m)   Wt 183 lb (83 kg)   BMI 24.82 kg/m²       PHYSICAL EXAM:  Constitutional: Patient in no acute distress; Neuro: alert and oriented to person place and time. Psych: Mood and affect normal.  Skin: Normal  Lungs: Respiratory effort normal  Cardiovascular:  Normal peripheral pulses  Abdomen: Soft, non-tender, non-distended with no CVA, flank pain  Bladder non-tender and not distended. Lymphatics: no palpable lymphadenopathy  Penis normal  Urethral meatus normal  Scrotal exam normal  Testicles normal bilaterally  Epididymis normal bilaterally  No evidence of inguinal hernia        Lab Results   Component Value Date    BUN 51 (H) 04/02/2023     Lab Results   Component Value Date    CREATININE 1.15 04/02/2023     Lab Results   Component Value Date    PSA 1.89 04/11/2022    PSA 1.44 08/21/2019    PSA 1.50 10/08/2018       ASSESSMENT:  This is a 60 y.o. male with the following diagnoses:   Diagnosis Orders   1. Incomplete bladder emptying  NE KIKA POST-VOIDING RESIDUAL URINE&/BLADDER CAP      2. BPH with obstruction/lower urinary tract symptoms  NE KIKA POST-VOIDING RESIDUAL URINE&/BLADDER CAP      3. OAB (overactive bladder)  NE KIKA POST-VOIDING RESIDUAL URINE&/BLADDER CAP           PLAN:  We will maintain Myrbetriq, Flomax, and MiraLAX. He will follow-up with us in 6 months.

## 2023-04-25 RX ORDER — TAMSULOSIN HYDROCHLORIDE 0.4 MG/1
CAPSULE ORAL
Qty: 90 CAPSULE | Refills: 1 | Status: SHIPPED | OUTPATIENT
Start: 2023-04-25

## 2023-05-01 RX ORDER — MIRABEGRON 50 MG/1
TABLET, FILM COATED, EXTENDED RELEASE ORAL
Qty: 90 TABLET | Refills: 1 | Status: SHIPPED | OUTPATIENT
Start: 2023-05-01

## 2023-07-03 ENCOUNTER — HOSPITAL ENCOUNTER (OUTPATIENT)
Dept: MRI IMAGING | Age: 58
Discharge: HOME | End: 2023-07-05
Attending: INTERNAL MEDICINE
Payer: MEDICARE

## 2023-07-03 ENCOUNTER — HOSPITAL ENCOUNTER (OUTPATIENT)
Age: 58
Discharge: HOME | End: 2023-07-03
Attending: INTERNAL MEDICINE
Payer: MEDICARE

## 2023-07-03 ENCOUNTER — HOSPITAL ENCOUNTER (OUTPATIENT)
Dept: GENERAL RADIOLOGY | Age: 58
Discharge: HOME | End: 2023-07-05
Attending: INTERNAL MEDICINE
Payer: MEDICARE

## 2023-07-03 ENCOUNTER — HOSPITAL ENCOUNTER (OUTPATIENT)
Age: 58
Discharge: HOME | End: 2023-07-05
Attending: INTERNAL MEDICINE
Payer: MEDICARE

## 2023-07-03 ENCOUNTER — HOSPITAL ENCOUNTER (OUTPATIENT)
Age: 58
End: 2023-07-03
Attending: INTERNAL MEDICINE
Payer: MEDICARE

## 2023-07-03 DIAGNOSIS — E11.42 TYPE 2 DIABETES MELLITUS WITH DIABETIC POLYNEUROPATHY, WITHOUT LONG-TERM CURRENT USE OF INSULIN (HCC): ICD-10-CM

## 2023-07-03 DIAGNOSIS — M54.41 ACUTE BACK PAIN WITH SCIATICA, RIGHT: ICD-10-CM

## 2023-07-03 DIAGNOSIS — Z01.818 PRE-OP TESTING: ICD-10-CM

## 2023-07-03 DIAGNOSIS — M25.551 RIGHT HIP PAIN: ICD-10-CM

## 2023-07-03 DIAGNOSIS — Z13.220 SCREENING CHOLESTEROL LEVEL: ICD-10-CM

## 2023-07-03 LAB
ANION GAP SERPL CALCULATED.3IONS-SCNC: 7 MMOL/L (ref 9–17)
BUN SERPL-MCNC: 23 MG/DL (ref 6–20)
BUN/CREAT SERPL: 32 (ref 9–20)
CALCIUM SERPL-MCNC: 9 MG/DL (ref 8.6–10.4)
CHLORIDE SERPL-SCNC: 109 MMOL/L (ref 98–107)
CHOLEST SERPL-MCNC: 152 MG/DL
CHOLESTEROL/HDL RATIO: 4.8
CO2 SERPL-SCNC: 23 MMOL/L (ref 20–31)
CREAT SERPL-MCNC: 0.71 MG/DL (ref 0.7–1.2)
CREAT UR-MCNC: 69.1 MG/DL (ref 39–259)
EKG ATRIAL RATE: 76 BPM
EKG P AXIS: 40 DEGREES
EKG P-R INTERVAL: 178 MS
EKG Q-T INTERVAL: 360 MS
EKG QRS DURATION: 88 MS
EKG QTC CALCULATION (BAZETT): 405 MS
EKG R AXIS: 22 DEGREES
EKG T AXIS: 10 DEGREES
EKG VENTRICULAR RATE: 76 BPM
GFR SERPL CREATININE-BSD FRML MDRD: >60 ML/MIN/1.73M2
GLUCOSE SERPL-MCNC: 148 MG/DL (ref 70–99)
HDLC SERPL-MCNC: 32 MG/DL
LDLC SERPL CALC-MCNC: 99 MG/DL (ref 0–130)
MICROALBUMIN UR-MCNC: <12 MG/L
MICROALBUMIN/CREAT UR-RTO: NORMAL MCG/MG CREAT
POTASSIUM SERPL-SCNC: 4.2 MMOL/L (ref 3.7–5.3)
SODIUM SERPL-SCNC: 139 MMOL/L (ref 135–144)
TRIGL SERPL-MCNC: 104 MG/DL

## 2023-07-03 PROCEDURE — 80048 BASIC METABOLIC PNL TOTAL CA: CPT

## 2023-07-03 PROCEDURE — 73502 X-RAY EXAM HIP UNI 2-3 VIEWS: CPT

## 2023-07-03 PROCEDURE — 93010 ELECTROCARDIOGRAM REPORT: CPT | Performed by: INTERNAL MEDICINE

## 2023-07-03 PROCEDURE — 93005 ELECTROCARDIOGRAM TRACING: CPT

## 2023-07-03 PROCEDURE — 80061 LIPID PANEL: CPT

## 2023-07-03 PROCEDURE — 36415 COLL VENOUS BLD VENIPUNCTURE: CPT

## 2023-07-03 PROCEDURE — 83036 HEMOGLOBIN GLYCOSYLATED A1C: CPT

## 2023-07-03 PROCEDURE — 82043 UR ALBUMIN QUANTITATIVE: CPT

## 2023-07-03 PROCEDURE — 72148 MRI LUMBAR SPINE W/O DYE: CPT

## 2023-07-03 PROCEDURE — 82570 ASSAY OF URINE CREATININE: CPT

## 2023-07-04 LAB
EST. AVERAGE GLUCOSE BLD GHB EST-MCNC: 192 MG/DL
HBA1C MFR BLD: 8.3 % (ref 4–6)

## 2023-08-18 ENCOUNTER — TELEPHONE (OUTPATIENT)
Dept: PREADMISSION TESTING | Age: 58
End: 2023-08-18

## 2023-08-18 NOTE — PROGRESS NOTES
Patient's mother Cayla Paris states they received the colon prep instructions and home medications that are to be taken on the day of their procedure with a small sip of water only, from the physician's office.  Instructed Cayla Paris to have the pt take lexapro with a small sip of water prior to arriving to the hospital and to stop taking ozempic as of today until after the procedure per anesthesia recommendation

## 2023-08-18 NOTE — TELEPHONE ENCOUNTER
Patient's mother Monica Cobb states they received the colon prep instructions and home medications that are to be taken on the day of their procedure with a small sip of water only, from the physician's office.  Instructed Monica Cobb to have the pt take lexapro with a small sip of water prior to arriving to the hospital and to stop taking ozempic as of today until after the procedure per anesthesia recommendation

## 2023-08-21 ENCOUNTER — ANESTHESIA EVENT (OUTPATIENT)
Dept: OPERATING ROOM | Age: 58
End: 2023-08-21
Payer: MEDICARE

## 2023-08-21 NOTE — TELEPHONE ENCOUNTER
Chart and EKG reveiwed. EKG 7/3/2023 NSR, Twave inversion inferior leads  PMH: DM with poly neuropathy, smoker, GERD, HLD. /81 at recent PCP visit.   OK to proceed with anesthesia

## 2023-08-30 ENCOUNTER — ANESTHESIA (OUTPATIENT)
Dept: OPERATING ROOM | Age: 58
End: 2023-08-30
Payer: MEDICARE

## 2023-08-30 ENCOUNTER — HOSPITAL ENCOUNTER (OUTPATIENT)
Age: 58
Setting detail: OUTPATIENT SURGERY
Discharge: HOME | End: 2023-08-30
Attending: INTERNAL MEDICINE | Admitting: INTERNAL MEDICINE
Payer: MEDICARE

## 2023-08-30 VITALS
DIASTOLIC BLOOD PRESSURE: 66 MMHG | RESPIRATION RATE: 16 BRPM | SYSTOLIC BLOOD PRESSURE: 110 MMHG | WEIGHT: 189 LBS | TEMPERATURE: 97.1 F | HEART RATE: 65 BPM | BODY MASS INDEX: 25.6 KG/M2 | OXYGEN SATURATION: 96 % | HEIGHT: 72 IN

## 2023-08-30 DIAGNOSIS — Z12.11 SCREENING FOR COLON CANCER: ICD-10-CM

## 2023-08-30 PROCEDURE — 7100000010 HC PHASE II RECOVERY - FIRST 15 MIN: Performed by: INTERNAL MEDICINE

## 2023-08-30 PROCEDURE — A4216 STERILE WATER/SALINE, 10 ML: HCPCS | Performed by: NURSE ANESTHETIST, CERTIFIED REGISTERED

## 2023-08-30 PROCEDURE — 3700000001 HC ADD 15 MINUTES (ANESTHESIA): Performed by: INTERNAL MEDICINE

## 2023-08-30 PROCEDURE — 88305 TISSUE EXAM BY PATHOLOGIST: CPT

## 2023-08-30 PROCEDURE — 2500000003 HC RX 250 WO HCPCS: Performed by: NURSE ANESTHETIST, CERTIFIED REGISTERED

## 2023-08-30 PROCEDURE — 3609027000 HC COLONOSCOPY: Performed by: INTERNAL MEDICINE

## 2023-08-30 PROCEDURE — 3609012400 HC EGD TRANSORAL BIOPSY SINGLE/MULTIPLE: Performed by: INTERNAL MEDICINE

## 2023-08-30 PROCEDURE — 7100000011 HC PHASE II RECOVERY - ADDTL 15 MIN: Performed by: INTERNAL MEDICINE

## 2023-08-30 PROCEDURE — G0121 COLON CA SCRN NOT HI RSK IND: HCPCS | Performed by: INTERNAL MEDICINE

## 2023-08-30 PROCEDURE — 3700000000 HC ANESTHESIA ATTENDED CARE: Performed by: INTERNAL MEDICINE

## 2023-08-30 PROCEDURE — 2580000003 HC RX 258: Performed by: NURSE ANESTHETIST, CERTIFIED REGISTERED

## 2023-08-30 PROCEDURE — 2709999900 HC NON-CHARGEABLE SUPPLY: Performed by: INTERNAL MEDICINE

## 2023-08-30 PROCEDURE — 6360000002 HC RX W HCPCS: Performed by: NURSE ANESTHETIST, CERTIFIED REGISTERED

## 2023-08-30 RX ORDER — SODIUM CHLORIDE, SODIUM LACTATE, POTASSIUM CHLORIDE, CALCIUM CHLORIDE 600; 310; 30; 20 MG/100ML; MG/100ML; MG/100ML; MG/100ML
INJECTION, SOLUTION INTRAVENOUS CONTINUOUS
Status: DISCONTINUED | OUTPATIENT
Start: 2023-08-30 | End: 2023-08-30 | Stop reason: HOSPADM

## 2023-08-30 RX ORDER — PROPOFOL 10 MG/ML
INJECTION, EMULSION INTRAVENOUS PRN
Status: DISCONTINUED | OUTPATIENT
Start: 2023-08-30 | End: 2023-08-30 | Stop reason: SDUPTHER

## 2023-08-30 RX ORDER — PHENYLEPHRINE HYDROCHLORIDE 10 MG/ML
INJECTION INTRAVENOUS PRN
Status: DISCONTINUED | OUTPATIENT
Start: 2023-08-30 | End: 2023-08-30 | Stop reason: SDUPTHER

## 2023-08-30 RX ORDER — LIDOCAINE HYDROCHLORIDE 20 MG/ML
INJECTION, SOLUTION EPIDURAL; INFILTRATION; INTRACAUDAL; PERINEURAL PRN
Status: DISCONTINUED | OUTPATIENT
Start: 2023-08-30 | End: 2023-08-30 | Stop reason: SDUPTHER

## 2023-08-30 RX ORDER — SODIUM CHLORIDE 9 MG/ML
INJECTION INTRAVENOUS PRN
Status: DISCONTINUED | OUTPATIENT
Start: 2023-08-30 | End: 2023-08-30 | Stop reason: SDUPTHER

## 2023-08-30 RX ADMIN — SODIUM CHLORIDE 20 ML: 9 INJECTION INTRAMUSCULAR; INTRAVENOUS; SUBCUTANEOUS at 10:07

## 2023-08-30 RX ADMIN — PROPOFOL 50 MG: 10 INJECTION, EMULSION INTRAVENOUS at 09:49

## 2023-08-30 RX ADMIN — PROPOFOL 140 MCG/KG/MIN: 10 INJECTION, EMULSION INTRAVENOUS at 09:51

## 2023-08-30 RX ADMIN — LIDOCAINE HYDROCHLORIDE 140 MG: 20 INJECTION, SOLUTION EPIDURAL; INFILTRATION; INTRACAUDAL; PERINEURAL at 09:49

## 2023-08-30 RX ADMIN — PHENYLEPHRINE HYDROCHLORIDE 200 MCG: 10 INJECTION INTRAVENOUS at 10:08

## 2023-08-30 RX ADMIN — PROPOFOL 20 MG: 10 INJECTION, EMULSION INTRAVENOUS at 09:50

## 2023-08-30 RX ADMIN — SODIUM CHLORIDE, POTASSIUM CHLORIDE, SODIUM LACTATE AND CALCIUM CHLORIDE: 600; 310; 30; 20 INJECTION, SOLUTION INTRAVENOUS at 08:24

## 2023-08-30 RX ADMIN — PHENYLEPHRINE HYDROCHLORIDE 150 MCG: 10 INJECTION INTRAVENOUS at 10:07

## 2023-08-30 ASSESSMENT — LIFESTYLE VARIABLES: SMOKING_STATUS: 1

## 2023-08-30 NOTE — PROGRESS NOTES
Pt verbalized readiness to go home. Discharge instructions given to pt, mother, and girlfriend. Verbalized understanding and all questions answered at this time. Discharge Criteria    Inpatients must meet Criteria 1 through 7. All other patients are either YES or N/A. If a NO is chosen then Anesthesia or Surgeon must be notified. 1.  Minimum 30 minutes after last dose of sedative medication. Yes      2. Systolic BP between 90 - 055. Diastolic BP between 60 - 90. Yes      3. Pulse between 60 - 120    Yes      4. Respirations between 8 - 25. Yes      5. SpO2 92% - 100%. Yes      6. Able to cough and swallow or return to baseline function. Yes      7. Alert and oriented or return to baseline mental status. Yes      8. Demonstrates controlled, coordinated movements, ambulates with steady gait, or return to baseline activity function. Yes      9. Minimal or no pain or nausea, or at a level tolerable and acceptable to patient. Yes      10. Takes and retains oral fluids as allowed. Yes      11. Procedural / perioperative site stable. Minimal or no bleeding. Yes          12. If GI endoscopy procedure, minimal or no abdominal distention or passing flatus. Yes      13. Written discharge instructions and emergency telephone number provided. Yes      14. Accompanied by a responsible adult.     Yes

## 2023-08-30 NOTE — ANESTHESIA PRE PROCEDURE
Department of Anesthesiology  Preprocedure Note       Name:  Sara Gardner   Age:  60 y.o.  :  1965                                          MRN:  [id number]         Date:  2023      Surgeon: Will Jones):  Luiza Connors MD    Procedure: Procedure(s):  COLORECTAL CANCER SCREENING, NOT HIGH RISK  EGD ESOPHAGOGASTRODUODENOSCOPY    Medications prior to admission:   Prior to Admission medications    Medication Sig Start Date End Date Taking?  Authorizing Provider   aspirin (ASPIRIN LOW DOSE) 81 MG EC tablet Take 1 tablet by mouth daily 23   Xi Downs MD   MYRBETRIQ 50 MG TB24 TAKE 1 TABLET (50 MG) BY MOUTH DAILY 23   AMERICA Alexandre CNP   tamsulosin (FLOMAX) 0.4 MG capsule TAKE 1 CAPSULE BY MOUTH EVERY DAY 23   AMERICA Alexandre CNP   blood glucose test strips (ACCU-CHEK SMARTVIEW) strip 1 each by Does not apply route daily 23   Xi Downs MD   Insulin Pen Needle (BD PEN NEEDLE ERVIN 2ND GEN) 32G X 4 MM MISC 1 each by Does not apply route daily 3/27/23   Xi Downs MD   ondansetron [facility]) 4 MG tablet Take 1 tablet by mouth 3 times daily as needed for Nausea or Vomiting 22   AMERICA Perkins CNP   omeprazole (PRILOSEC) 20 MG delayed release capsule TAKE 1 CAPSULE BY MOUTH EVERY DAY 22   Xi Downs MD   atorvastatin (LIPITOR) 80 MG tablet Take 1 tablet by mouth three times a week 22   Xi Downs MD   FLUoxetine (PROZAC) 10 MG capsule Take 1 capsule by mouth daily 10/21/22   Xi Downs MD   sildenafil (REVATIO) 20 MG tablet Take 3 tablets by mouth as needed (erectile dysfunction) 22   Simba Alejandra MD   Continuous Blood Gluc Sensor (FREESTYLE HELEN 2 SENSOR) MISC USE WITH FREESTYLE HELEN 2 READER TO CHECK BLOOD SUGARS 6-8 TIMES A DAY 22   Historical Provider, MD   JARDIANCE 10 MG tablet TAKE 1 TABLET BY MOUTH EVERY MORNING 3/14/22   Historical Provider, MD   insulin glargine (LANTUS) 100

## 2023-08-30 NOTE — ANESTHESIA POSTPROCEDURE EVALUATION
Department of Anesthesiology  Postprocedure Note    Patient: Chinyere Ochoa  MRN: [id number]  YOB: 1965  Date of evaluation: 8/30/2023      Procedure Summary     Date: 08/30/23 Room / Location: [address] [place] [address]    Anesthesia Start: 3367 Anesthesia Stop: 1020    Procedures:       COLORECTAL CANCER Screening, poor prep, not completed      EGD BIOPSY Diagnosis:       Screening for colon cancer      (Screening for colon cancer [Z12.11])    Surgeons: Emelia Irizarry MD Responsible Provider: AMERICA Armstrong CRNA    Anesthesia Type: MAC ASA Status: 3          Anesthesia Type: No value filed.     Analia Phase I: Analia Score: 10    Analia Phase II: Analia Score: 10      Anesthesia Post Evaluation    Patient location during evaluation: PACU  Patient participation: complete - patient participated  Level of consciousness: awake and alert  Pain score: 0  Airway patency: patent  Nausea & Vomiting: no nausea and no vomiting  Complications: no  Cardiovascular status: blood pressure returned to baseline  Respiratory status: acceptable and room air  Hydration status: stable  Pain management: adequate and satisfactory to patient

## 2023-08-30 NOTE — H&P
History and Physical    Patient's Name/Date of Birth: Kathy Grossman / 1965 (89 y.o.)    MRN: [id number]     Date: August 30, 2023       CHIEF COMPLAINT:  screening for colon cancer      Andry Boston is a 60year old man with a past medical history of diabetes mellitus II, hyperlipidemia who presents for colon cancer screening. His mother was present for the visit and states that he had a prior colonoscopy scheduled in 2019, but it was cancelled due to insurance coverage and he had COVID 19 at the time. He states that he takes omeprazole 20 mg daily and has never had an EGD.       Family history of colon cancer: No  Blood in stool: No  Unintentional weight loss: No  Abdominal pain: No  Prior colonoscopy: No  Constipation history: No  Number of bowel movements a day: 1  Change in stool caliber: No    Past Medical History:   Diagnosis Date    Hyperlipidemia     Learning disability     Nephrolithiasis 01/01/2007    Tobacco abuse     Type 2 diabetes mellitus [facility])      Past Surgical History:   Procedure Laterality Date    APPENDECTOMY  1978    COLONOSCOPY  3/3/15    -polyps    TONSILLECTOMY  1974    TURP  05/10/2022    Dr Rey Gonzalez- [facility]    TURP N/A 5/10/2022    CYSTOSCOPY TRANSURETHRAL RESECTION PROSTATE LASER-PVP GREENLIGHT performed by Kobe Kearney MD at [address] [place] [address]     Current Facility-Administered Medications   Medication Dose Route Frequency Provider Last Rate Last Admin    lactated ringers IV soln infusion   IntraVENous Continuous AMERICA Hallman - CRNA 100 mL/hr at 08/30/23 0824 New Bag at 08/30/23 0824     No Known Allergies  Family History   Problem Relation Age of Onset    High Blood Pressure Father      Social History     Socioeconomic History    Marital status: Single     Spouse name: Not on file    Number of children: Not on file    Years of education: Not on file    Highest education level: Not on file   Occupational History    Not on file   Tobacco Use    Smoking status: Every

## 2023-08-30 NOTE — DISCHARGE INSTRUCTIONS
SAME DAY SURGERY DISCHARGE INSTRUCTIONS    1. Do not drive or operate hazardous machinery for 24 hours. 2.  Do not make important personal or business decisions for 24 hours. 3.  Do not drink alcoholic beverages for 24 hours. 4.  Do not smoke tobacco products for 24 hours. 5.  Eat light foods (Jell-O, soups, etc....) and drink plenty of fluids (water, Sprite, etc...) up to 8 glasses per day, as you can tolerate. 6.  Limit your activities for 24 hours. Do not engage in heavy work until your surgeon gives you permission. 7.  Call your surgeon for any questions regarding your surgery. 8.  Patient should not be left alone for 12-24 hours following surgical procedure. COLONOSCOPY DISCHARGE INSTRUCTIONS:    It's normal to have a feeling of fullness or mild cramping in your abdomen afterwards due to air that is put into your bowel during the procedure. Mild activities such as walking will help you pass the air. You may resume your regular diet. ENDOSCOPY DISCHARGE INSTRUCTIONS:    You may have a mild sore throat; this should get better over the next day or two. Sipping warm liquids, a salt-water gargle or throat lozenges may be used. You may have some belching or a feeling of fullness in your abdomen. This is from air that was put into your stomach during the procedure. This should pass in a few hours. May resume your regular diet. You will receive a phone call with your test results in 2 weeks. CALL THE DOCTOR IF YOU HAVE:    Chest pain or trouble breathing. A hoarse voice or trouble swallowing    Bleeding, vomiting or spitting up of blood that is more than a few streaks or red or black stools    A fever above 101F or if you have chills    Pain that is worse or different than any pain you had before the procedure    Nausea or vomiting that lasts for more than 2 hours. If symptoms are to severe call 911 or go to the nearest Emergency Room.

## 2023-08-31 LAB — SURGICAL PATHOLOGY REPORT: NORMAL

## 2023-09-01 NOTE — OP NOTE
[facility]    COLONOSCOPY    PROCEDURE DATE: 08/30/2023    REFERRING PHYSICIAN: No ref. provider found     PRIMARY CARE PROVIDER: Ann Ward MD    ATTENDING PHYSICIAN: Carmelita Perera MD     HISTORY: Mr. Jaylan Salas is a 60 y.o. male who presents to the  endoscopy unit for colonoscopy. The patient's clinical history is remarkable for DM II. He is currently medically stable and appropriate for the planned procedure. PREOPERATIVE DIAGNOSIS: screening for colon cancer. PROCEDURES:   Transanal Colonoscopy --screening. POSTPROCEDURE DIAGNOSIS:  Poor prep    MEDICATIONS: MAC per anesthesia     EBL None       INSTRUMENT: Olympus CF-H190 AL Pediatric flexible Colonoscope. PREPARATION: The nature and character of the procedure as well as risks, benefits, and alternatives were discussed with the patient and informed consent was obtained. Complications were said to include, but were not limited to: medication allergy, medication reaction, cardiovascular and respiratory problems, bleeding, perforation, infection, and/or missed diagnosis. Following arrival in the endoscopy room, the patient was placed in the left lateral decubitus position and final time-out accomplished in the presence of the nursing staff. Baseline vital signs were obtained and reviewed, and IV sedation was subsequently initiated. FINDINGS: Rectal examination demonstrated no significant visible external abnormality and digital palpation was unremarkable. Following adequate conscious sedation the colonoscope was introduced and advanced under poor visualization to the transverse colon due to thick copious brown stool in colon - overall a poor prep. IMPRESSION:   Poor prep     RECOMMENDATIONS:   1) Follow up with referring provider, as previously scheduled.    2) Repeat Colonoscopy in 1-3 months       Electronically signed by Carmelita Perera MD on 8/31/2023 at 9:27 PM

## 2023-09-05 PROBLEM — K29.70 GASTRITIS WITHOUT BLEEDING: Status: ACTIVE | Noted: 2023-09-05

## 2023-09-06 ENCOUNTER — TELEPHONE (OUTPATIENT)
Dept: GASTROENTEROLOGY | Age: 58
End: 2023-09-06

## 2023-09-28 NOTE — CARE COORDINATION
Attempted to contact Dmitri Farris today. Left voice message requesting patient call this CC Nurse at [phone]. Topical Ketoconazole Pregnancy And Lactation Text: This medication is Pregnancy Category B and is considered safe during pregnancy. It is unknown if it is excreted in breast milk.

## 2023-11-06 RX ORDER — TAMSULOSIN HYDROCHLORIDE 0.4 MG/1
CAPSULE ORAL
Qty: 90 CAPSULE | Refills: 1 | OUTPATIENT
Start: 2023-11-06

## 2024-02-28 NOTE — PATIENT INSTRUCTIONS
SURVEY:    You may be receiving a survey from [facility] regarding your visit today. Please complete the survey to enable us to provide the highest quality of care to you and your family. If you cannot score us a very good on any question, please call the office to discuss how we could of made your experience a very good one. Thank you. [facility]  Progress Note    Alejandro Hanna is a 69 year old male.   Chief Complaint   Patient presents with    Toenail Care     3 mo f/u - has pain in the R Achilles for the past week - he exercised and he felt pain all of the sudden - rates pain as 6/10 with weight bearing          HPI:     This is a pleasant male with COPD, alcohol abuse, PMH of bladder cancer, in remission, and tobacco use disorder.       He presents to clinic today due to long thick painful toenails and painful right foot callus.        Allergies: Patient has no known allergies.   Current Outpatient Medications   Medication Sig Dispense Refill    levothyroxine 88 MCG Oral Tab Take 1 tablet (88 mcg total) by mouth before breakfast. 90 tablet 1    atorvastatin 10 MG Oral Tab Take 1 tablet (10 mg total) by mouth daily. 90 tablet 3    Omeprazole 40 MG Oral Capsule Delayed Release Take 1 capsule (40 mg total) by mouth daily. 90 capsule 3    Fenofibrate 160 MG Oral Tab Take 1 tablet (160 mg total) by mouth daily. 90 tablet 1    fluticasone-salmeterol 250-50 MCG/ACT Inhalation Aerosol Powder, Breath Activated Inhale 1 puff into the lungs Q12H. 3 each 3    Olmesartan Medoxomil 40 MG Oral Tab TAKE 1 TABLET (40 MG TOTAL) BY MOUTH DAILY FOR BLOOD PRESSURE 90 tablet 3    phenazopyridine HCl (AZO TABS) 95 MG Oral Tab Take 1 tablet (95 mg total) by mouth 3 (three) times daily as needed for Pain. 21 tablet 6    fluticasone propionate 50 MCG/ACT Nasal Suspension 2 sprays by Nasal route daily. 48 mL 2    albuterol (VENTOLIN HFA) 108 (90 Base) MCG/ACT Inhalation Aero Soln Inhale 2 puffs into the lungs every 6 (six) hours as needed for Wheezing. 54 each 5    Multiple Vitamins-Minerals (CENTRUM SILVER 50+MEN OR) Take by mouth.      Dutasteride 0.5 MG Oral Cap         Past Medical History:   Diagnosis Date    Allergic rhinitis     Flonase    Bladder cancer (HCC) 11/2019    f/u [facility]    BPH (benign prostatic hyperplasia)     COPD (HCC)      moderate-severe     Disorder of thyroid     ED (erectile dysfunction)     Esophageal reflux     Essential hypertension     Hiatal hernia     High blood pressure     High cholesterol     Hypothyroidism     Injury 01/29/2019    Left knee injury, from fall    Osteoarthritis     in knee's, hip's    Pulmonary nodules     first noted 2016on LD screen CT    Tobacco abuse       Past Surgical History:   Procedure Laterality Date    COLONOSCOPY      02-14-17    COLONOSCOPY N/A 12/28/2020    Procedure: COLONOSCOPY;  Surgeon: Real Abreu MD;  Location: [facility] ENDOSCOPY    ELECTROCARDIOGRAM, COMPLETE  12/15/2013    scanned to media tab    ORAL SURGERY      OTHER SURGICAL HISTORY  2009    cyst removal, side of head      Family History   Problem Relation Age of Onset    Dementia Mother     Diabetes Paternal Grandmother     Cancer Sister         lung cancer      Social History     Socioeconomic History    Marital status:    Tobacco Use    Smoking status: Every Day     Packs/day: 1.00     Years: 40.00     Additional pack years: 0.00     Total pack years: 40.00     Types: Cigarettes    Smokeless tobacco: Never    Tobacco comments:     2/2022: less than 1 pack per day   Vaping Use    Vaping Use: Never used   Substance and Sexual Activity    Alcohol use: Yes     Alcohol/week: 2.0 standard drinks of alcohol     Types: 2 Cans of beer per week     Comment: 2 beer per day    Drug use: No   Other Topics Concern    Caffeine Concern Yes     Comment: coffee-1 cup/day           REVIEW OF SYSTEMS:   Denies nausea, fever, chills  No calf pain  No other muscle or joint aches  Denies chest pain or shortness of breath.      EXAM:   There were no vitals taken for this visit.    Constitutional:   Patient in no apparent distress. Well kept. Of normal body habitus. Alert and oriented to person, place, and time.  Vascular Examination:  DP pulse is NP  PT pulse is palpable  Capillary refill is adequate  Integumentary Examination:   The  patient's nails appear incurvated, thickened, elongated, dystrophic, discolored with subungual debris 1-5 right, 1-5  left nails.  Callus right sub 5th met head  Skin is of diminished texture and decreased turgor.  Neurological Examination:  Sharp/dull is present to right and is present to left.  Parasthesias absent.  Musculoskeletal Examination:  Muscle Strength is 5/5.  B/L tailor bunion  Pain on palpation to nails and right foot callus      LABS & IMAGING:     Lab Results   Component Value Date    GLU 93 02/15/2024    BUN 16 02/15/2024    CREATSERUM 0.96 02/15/2024    BUNCREA 16.7 02/15/2024    ANIONGAP 7 02/15/2024    GFRAA 111 10/12/2021    GFRNAA 96 10/12/2021    CA 9.9 02/15/2024     (L) 02/15/2024    K 4.1 02/15/2024    CL 99 02/15/2024    CO2 28.0 02/15/2024    OSMOCALC 279 02/15/2024        No results found for: \"EAG\", \"A1C\"     No results found.     ASSESSMENT AND PLAN:   Diagnoses and all orders for this visit:    Bilateral foot pain    Onychomycosis    Foot callus    Tailor's bunion of both feet            Plan:     Evaluated patient. Discussed treatment options with patient.  Discussed proper hygiene and care for feet as well as use of emollient creams (ie Urea based creams)  Answered all patient questions. Discussed offloading hyperkeratotic lesions with proper shoe gear, offloading pads, and insoles.    Procedures: (CPT 61988 Paring or cutting of benign hyperkeratotic lesion)  One lesion pared utilizing #15 blade right foot without incident.    Evaluated the patient. Discussed treatment options with the patient.  Discussed with patient proper care and hygiene for their feet.  Patient tolerated procedures well, without incident.    Instrumentation used includes nail nippers and electric  where appropriate.  Procedure: (53878 Debridement of toenails 6-10) Surgically debrided and mechanically reduced 6 or more toenails.Hemmorhage occurred none.Nails that were debrided appeared dystrophic  and caused the patient pain in shoe gear.Nails 5 Left & 5 Right.       RTC 3 months for nail and callus care    No follow-ups on file.    Kian Wolfe DPM  2/28/24

## 2024-10-25 LAB
ESTIMATED AVERAGE GLUCOSE: NORMAL
HBA1C MFR BLD: 7.3 %

## 2025-02-17 SDOH — HEALTH STABILITY: PHYSICAL HEALTH: ON AVERAGE, HOW MANY MINUTES DO YOU ENGAGE IN EXERCISE AT THIS LEVEL?: 20 MIN

## 2025-02-17 SDOH — HEALTH STABILITY: PHYSICAL HEALTH: ON AVERAGE, HOW MANY DAYS PER WEEK DO YOU ENGAGE IN MODERATE TO STRENUOUS EXERCISE (LIKE A BRISK WALK)?: 7 DAYS

## 2025-02-18 ENCOUNTER — HOSPITAL ENCOUNTER (OUTPATIENT)
Age: 60
Discharge: HOME | End: 2025-02-18
Payer: MEDICARE

## 2025-02-18 ENCOUNTER — OFFICE VISIT (OUTPATIENT)
Dept: PRIMARY CARE CLINIC | Age: 60
End: 2025-02-18

## 2025-02-18 VITALS
DIASTOLIC BLOOD PRESSURE: 68 MMHG | HEIGHT: 71 IN | HEART RATE: 88 BPM | OXYGEN SATURATION: 94 % | WEIGHT: 197 LBS | BODY MASS INDEX: 27.58 KG/M2 | SYSTOLIC BLOOD PRESSURE: 108 MMHG

## 2025-02-18 DIAGNOSIS — N40.1 BPH WITH OBSTRUCTION/LOWER URINARY TRACT SYMPTOMS: ICD-10-CM

## 2025-02-18 DIAGNOSIS — M54.12 LEFT CERVICAL RADICULOPATHY: ICD-10-CM

## 2025-02-18 DIAGNOSIS — R45.86 MOOD CHANGES: ICD-10-CM

## 2025-02-18 DIAGNOSIS — N13.8 BPH WITH OBSTRUCTION/LOWER URINARY TRACT SYMPTOMS: Primary | ICD-10-CM

## 2025-02-18 DIAGNOSIS — N13.8 BPH WITH OBSTRUCTION/LOWER URINARY TRACT SYMPTOMS: ICD-10-CM

## 2025-02-18 DIAGNOSIS — E11.42 TYPE 2 DIABETES MELLITUS WITH DIABETIC POLYNEUROPATHY, WITHOUT LONG-TERM CURRENT USE OF INSULIN (HCC): ICD-10-CM

## 2025-02-18 DIAGNOSIS — S19.9XXD INJURY OF NECK, SUBSEQUENT ENCOUNTER: ICD-10-CM

## 2025-02-18 DIAGNOSIS — N40.1 BPH WITH OBSTRUCTION/LOWER URINARY TRACT SYMPTOMS: Primary | ICD-10-CM

## 2025-02-18 LAB
ANION GAP SERPL CALCULATED.3IONS-SCNC: 11 MMOL/L (ref 9–16)
BASOPHILS # BLD: 0.08 K/UL (ref 0–0.2)
BASOPHILS NFR BLD: 1 % (ref 0–2)
BUN SERPL-MCNC: 18 MG/DL (ref 8–23)
BUN/CREAT SERPL: 20 (ref 9–20)
CALCIUM SERPL-MCNC: 9.1 MG/DL (ref 8.6–10.4)
CHLORIDE SERPL-SCNC: 101 MMOL/L (ref 98–107)
CO2 SERPL-SCNC: 24 MMOL/L (ref 20–31)
CREAT SERPL-MCNC: 0.9 MG/DL (ref 0.7–1.2)
EOSINOPHIL # BLD: 0.23 K/UL (ref 0–0.44)
EOSINOPHILS RELATIVE PERCENT: 3 % (ref 1–4)
ERYTHROCYTE [DISTWIDTH] IN BLOOD BY AUTOMATED COUNT: 13.3 % (ref 11.8–14.4)
GFR, ESTIMATED: >90 ML/MIN/1.73M2
GLUCOSE SERPL-MCNC: 189 MG/DL (ref 74–99)
HCT VFR BLD AUTO: 50.3 % (ref 40.7–50.3)
HGB BLD-MCNC: 17 G/DL (ref 13–17)
IMM GRANULOCYTES # BLD AUTO: <0.03 K/UL (ref 0–0.3)
IMM GRANULOCYTES NFR BLD: 0 %
LYMPHOCYTES NFR BLD: 1.82 K/UL (ref 1.1–3.7)
LYMPHOCYTES RELATIVE PERCENT: 23 % (ref 24–43)
MCH RBC QN AUTO: 33.6 PG (ref 25.2–33.5)
MCHC RBC AUTO-ENTMCNC: 33.8 G/DL (ref 28.4–34.8)
MCV RBC AUTO: 99.4 FL (ref 82.6–102.9)
MONOCYTES NFR BLD: 0.53 K/UL (ref 0.1–1.2)
MONOCYTES NFR BLD: 7 % (ref 3–12)
NEUTROPHILS NFR BLD: 66 % (ref 36–65)
NEUTS SEG NFR BLD: 5.33 K/UL (ref 1.5–8.1)
NRBC BLD-RTO: 0 PER 100 WBC
PLATELET # BLD AUTO: 168 K/UL (ref 138–453)
PMV BLD AUTO: 10.2 FL (ref 8.1–13.5)
POTASSIUM SERPL-SCNC: 4.4 MMOL/L (ref 3.7–5.3)
RBC # BLD AUTO: 5.06 M/UL (ref 4.21–5.77)
SODIUM SERPL-SCNC: 136 MMOL/L (ref 136–145)
WBC OTHER # BLD: 8 K/UL (ref 3.5–11.3)

## 2025-02-18 PROCEDURE — 85025 COMPLETE CBC W/AUTO DIFF WBC: CPT

## 2025-02-18 PROCEDURE — 36415 COLL VENOUS BLD VENIPUNCTURE: CPT

## 2025-02-18 PROCEDURE — 80048 BASIC METABOLIC PNL TOTAL CA: CPT

## 2025-02-18 RX ORDER — ARIPIPRAZOLE 5 MG/1
5 TABLET ORAL DAILY
COMMUNITY

## 2025-02-18 RX ORDER — PREGABALIN 50 MG/1
50 CAPSULE ORAL 2 TIMES DAILY
Qty: 120 CAPSULE | Refills: 0 | Status: SHIPPED | OUTPATIENT
Start: 2025-02-18 | End: 2025-04-19

## 2025-02-18 RX ORDER — GABAPENTIN 100 MG/1
CAPSULE ORAL
Qty: 40 CAPSULE | Refills: 0 | Status: SHIPPED | OUTPATIENT
Start: 2025-02-18 | End: 2025-03-04

## 2025-02-18 RX ORDER — PREGABALIN 50 MG/1
50 CAPSULE ORAL 2 TIMES DAILY
Qty: 120 CAPSULE | Refills: 0 | Status: SHIPPED | OUTPATIENT
Start: 2025-02-18 | End: 2025-02-18

## 2025-02-18 SDOH — ECONOMIC STABILITY: FOOD INSECURITY: WITHIN THE PAST 12 MONTHS, THE FOOD YOU BOUGHT JUST DIDN'T LAST AND YOU DIDN'T HAVE MONEY TO GET MORE.: NEVER TRUE

## 2025-02-18 SDOH — ECONOMIC STABILITY: FOOD INSECURITY: WITHIN THE PAST 12 MONTHS, YOU WORRIED THAT YOUR FOOD WOULD RUN OUT BEFORE YOU GOT MONEY TO BUY MORE.: NEVER TRUE

## 2025-02-18 ASSESSMENT — PATIENT HEALTH QUESTIONNAIRE - PHQ9
1. LITTLE INTEREST OR PLEASURE IN DOING THINGS: NOT AT ALL
SUM OF ALL RESPONSES TO PHQ QUESTIONS 1-9: 0
SUM OF ALL RESPONSES TO PHQ QUESTIONS 1-9: 0
SUM OF ALL RESPONSES TO PHQ9 QUESTIONS 1 & 2: 0
SUM OF ALL RESPONSES TO PHQ QUESTIONS 1-9: 0
2. FEELING DOWN, DEPRESSED OR HOPELESS: NOT AT ALL
SUM OF ALL RESPONSES TO PHQ QUESTIONS 1-9: 0

## 2025-02-18 NOTE — PROGRESS NOTES
[facility]      Patient:  Jamshid Boston 60 y.o. male     Date of Service: 02/18/25        Chief Complaint:   Chief Complaint   Patient presents with    New Patient     Here to establish care.    Neck Pain     Increased pain and gabapentin isn't working as well.    Medication Check     Discuss medications         History of Present Illness     History of Present Illness  Fu chronic conditions    He reports experiencing intermittent numbness in his left shoulder and right leg, which he attributes to a fall in March 2023. He was prescribed gabapentin twice daily following a fall and subsequent concussion two years ago, but it appears to be ineffective. He also mentions a severe neck injury sustained on 12/22/2022, which has resulted in significant lifestyle changes, including an inability to perform physical tasks such as climbing ladders or cutting firewood. 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  He received several injections from pain mgt at [facility] in [place], but these did not provide relief. He has also undergone physical therapy. His mother notes a change in his demeanor and communication skills since the accident.    He has a history of diabetes and is currently on a regimen of 30 units of insulin nightly, metformin, Jardiance, and Ozempic 1 mg weekly. His diabetes is managed by Dr. Ramos, endocrinology in [place]    He is also under the care of a urology, Dr. Santos from [facility], who has prescribed tamsulosin. He is scheduled for a follow-up visit next week. He has a history of prostate issues and underwent a procedure previously.    He is currently on Prozac and Abilify as needed. He was diagnosed with bipolar disorder by [facility] and was prescribed Abilify. He reports no current feelings of depression.    He has not had his cholesterol levels checked recently and is uncertain about the necessity of his medication.

## 2025-04-08 ENCOUNTER — HOSPITAL ENCOUNTER (OUTPATIENT)
Age: 60
Discharge: HOME | End: 2025-04-10
Payer: MEDICARE

## 2025-04-08 ENCOUNTER — HOSPITAL ENCOUNTER (OUTPATIENT)
Dept: GENERAL RADIOLOGY | Age: 60
Discharge: HOME | End: 2025-04-10
Payer: MEDICARE

## 2025-04-08 ENCOUNTER — OFFICE VISIT (OUTPATIENT)
Dept: PRIMARY CARE CLINIC | Age: 60
End: 2025-04-08
Payer: MEDICARE

## 2025-04-08 VITALS
WEIGHT: 196.4 LBS | DIASTOLIC BLOOD PRESSURE: 80 MMHG | SYSTOLIC BLOOD PRESSURE: 128 MMHG | HEART RATE: 85 BPM | OXYGEN SATURATION: 95 % | BODY MASS INDEX: 27.19 KG/M2

## 2025-04-08 DIAGNOSIS — M25.512 ACUTE PAIN OF LEFT SHOULDER: ICD-10-CM

## 2025-04-08 DIAGNOSIS — M54.12 LEFT CERVICAL RADICULOPATHY: ICD-10-CM

## 2025-04-08 DIAGNOSIS — M25.512 ACUTE PAIN OF LEFT SHOULDER: Primary | ICD-10-CM

## 2025-04-08 DIAGNOSIS — S19.9XXD INJURY OF NECK, SUBSEQUENT ENCOUNTER: ICD-10-CM

## 2025-04-08 DIAGNOSIS — E11.42 TYPE 2 DIABETES MELLITUS WITH DIABETIC POLYNEUROPATHY, WITHOUT LONG-TERM CURRENT USE OF INSULIN (HCC): ICD-10-CM

## 2025-04-08 PROCEDURE — G2211 COMPLEX E/M VISIT ADD ON: HCPCS | Performed by: FAMILY MEDICINE

## 2025-04-08 PROCEDURE — 73030 X-RAY EXAM OF SHOULDER: CPT

## 2025-04-08 PROCEDURE — 99214 OFFICE O/P EST MOD 30 MIN: CPT | Performed by: FAMILY MEDICINE

## 2025-04-08 RX ORDER — PREGABALIN 50 MG/1
50 CAPSULE ORAL 2 TIMES DAILY
Qty: 180 CAPSULE | Refills: 0 | Status: SHIPPED | OUTPATIENT
Start: 2025-04-08 | End: 2025-07-07

## 2025-04-08 NOTE — PROGRESS NOTES
[facility]      Patient:  Jamshid Boston 60 y.o. male     Date of Service: 04/08/25        Chief Complaint:   Chief Complaint   Patient presents with    Follow-up     Lyrica-50mg BID         History of Present Illness     History of Present Illness  Fu chronic conditions    History of cervical radiculopathy, previously started on Lyrica 50 mg twice daily.  Notes strong symptomatic improvement with the medication.  No side effects reported.    He has a history of diabetes and is currently on a regimen of 30 units of insulin nightly, metformin, Jardiance, and Ozempic 1 mg weekly. His diabetes is managed by Dr. Ramos, endocrinology in [place]    He is currently on Prozac and Abilify as needed. He was diagnosed with bipolar disorder by [facility] and was prescribed Abilify. He reports no current feelings of depression.    Left shoulder pain ongoing 1 week, was lifting trees and heavy items and felt a strain in his left shoulder        Allergies:    Patient has no known allergies.    Medication List:    Current Outpatient Medications   Medication Sig Dispense Refill    ARIPiprazole (ABILIFY) 5 MG tablet Take 1 tablet by mouth daily      pregabalin (LYRICA) 50 MG capsule Take 1 capsule by mouth 2 times daily for 60 days. Max Daily Amount: 100 mg 120 capsule 0    omeprazole (PRILOSEC) 20 MG delayed release capsule Take 1 capsule by mouth daily 90 capsule 3    aspirin (ASPIRIN LOW DOSE) 81 MG EC tablet Take 1 tablet by mouth daily 90 tablet 3    MYRBETRIQ 50 MG TB24 TAKE 1 TABLET (50 MG) BY MOUTH DAILY 90 tablet 1    tamsulosin (FLOMAX) 0.4 MG capsule TAKE 1 CAPSULE BY MOUTH EVERY DAY 90 capsule 1    atorvastatin (LIPITOR) 80 MG tablet Take 1 tablet by mouth three times a week 38 tablet 3    FLUoxetine (PROZAC) 10 MG capsule Take 1 capsule by mouth daily 90 capsule 3    sildenafil (REVATIO) 20 MG tablet Take 3 tablets by mouth as needed (erectile dysfunction) 30 tablet 3    Continuous Blood Gluc

## 2025-04-14 ENCOUNTER — RESULTS FOLLOW-UP (OUTPATIENT)
Dept: PRIMARY CARE CLINIC | Age: 60
End: 2025-04-14

## 2025-05-13 NOTE — TELEPHONE ENCOUNTER
Ady Foy MD 9/6/2023  Poor prep   Pls reschedule - extended Miralax gatorade vs. Golytely vs. Sutabs. Please contact his mother - she guides his decision making.      Thanks,     Dr. Victor M Pedraza
Scheduling attempt made. Spoke with mother Gail Mota. She requested not to schedule at this time and to call office back when she is ready to reschedule.
4 = No assist / stand by assistance

## 2025-06-09 NOTE — TELEPHONE ENCOUNTER
Refill request from [facility] for Atorvastatin 40 mg, daily in the evening

## 2025-06-10 RX ORDER — ATORVASTATIN CALCIUM 40 MG/1
40 TABLET, FILM COATED ORAL DAILY
Qty: 90 TABLET | Refills: 1 | Status: SHIPPED | OUTPATIENT
Start: 2025-06-10

## 2025-07-07 SDOH — HEALTH STABILITY: PHYSICAL HEALTH: ON AVERAGE, HOW MANY DAYS PER WEEK DO YOU ENGAGE IN MODERATE TO STRENUOUS EXERCISE (LIKE A BRISK WALK)?: 7 DAYS

## 2025-07-07 SDOH — HEALTH STABILITY: PHYSICAL HEALTH: ON AVERAGE, HOW MANY MINUTES DO YOU ENGAGE IN EXERCISE AT THIS LEVEL?: 20 MIN

## 2025-07-07 ASSESSMENT — PATIENT HEALTH QUESTIONNAIRE - PHQ9
SUM OF ALL RESPONSES TO PHQ QUESTIONS 1-9: 0
2. FEELING DOWN, DEPRESSED OR HOPELESS: NOT AT ALL
1. LITTLE INTEREST OR PLEASURE IN DOING THINGS: NOT AT ALL
SUM OF ALL RESPONSES TO PHQ QUESTIONS 1-9: 0

## 2025-07-07 ASSESSMENT — LIFESTYLE VARIABLES
HOW OFTEN DO YOU HAVE A DRINK CONTAINING ALCOHOL: NEVER
HOW MANY STANDARD DRINKS CONTAINING ALCOHOL DO YOU HAVE ON A TYPICAL DAY: 0
HOW OFTEN DO YOU HAVE A DRINK CONTAINING ALCOHOL: 1
HOW MANY STANDARD DRINKS CONTAINING ALCOHOL DO YOU HAVE ON A TYPICAL DAY: PATIENT DOES NOT DRINK
HOW OFTEN DO YOU HAVE SIX OR MORE DRINKS ON ONE OCCASION: 1

## 2025-07-08 ENCOUNTER — OFFICE VISIT (OUTPATIENT)
Dept: PRIMARY CARE CLINIC | Age: 60
End: 2025-07-08

## 2025-07-08 VITALS
SYSTOLIC BLOOD PRESSURE: 122 MMHG | DIASTOLIC BLOOD PRESSURE: 80 MMHG | OXYGEN SATURATION: 95 % | HEART RATE: 81 BPM | WEIGHT: 188.12 LBS | BODY MASS INDEX: 26.34 KG/M2 | HEIGHT: 71 IN

## 2025-07-08 DIAGNOSIS — Z87.891 PERSONAL HISTORY OF TOBACCO USE: ICD-10-CM

## 2025-07-08 DIAGNOSIS — Z00.00 MEDICARE ANNUAL WELLNESS VISIT, SUBSEQUENT: Primary | ICD-10-CM

## 2025-07-08 DIAGNOSIS — S19.9XXD INJURY OF NECK, SUBSEQUENT ENCOUNTER: ICD-10-CM

## 2025-07-08 DIAGNOSIS — M54.12 LEFT CERVICAL RADICULOPATHY: ICD-10-CM

## 2025-07-08 DIAGNOSIS — Z87.891 PERSONAL HISTORY OF NICOTINE DEPENDENCE: ICD-10-CM

## 2025-07-08 DIAGNOSIS — E11.42 TYPE 2 DIABETES MELLITUS WITH DIABETIC POLYNEUROPATHY, WITHOUT LONG-TERM CURRENT USE OF INSULIN (HCC): ICD-10-CM

## 2025-07-08 RX ORDER — SEMAGLUTIDE 1.34 MG/ML
1 INJECTION, SOLUTION SUBCUTANEOUS
COMMUNITY
Start: 2025-06-10

## 2025-07-08 RX ORDER — OMEPRAZOLE 20 MG/1
20 CAPSULE, DELAYED RELEASE ORAL DAILY
Qty: 90 CAPSULE | Refills: 3 | Status: SHIPPED | OUTPATIENT
Start: 2025-07-08

## 2025-07-08 RX ORDER — PREGABALIN 50 MG/1
50 CAPSULE ORAL 2 TIMES DAILY
Qty: 180 CAPSULE | Refills: 0 | Status: SHIPPED | OUTPATIENT
Start: 2025-07-08 | End: 2025-10-06

## 2025-07-08 NOTE — PROGRESS NOTES
Medicare Annual Wellness Visit    Jamshid Boston is here for Medicare AWV (Mini cog-3)    Assessment & Plan   Medicare annual wellness visit, subsequent  Left cervical radiculopathy /  normal CT neck 2022 /   MRI 2022 /  EMG 2023  -     pregabalin (LYRICA) 50 MG capsule; Take 1 capsule by mouth 2 times daily for 90 days. Max Daily Amount: 100 mg, Disp-180 capsule, R-0Normal  Injury of neck, subsequent encounter  -     pregabalin (LYRICA) 50 MG capsule; Take 1 capsule by mouth 2 times daily for 90 days. Max Daily Amount: 100 mg, Disp-180 capsule, R-0Normal  Personal history of tobacco use  -     MT VISIT TO DISCUSS LUNG CA SCREEN W LDCT  -     CT Lung Screen (Initial/Annual/Baseline); Future  Type 2 diabetes mellitus with diabetic polyneuropathy, without long-term current use of insulin ([facility])  Personal history of nicotine dependence    Chronic neck pain, cervical radiculopathy  He is advised to engage in daily neck exercises and stretches for a minimum of 10 to 15 minutes to maintain muscle flexibility and alleviate pain.  Strong symptomatic improvement with Lyrica, continue Lyrica 50 mg twice daily.  Monitor for any side effects.     Diabetes Mellitus.  His diabetes is well-managed with his current medication regimen, which includes 30 units of insulin every night, metformin, Jardiance, and Ozempic 1 mg weekly. He will continue with his current medications.  Continue follow-up with endocrinology in [place]      History of depression stable at this time, continue Prozac and Abilify.  Continue follow-up with psychiatric services    Due for low-dose lung CT, recommend doing CT at [facility] so images can be compared to previous CTs.       Return in about 3 months (around 10/8/2025) for fu diabetes, htn, meds .     Subjective   The following acute and/or chronic problems were also addressed today:  As above    Patient's complete Health Risk Assessment and screening values have been reviewed and are found in

## 2025-07-08 NOTE — PATIENT INSTRUCTIONS
any cancer. This is called a false-positive result. This means you may need more tests to make sure you don't have cancer. These tests can be harmful and cause a lot of worry.  These tests may include more CT scans and invasive testing like a lung biopsy. In a biopsy, the doctor takes a sample of tissue from inside your lung so it can be looked at under a microscope. A biopsy is the only way to tell if you have lung cancer. If the biopsy finds cancer, you and your doctor will have to decide how or whether to treat it.  Some lung cancers found on CT scans are harmless and would not have caused a problem if they had not been found through screening. But because doctors can't tell which ones will turn out to be harmless, most will be treated. This means that you may get treatment--including surgery, radiation, or chemotherapy--that you don't need.  There is a risk of damage to cells or tissue from being exposed to radiation, including the small amounts used in CTs, X-rays, and other medical tests. Over time, exposure to radiation may cause cancer and other health problems. But in most cases, the risk of getting cancer from being exposed to small amounts of radiation is low. It's not a reason to avoid these tests for most people.  What are the benefits of screening?  Your scan may be normal (negative).  For some people who are at higher risk, screening lowers the chance of dying of lung cancer. How much and how long you smoked helps to determine your risk level. Screening can find some cancers early, when treatment may be more likely to work.  What happens after screening?  The results of your CT scan will be sent to your doctor. Someone from your care team will explain the results of your scan and answer any questions you may have. If you need any follow-up, he or she will help you understand what to do next.  After a lung cancer screening, you can go back to your usual activities right away.  A lung cancer screening

## 2025-07-31 DIAGNOSIS — Z87.891 PERSONAL HISTORY OF TOBACCO USE: ICD-10-CM

## 2025-08-26 DIAGNOSIS — R35.0 FREQUENCY OF URINATION: Primary | ICD-10-CM

## 2025-08-26 RX ORDER — TAMSULOSIN HYDROCHLORIDE 0.4 MG/1
0.4 CAPSULE ORAL DAILY
Qty: 90 CAPSULE | Refills: 1 | Status: SHIPPED | OUTPATIENT
Start: 2025-08-26

## (undated) DEVICE — PLUG CATH F UNIV ENDCAP FOR OCCL DRNGE LUMN DISP

## (undated) DEVICE — CANNULA ORAL NSL AD CO2 N INTUB O2 DEL DISP TRU LNK

## (undated) DEVICE — SOLUTION IRRIG 1000ML 0.9% SOD CHL USP POUR PLAS BTL

## (undated) DEVICE — SOLUTION IV 1000ML 0.9% SOD CHL FOR IRRIG PLAS CONT

## (undated) DEVICE — Device

## (undated) DEVICE — FORCEP SPEC RETRV BX AD 2 MMX155 CM 5 MM GI OVL CUP W/ NDL

## (undated) DEVICE — SOLUTION IV IRRIG WATER 1000ML POUR BRL 2F7114

## (undated) DEVICE — SOLUTION IRRIG 3000ML 0.9% SOD CHL USP UROMATIC PLAS CONT

## (undated) DEVICE — DRIP REDUCTION MANIFOLD

## (undated) DEVICE — SET ADMIN 25ML L117IN PMP MOD CK VLV RLER CLMP 2 SMRTSITE

## (undated) DEVICE — LASER SURG W22XH58IN D36IN 475LB SLD STATE FREQ DOUBLED

## (undated) DEVICE — CATHETER URETH 22FR 30CC BLLN F 3 W SPEC M RND TIP TWO

## (undated) DEVICE — DALE FOLEY CATHETER HOLDER, LEGBAND, FITS UP TO 30": Brand: DALE FOLEY CATHETER HOLDER

## (undated) DEVICE — BAG DRNGE 2000ML ROUNDED TEARDROP W/ ANTIREFLX CHMBR DISP

## (undated) DEVICE — TUBING SUCT NON-STRL 9/32X100 W/CNNT